# Patient Record
Sex: MALE | Race: WHITE | NOT HISPANIC OR LATINO | Employment: OTHER | URBAN - METROPOLITAN AREA
[De-identification: names, ages, dates, MRNs, and addresses within clinical notes are randomized per-mention and may not be internally consistent; named-entity substitution may affect disease eponyms.]

---

## 2017-01-04 ENCOUNTER — COMMUNICATION - HEALTHEAST (OUTPATIENT)
Dept: FAMILY MEDICINE | Facility: CLINIC | Age: 67
End: 2017-01-04

## 2017-01-04 ENCOUNTER — OFFICE VISIT - HEALTHEAST (OUTPATIENT)
Dept: FAMILY MEDICINE | Facility: CLINIC | Age: 67
End: 2017-01-04

## 2017-01-04 DIAGNOSIS — M25.511 SHOULDER PAIN, RIGHT: ICD-10-CM

## 2017-01-04 ASSESSMENT — MIFFLIN-ST. JEOR: SCORE: 1681.02

## 2017-01-12 ENCOUNTER — HOSPITAL ENCOUNTER (OUTPATIENT)
Dept: MRI IMAGING | Facility: CLINIC | Age: 67
Discharge: HOME | End: 2017-01-12
Attending: EMERGENCY MEDICINE

## 2017-01-12 DIAGNOSIS — M25.519 SHOULDER PAIN: ICD-10-CM

## 2017-01-12 DIAGNOSIS — M54.2 NECK PAIN: ICD-10-CM

## 2017-01-16 ENCOUNTER — HOSPITAL ENCOUNTER (OUTPATIENT)
Dept: PHYSICAL MEDICINE AND REHAB | Facility: CLINIC | Age: 67
Discharge: HOME | End: 2017-01-16
Attending: PHYSICAL MEDICINE & REHABILITATION

## 2017-01-16 DIAGNOSIS — M48.02 NEURAL FORAMINAL STENOSIS OF CERVICAL SPINE: ICD-10-CM

## 2017-01-16 DIAGNOSIS — M79.18 MYOFASCIAL MUSCLE PAIN: ICD-10-CM

## 2017-01-16 DIAGNOSIS — M54.2 CERVICALGIA: ICD-10-CM

## 2017-01-16 DIAGNOSIS — M54.12 CERVICAL RADICULAR PAIN: ICD-10-CM

## 2017-01-16 ASSESSMENT — MIFFLIN-ST. JEOR: SCORE: 1649.27

## 2017-01-17 ENCOUNTER — OFFICE VISIT - HEALTHEAST (OUTPATIENT)
Dept: PHYSICAL THERAPY | Facility: CLINIC | Age: 67
End: 2017-01-17

## 2017-01-17 DIAGNOSIS — M54.6 THORACIC SPINE PAIN: ICD-10-CM

## 2017-01-17 DIAGNOSIS — M79.18 MYOFASCIAL PAIN: ICD-10-CM

## 2017-01-17 DIAGNOSIS — M54.12 CERVICAL RADICULITIS: ICD-10-CM

## 2017-01-20 ENCOUNTER — OFFICE VISIT - HEALTHEAST (OUTPATIENT)
Dept: PHYSICAL THERAPY | Facility: CLINIC | Age: 67
End: 2017-01-20

## 2017-01-20 ENCOUNTER — HOSPITAL ENCOUNTER (OUTPATIENT)
Dept: PHYSICAL MEDICINE AND REHAB | Facility: CLINIC | Age: 67
Discharge: HOME | End: 2017-01-20
Attending: PHYSICAL MEDICINE & REHABILITATION

## 2017-01-20 DIAGNOSIS — M54.12 CERVICAL RADICULAR PAIN: ICD-10-CM

## 2017-01-20 DIAGNOSIS — M54.2 CERVICALGIA: ICD-10-CM

## 2017-01-20 DIAGNOSIS — M54.12 CERVICAL RADICULITIS: ICD-10-CM

## 2017-01-20 DIAGNOSIS — M48.02 NEURAL FORAMINAL STENOSIS OF CERVICAL SPINE: ICD-10-CM

## 2017-01-20 DIAGNOSIS — M54.6 THORACIC SPINE PAIN: ICD-10-CM

## 2017-01-20 DIAGNOSIS — M79.18 MYOFASCIAL PAIN: ICD-10-CM

## 2017-01-24 ENCOUNTER — OFFICE VISIT - HEALTHEAST (OUTPATIENT)
Dept: PHYSICAL THERAPY | Facility: CLINIC | Age: 67
End: 2017-01-24

## 2017-01-24 DIAGNOSIS — M54.6 THORACIC SPINE PAIN: ICD-10-CM

## 2017-01-24 DIAGNOSIS — M79.18 MYOFASCIAL PAIN: ICD-10-CM

## 2017-01-24 DIAGNOSIS — M54.12 CERVICAL RADICULITIS: ICD-10-CM

## 2017-01-27 ENCOUNTER — COMMUNICATION - HEALTHEAST (OUTPATIENT)
Dept: FAMILY MEDICINE | Facility: CLINIC | Age: 67
End: 2017-01-27

## 2017-01-27 DIAGNOSIS — R05.9 COUGH: ICD-10-CM

## 2017-01-30 ENCOUNTER — COMMUNICATION - HEALTHEAST (OUTPATIENT)
Dept: FAMILY MEDICINE | Facility: CLINIC | Age: 67
End: 2017-01-30

## 2017-04-05 ENCOUNTER — COMMUNICATION - HEALTHEAST (OUTPATIENT)
Dept: FAMILY MEDICINE | Facility: CLINIC | Age: 67
End: 2017-04-05

## 2017-04-05 DIAGNOSIS — E78.5 HYPERLIPIDEMIA: ICD-10-CM

## 2017-04-10 ENCOUNTER — COMMUNICATION - HEALTHEAST (OUTPATIENT)
Dept: FAMILY MEDICINE | Facility: CLINIC | Age: 67
End: 2017-04-10

## 2017-04-12 ENCOUNTER — AMBULATORY - HEALTHEAST (OUTPATIENT)
Dept: LAB | Facility: CLINIC | Age: 67
End: 2017-04-12

## 2017-04-12 DIAGNOSIS — E78.5 HYPERLIPIDEMIA, UNSPECIFIED HYPERLIPIDEMIA TYPE: ICD-10-CM

## 2017-04-12 LAB
CHOLEST SERPL-MCNC: 210 MG/DL
FASTING STATUS PATIENT QL REPORTED: YES
HDLC SERPL-MCNC: 34 MG/DL
LDLC SERPL CALC-MCNC: 105 MG/DL
TRIGL SERPL-MCNC: 353 MG/DL

## 2017-04-17 ENCOUNTER — COMMUNICATION - HEALTHEAST (OUTPATIENT)
Dept: PHYSICAL MEDICINE AND REHAB | Facility: CLINIC | Age: 67
End: 2017-04-17

## 2017-04-17 DIAGNOSIS — M54.12 CERVICAL RADICULAR PAIN: ICD-10-CM

## 2017-04-17 DIAGNOSIS — M54.2 CERVICALGIA: ICD-10-CM

## 2017-04-20 ENCOUNTER — COMMUNICATION - HEALTHEAST (OUTPATIENT)
Dept: PHYSICAL MEDICINE AND REHAB | Facility: CLINIC | Age: 67
End: 2017-04-20

## 2017-04-28 ENCOUNTER — HOSPITAL ENCOUNTER (OUTPATIENT)
Dept: PHYSICAL MEDICINE AND REHAB | Facility: CLINIC | Age: 67
Discharge: HOME | End: 2017-04-28
Attending: PHYSICAL MEDICINE & REHABILITATION

## 2017-04-28 DIAGNOSIS — M79.18 MYOFASCIAL MUSCLE PAIN: ICD-10-CM

## 2017-04-28 DIAGNOSIS — M48.02 NEURAL FORAMINAL STENOSIS OF CERVICAL SPINE: ICD-10-CM

## 2017-04-28 DIAGNOSIS — M54.12 CERVICAL RADICULAR PAIN: ICD-10-CM

## 2017-04-28 DIAGNOSIS — M54.2 CERVICALGIA: ICD-10-CM

## 2017-07-19 ENCOUNTER — COMMUNICATION - HEALTHEAST (OUTPATIENT)
Dept: FAMILY MEDICINE | Facility: CLINIC | Age: 67
End: 2017-07-19

## 2017-07-19 DIAGNOSIS — E78.5 HYPERLIPIDEMIA: ICD-10-CM

## 2017-07-20 ENCOUNTER — COMMUNICATION - HEALTHEAST (OUTPATIENT)
Dept: FAMILY MEDICINE | Facility: CLINIC | Age: 67
End: 2017-07-20

## 2017-07-20 DIAGNOSIS — E78.5 HYPERLIPIDEMIA: ICD-10-CM

## 2017-08-15 ENCOUNTER — COMMUNICATION - HEALTHEAST (OUTPATIENT)
Dept: PHYSICAL MEDICINE AND REHAB | Facility: CLINIC | Age: 67
End: 2017-08-15

## 2017-08-15 DIAGNOSIS — M54.2 CERVICALGIA: ICD-10-CM

## 2017-08-15 DIAGNOSIS — M54.12 CERVICAL RADICULAR PAIN: ICD-10-CM

## 2017-09-10 ENCOUNTER — COMMUNICATION - HEALTHEAST (OUTPATIENT)
Dept: PHYSICAL MEDICINE AND REHAB | Facility: CLINIC | Age: 67
End: 2017-09-10

## 2017-09-10 DIAGNOSIS — M54.2 CERVICALGIA: ICD-10-CM

## 2017-09-10 DIAGNOSIS — M54.12 CERVICAL RADICULAR PAIN: ICD-10-CM

## 2017-09-25 ENCOUNTER — AMBULATORY - HEALTHEAST (OUTPATIENT)
Dept: NURSING | Facility: CLINIC | Age: 67
End: 2017-09-25

## 2017-09-25 DIAGNOSIS — Z00.00 HEALTH CARE MAINTENANCE: ICD-10-CM

## 2017-10-28 ENCOUNTER — COMMUNICATION - HEALTHEAST (OUTPATIENT)
Dept: FAMILY MEDICINE | Facility: CLINIC | Age: 67
End: 2017-10-28

## 2017-10-28 DIAGNOSIS — E78.5 HYPERLIPIDEMIA: ICD-10-CM

## 2017-10-31 ENCOUNTER — RECORDS - HEALTHEAST (OUTPATIENT)
Dept: ADMINISTRATIVE | Facility: OTHER | Age: 67
End: 2017-10-31

## 2018-01-13 ENCOUNTER — COMMUNICATION - HEALTHEAST (OUTPATIENT)
Dept: FAMILY MEDICINE | Facility: CLINIC | Age: 68
End: 2018-01-13

## 2018-01-13 ENCOUNTER — COMMUNICATION - HEALTHEAST (OUTPATIENT)
Dept: PHYSICAL MEDICINE AND REHAB | Facility: CLINIC | Age: 68
End: 2018-01-13

## 2018-01-13 DIAGNOSIS — M54.12 CERVICAL RADICULAR PAIN: ICD-10-CM

## 2018-01-13 DIAGNOSIS — E78.5 HYPERLIPIDEMIA: ICD-10-CM

## 2018-01-13 DIAGNOSIS — M54.2 CERVICALGIA: ICD-10-CM

## 2018-01-29 ENCOUNTER — COMMUNICATION - HEALTHEAST (OUTPATIENT)
Dept: PHYSICAL MEDICINE AND REHAB | Facility: CLINIC | Age: 68
End: 2018-01-29

## 2018-02-05 ENCOUNTER — COMMUNICATION - HEALTHEAST (OUTPATIENT)
Dept: PHYSICAL MEDICINE AND REHAB | Facility: CLINIC | Age: 68
End: 2018-02-05

## 2018-04-11 ENCOUNTER — COMMUNICATION - HEALTHEAST (OUTPATIENT)
Dept: FAMILY MEDICINE | Facility: CLINIC | Age: 68
End: 2018-04-11

## 2018-04-11 DIAGNOSIS — E78.5 HYPERLIPIDEMIA: ICD-10-CM

## 2018-04-18 ENCOUNTER — HOSPITAL ENCOUNTER (OUTPATIENT)
Dept: PHYSICAL MEDICINE AND REHAB | Facility: CLINIC | Age: 68
Discharge: HOME | End: 2018-04-18
Attending: PHYSICAL MEDICINE & REHABILITATION

## 2018-04-18 ENCOUNTER — HOSPITAL ENCOUNTER (OUTPATIENT)
Dept: RADIOLOGY | Facility: HOSPITAL | Age: 68
Discharge: HOME | End: 2018-04-18
Attending: PHYSICAL MEDICINE & REHABILITATION

## 2018-04-18 DIAGNOSIS — M48.02 NEURAL FORAMINAL STENOSIS OF CERVICAL SPINE: ICD-10-CM

## 2018-04-18 DIAGNOSIS — R20.2 HAND PARESTHESIA: ICD-10-CM

## 2018-04-18 DIAGNOSIS — M54.12 CERVICAL RADICULAR PAIN: ICD-10-CM

## 2018-04-18 DIAGNOSIS — M79.18 MYOFASCIAL MUSCLE PAIN: ICD-10-CM

## 2018-04-18 DIAGNOSIS — M54.2 CERVICALGIA: ICD-10-CM

## 2018-04-18 ASSESSMENT — MIFFLIN-ST. JEOR
SCORE: 1671.95
SCORE: 1671.95

## 2018-04-20 ENCOUNTER — COMMUNICATION - HEALTHEAST (OUTPATIENT)
Dept: PHYSICAL MEDICINE AND REHAB | Facility: CLINIC | Age: 68
End: 2018-04-20

## 2018-04-20 DIAGNOSIS — M54.16 LUMBAR RADICULAR PAIN: ICD-10-CM

## 2018-04-24 ENCOUNTER — RECORDS - HEALTHEAST (OUTPATIENT)
Dept: ADMINISTRATIVE | Facility: OTHER | Age: 68
End: 2018-04-24

## 2018-04-26 ENCOUNTER — RECORDS - HEALTHEAST (OUTPATIENT)
Dept: RADIOLOGY | Facility: CLINIC | Age: 68
End: 2018-04-26

## 2018-04-26 ENCOUNTER — OFFICE VISIT - HEALTHEAST (OUTPATIENT)
Dept: NEUROSURGERY | Facility: CLINIC | Age: 68
End: 2018-04-26

## 2018-04-26 DIAGNOSIS — T81.40XS POSTOPERATIVE INFECTION, SEQUELA: ICD-10-CM

## 2018-04-26 DIAGNOSIS — G54.2 CERVICAL NERVE ROOT COMPRESSION: ICD-10-CM

## 2018-04-26 ASSESSMENT — MIFFLIN-ST. JEOR: SCORE: 1644.73

## 2018-04-27 ENCOUNTER — COMMUNICATION - HEALTHEAST (OUTPATIENT)
Dept: FAMILY MEDICINE | Facility: CLINIC | Age: 68
End: 2018-04-27

## 2018-04-30 ENCOUNTER — RECORDS - HEALTHEAST (OUTPATIENT)
Dept: ADMINISTRATIVE | Facility: OTHER | Age: 68
End: 2018-04-30

## 2018-04-30 ENCOUNTER — COMMUNICATION - HEALTHEAST (OUTPATIENT)
Dept: NEUROSURGERY | Facility: CLINIC | Age: 68
End: 2018-04-30

## 2018-05-02 ENCOUNTER — OFFICE VISIT - HEALTHEAST (OUTPATIENT)
Dept: FAMILY MEDICINE | Facility: CLINIC | Age: 68
End: 2018-05-02

## 2018-05-02 ENCOUNTER — AMBULATORY - HEALTHEAST (OUTPATIENT)
Dept: FAMILY MEDICINE | Facility: CLINIC | Age: 68
End: 2018-05-02

## 2018-05-02 DIAGNOSIS — M54.12 CERVICAL RADICULAR PAIN: ICD-10-CM

## 2018-05-02 DIAGNOSIS — Z01.818 PREOP EXAMINATION: ICD-10-CM

## 2018-05-02 LAB
BASOPHILS # BLD AUTO: 0 THOU/UL (ref 0–0.2)
BASOPHILS NFR BLD AUTO: 1 % (ref 0–2)
EOSINOPHIL # BLD AUTO: 0.2 THOU/UL (ref 0–0.4)
EOSINOPHIL NFR BLD AUTO: 3 % (ref 0–6)
ERYTHROCYTE [DISTWIDTH] IN BLOOD BY AUTOMATED COUNT: 12.4 % (ref 11–14.5)
HCT VFR BLD AUTO: 49.5 % (ref 40–54)
HGB BLD-MCNC: 17.1 G/DL (ref 14–18)
INR PPP: 1.06 (ref 0.9–1.1)
LYMPHOCYTES # BLD AUTO: 1.8 THOU/UL (ref 0.8–4.4)
LYMPHOCYTES NFR BLD AUTO: 23 % (ref 20–40)
MCH RBC QN AUTO: 32.1 PG (ref 27–34)
MCHC RBC AUTO-ENTMCNC: 34.4 G/DL (ref 32–36)
MCV RBC AUTO: 93 FL (ref 80–100)
MONOCYTES # BLD AUTO: 0.7 THOU/UL (ref 0–0.9)
MONOCYTES NFR BLD AUTO: 8 % (ref 2–10)
NEUTROPHILS # BLD AUTO: 5.1 THOU/UL (ref 2–7.7)
NEUTROPHILS NFR BLD AUTO: 65 % (ref 50–70)
PLATELET # BLD AUTO: 148 THOU/UL (ref 140–440)
PMV BLD AUTO: 8.2 FL (ref 7–10)
POTASSIUM BLD-SCNC: 4 MMOL/L (ref 3.5–5)
RBC # BLD AUTO: 5.31 MILL/UL (ref 4.4–6.2)
WBC: 7.8 THOU/UL (ref 4–11)

## 2018-05-02 ASSESSMENT — MIFFLIN-ST. JEOR: SCORE: 1688.4

## 2018-05-03 ENCOUNTER — COMMUNICATION - HEALTHEAST (OUTPATIENT)
Dept: FAMILY MEDICINE | Facility: CLINIC | Age: 68
End: 2018-05-03

## 2018-05-03 ENCOUNTER — RECORDS - HEALTHEAST (OUTPATIENT)
Dept: ADMINISTRATIVE | Facility: OTHER | Age: 68
End: 2018-05-03

## 2018-05-03 LAB
ATRIAL RATE - MUSE: 80 BPM
DIASTOLIC BLOOD PRESSURE - MUSE: NORMAL MMHG
INTERPRETATION ECG - MUSE: NORMAL
P AXIS - MUSE: 38 DEGREES
PR INTERVAL - MUSE: 192 MS
QRS DURATION - MUSE: 80 MS
QT - MUSE: 366 MS
QTC - MUSE: 422 MS
R AXIS - MUSE: 4 DEGREES
SYSTOLIC BLOOD PRESSURE - MUSE: NORMAL MMHG
T AXIS - MUSE: 9 DEGREES
VENTRICULAR RATE- MUSE: 80 BPM

## 2018-05-07 ENCOUNTER — OFFICE VISIT - HEALTHEAST (OUTPATIENT)
Dept: FAMILY MEDICINE | Facility: CLINIC | Age: 68
End: 2018-05-07

## 2018-05-07 DIAGNOSIS — J40 BRONCHITIS: ICD-10-CM

## 2018-05-07 DIAGNOSIS — R06.2 WHEEZING: ICD-10-CM

## 2018-05-07 ASSESSMENT — MIFFLIN-ST. JEOR: SCORE: 1665.26

## 2018-05-11 ENCOUNTER — OFFICE VISIT - HEALTHEAST (OUTPATIENT)
Dept: FAMILY MEDICINE | Facility: CLINIC | Age: 68
End: 2018-05-11

## 2018-05-11 DIAGNOSIS — J40 BRONCHITIS: ICD-10-CM

## 2018-05-11 DIAGNOSIS — Z00.00 HEALTH CARE MAINTENANCE: ICD-10-CM

## 2018-05-11 ASSESSMENT — MIFFLIN-ST. JEOR: SCORE: 1667.98

## 2018-05-18 ENCOUNTER — COMMUNICATION - HEALTHEAST (OUTPATIENT)
Dept: NEUROSURGERY | Facility: CLINIC | Age: 68
End: 2018-05-18

## 2018-05-18 ASSESSMENT — MIFFLIN-ST. JEOR: SCORE: 1633.96

## 2018-05-21 ENCOUNTER — RECORDS - HEALTHEAST (OUTPATIENT)
Dept: ADMINISTRATIVE | Facility: OTHER | Age: 68
End: 2018-05-21

## 2018-05-21 ENCOUNTER — ANESTHESIA - HEALTHEAST (OUTPATIENT)
Dept: SURGERY | Facility: HOSPITAL | Age: 68
End: 2018-05-21

## 2018-05-22 ENCOUNTER — SURGERY - HEALTHEAST (OUTPATIENT)
Dept: SURGERY | Facility: HOSPITAL | Age: 68
End: 2018-05-22

## 2018-05-23 ENCOUNTER — COMMUNICATION - HEALTHEAST (OUTPATIENT)
Dept: NEUROSURGERY | Facility: CLINIC | Age: 68
End: 2018-05-23

## 2018-05-31 ENCOUNTER — RECORDS - HEALTHEAST (OUTPATIENT)
Dept: ADMINISTRATIVE | Facility: OTHER | Age: 68
End: 2018-05-31

## 2018-06-06 ENCOUNTER — AMBULATORY - HEALTHEAST (OUTPATIENT)
Dept: NEUROSURGERY | Facility: CLINIC | Age: 68
End: 2018-06-06

## 2018-06-06 DIAGNOSIS — Z48.02 REMOVAL OF STAPLES: ICD-10-CM

## 2018-06-08 ENCOUNTER — COMMUNICATION - HEALTHEAST (OUTPATIENT)
Dept: NEUROSURGERY | Facility: CLINIC | Age: 68
End: 2018-06-08

## 2018-06-20 ENCOUNTER — OFFICE VISIT - HEALTHEAST (OUTPATIENT)
Dept: NEUROSURGERY | Facility: CLINIC | Age: 68
End: 2018-06-20

## 2018-06-20 DIAGNOSIS — G54.2 CERVICAL NERVE ROOT COMPRESSION: ICD-10-CM

## 2018-06-20 ASSESSMENT — MIFFLIN-ST. JEOR: SCORE: 1656.64

## 2018-07-03 ENCOUNTER — COMMUNICATION - HEALTHEAST (OUTPATIENT)
Dept: PHYSICAL THERAPY | Facility: REHABILITATION | Age: 68
End: 2018-07-03

## 2018-07-03 ENCOUNTER — COMMUNICATION - HEALTHEAST (OUTPATIENT)
Dept: NEUROSURGERY | Facility: CLINIC | Age: 68
End: 2018-07-03

## 2018-07-03 ENCOUNTER — OFFICE VISIT - HEALTHEAST (OUTPATIENT)
Dept: PHYSICAL THERAPY | Facility: REHABILITATION | Age: 68
End: 2018-07-03

## 2018-07-03 DIAGNOSIS — M54.12 CERVICAL RADICULITIS: ICD-10-CM

## 2018-07-03 DIAGNOSIS — M79.18 MYOFASCIAL PAIN: ICD-10-CM

## 2018-07-03 DIAGNOSIS — M62.81 GENERALIZED MUSCLE WEAKNESS: ICD-10-CM

## 2018-07-03 DIAGNOSIS — G54.2 CERVICAL NERVE ROOT COMPRESSION: ICD-10-CM

## 2018-07-16 ENCOUNTER — COMMUNICATION - HEALTHEAST (OUTPATIENT)
Dept: FAMILY MEDICINE | Facility: CLINIC | Age: 68
End: 2018-07-16

## 2018-07-16 DIAGNOSIS — E78.5 HYPERLIPIDEMIA: ICD-10-CM

## 2018-07-17 ENCOUNTER — OFFICE VISIT - HEALTHEAST (OUTPATIENT)
Dept: PHYSICAL THERAPY | Facility: REHABILITATION | Age: 68
End: 2018-07-17

## 2018-07-17 DIAGNOSIS — G54.2 CERVICAL NERVE ROOT COMPRESSION: ICD-10-CM

## 2018-07-17 DIAGNOSIS — M54.6 THORACIC SPINE PAIN: ICD-10-CM

## 2018-07-17 DIAGNOSIS — M79.18 MYOFASCIAL PAIN: ICD-10-CM

## 2018-07-17 DIAGNOSIS — M54.12 CERVICAL RADICULITIS: ICD-10-CM

## 2018-07-17 DIAGNOSIS — M62.81 GENERALIZED MUSCLE WEAKNESS: ICD-10-CM

## 2018-07-18 ENCOUNTER — AMBULATORY - HEALTHEAST (OUTPATIENT)
Dept: NEUROSURGERY | Facility: CLINIC | Age: 68
End: 2018-07-18

## 2018-07-18 DIAGNOSIS — G54.2 CERVICAL NERVE ROOT COMPRESSION: ICD-10-CM

## 2018-07-24 ENCOUNTER — OFFICE VISIT - HEALTHEAST (OUTPATIENT)
Dept: PHYSICAL THERAPY | Facility: REHABILITATION | Age: 68
End: 2018-07-24

## 2018-07-24 DIAGNOSIS — M79.18 MYOFASCIAL PAIN: ICD-10-CM

## 2018-07-24 DIAGNOSIS — G54.2 CERVICAL NERVE ROOT COMPRESSION: ICD-10-CM

## 2018-07-24 DIAGNOSIS — M54.6 THORACIC SPINE PAIN: ICD-10-CM

## 2018-07-24 DIAGNOSIS — M54.12 CERVICAL RADICULITIS: ICD-10-CM

## 2018-07-24 DIAGNOSIS — M62.81 GENERALIZED MUSCLE WEAKNESS: ICD-10-CM

## 2018-07-31 ENCOUNTER — OFFICE VISIT - HEALTHEAST (OUTPATIENT)
Dept: PHYSICAL THERAPY | Facility: REHABILITATION | Age: 68
End: 2018-07-31

## 2018-07-31 DIAGNOSIS — G54.2 CERVICAL NERVE ROOT COMPRESSION: ICD-10-CM

## 2018-07-31 DIAGNOSIS — M79.18 MYOFASCIAL PAIN: ICD-10-CM

## 2018-07-31 DIAGNOSIS — M62.81 GENERALIZED MUSCLE WEAKNESS: ICD-10-CM

## 2018-07-31 DIAGNOSIS — M54.6 THORACIC SPINE PAIN: ICD-10-CM

## 2018-07-31 DIAGNOSIS — M54.12 CERVICAL RADICULITIS: ICD-10-CM

## 2018-08-06 ENCOUNTER — RECORDS - HEALTHEAST (OUTPATIENT)
Dept: GENERAL RADIOLOGY | Facility: CLINIC | Age: 68
End: 2018-08-06

## 2018-08-06 ENCOUNTER — OFFICE VISIT - HEALTHEAST (OUTPATIENT)
Dept: FAMILY MEDICINE | Facility: CLINIC | Age: 68
End: 2018-08-06

## 2018-08-06 DIAGNOSIS — M25.512 SHOULDER PAIN, LEFT: ICD-10-CM

## 2018-08-06 DIAGNOSIS — S40.029A: ICD-10-CM

## 2018-08-06 DIAGNOSIS — M25.512 PAIN IN LEFT SHOULDER: ICD-10-CM

## 2018-08-06 DIAGNOSIS — S40.019A: ICD-10-CM

## 2018-08-21 ENCOUNTER — OFFICE VISIT - HEALTHEAST (OUTPATIENT)
Dept: PHYSICAL THERAPY | Facility: REHABILITATION | Age: 68
End: 2018-08-21

## 2018-08-21 DIAGNOSIS — M62.81 GENERALIZED MUSCLE WEAKNESS: ICD-10-CM

## 2018-08-21 DIAGNOSIS — M54.6 THORACIC SPINE PAIN: ICD-10-CM

## 2018-08-21 DIAGNOSIS — M54.12 CERVICAL RADICULITIS: ICD-10-CM

## 2018-08-21 DIAGNOSIS — G54.2 CERVICAL NERVE ROOT COMPRESSION: ICD-10-CM

## 2018-08-21 DIAGNOSIS — M79.18 MYOFASCIAL PAIN: ICD-10-CM

## 2018-08-28 ENCOUNTER — OFFICE VISIT - HEALTHEAST (OUTPATIENT)
Dept: PHYSICAL THERAPY | Facility: REHABILITATION | Age: 68
End: 2018-08-28

## 2018-08-28 DIAGNOSIS — M79.18 MYOFASCIAL PAIN: ICD-10-CM

## 2018-08-28 DIAGNOSIS — G54.2 CERVICAL NERVE ROOT COMPRESSION: ICD-10-CM

## 2018-08-28 DIAGNOSIS — M62.81 GENERALIZED MUSCLE WEAKNESS: ICD-10-CM

## 2018-08-28 DIAGNOSIS — M54.12 CERVICAL RADICULITIS: ICD-10-CM

## 2018-08-28 DIAGNOSIS — M54.6 THORACIC SPINE PAIN: ICD-10-CM

## 2018-09-04 ENCOUNTER — OFFICE VISIT - HEALTHEAST (OUTPATIENT)
Dept: PHYSICAL THERAPY | Facility: REHABILITATION | Age: 68
End: 2018-09-04

## 2018-09-04 DIAGNOSIS — M79.18 MYOFASCIAL PAIN: ICD-10-CM

## 2018-09-04 DIAGNOSIS — M54.6 THORACIC SPINE PAIN: ICD-10-CM

## 2018-09-04 DIAGNOSIS — M54.12 CERVICAL RADICULITIS: ICD-10-CM

## 2018-09-04 DIAGNOSIS — G54.2 CERVICAL NERVE ROOT COMPRESSION: ICD-10-CM

## 2018-09-04 DIAGNOSIS — M62.81 GENERALIZED MUSCLE WEAKNESS: ICD-10-CM

## 2018-09-11 ENCOUNTER — OFFICE VISIT - HEALTHEAST (OUTPATIENT)
Dept: PHYSICAL THERAPY | Facility: REHABILITATION | Age: 68
End: 2018-09-11

## 2018-09-11 DIAGNOSIS — M79.18 MYOFASCIAL PAIN: ICD-10-CM

## 2018-09-11 DIAGNOSIS — G54.2 CERVICAL NERVE ROOT COMPRESSION: ICD-10-CM

## 2018-09-11 DIAGNOSIS — M62.81 GENERALIZED MUSCLE WEAKNESS: ICD-10-CM

## 2018-09-11 DIAGNOSIS — M54.12 CERVICAL RADICULITIS: ICD-10-CM

## 2018-09-11 DIAGNOSIS — M54.6 THORACIC SPINE PAIN: ICD-10-CM

## 2018-09-18 ENCOUNTER — OFFICE VISIT - HEALTHEAST (OUTPATIENT)
Dept: PHYSICAL THERAPY | Facility: REHABILITATION | Age: 68
End: 2018-09-18

## 2018-09-18 DIAGNOSIS — M62.81 GENERALIZED MUSCLE WEAKNESS: ICD-10-CM

## 2018-09-18 DIAGNOSIS — M54.6 THORACIC SPINE PAIN: ICD-10-CM

## 2018-09-18 DIAGNOSIS — M79.18 MYOFASCIAL PAIN: ICD-10-CM

## 2018-09-18 DIAGNOSIS — G54.2 CERVICAL NERVE ROOT COMPRESSION: ICD-10-CM

## 2018-09-18 DIAGNOSIS — M54.12 CERVICAL RADICULITIS: ICD-10-CM

## 2018-09-25 ENCOUNTER — OFFICE VISIT - HEALTHEAST (OUTPATIENT)
Dept: PHYSICAL THERAPY | Facility: REHABILITATION | Age: 68
End: 2018-09-25

## 2018-09-25 DIAGNOSIS — M62.81 GENERALIZED MUSCLE WEAKNESS: ICD-10-CM

## 2018-09-25 DIAGNOSIS — M79.18 MYOFASCIAL PAIN: ICD-10-CM

## 2018-09-25 DIAGNOSIS — G54.2 CERVICAL NERVE ROOT COMPRESSION: ICD-10-CM

## 2018-09-25 DIAGNOSIS — M54.6 THORACIC SPINE PAIN: ICD-10-CM

## 2018-09-25 DIAGNOSIS — M54.12 CERVICAL RADICULITIS: ICD-10-CM

## 2018-10-14 ENCOUNTER — COMMUNICATION - HEALTHEAST (OUTPATIENT)
Dept: FAMILY MEDICINE | Facility: CLINIC | Age: 68
End: 2018-10-14

## 2018-10-14 DIAGNOSIS — E78.5 HYPERLIPIDEMIA: ICD-10-CM

## 2018-10-16 ENCOUNTER — OFFICE VISIT - HEALTHEAST (OUTPATIENT)
Dept: PHYSICAL THERAPY | Facility: REHABILITATION | Age: 68
End: 2018-10-16

## 2018-10-16 DIAGNOSIS — M54.6 THORACIC SPINE PAIN: ICD-10-CM

## 2018-10-16 DIAGNOSIS — M79.18 MYOFASCIAL PAIN: ICD-10-CM

## 2018-10-16 DIAGNOSIS — M62.81 GENERALIZED MUSCLE WEAKNESS: ICD-10-CM

## 2018-10-16 DIAGNOSIS — G54.2 CERVICAL NERVE ROOT COMPRESSION: ICD-10-CM

## 2018-10-16 DIAGNOSIS — M54.12 CERVICAL RADICULITIS: ICD-10-CM

## 2018-10-22 ENCOUNTER — HOSPITAL ENCOUNTER (OUTPATIENT)
Dept: PHYSICAL MEDICINE AND REHAB | Facility: CLINIC | Age: 68
Discharge: HOME | End: 2018-10-22
Attending: PHYSICAL MEDICINE & REHABILITATION

## 2018-10-22 DIAGNOSIS — M79.18 MYOFASCIAL MUSCLE PAIN: ICD-10-CM

## 2018-10-22 DIAGNOSIS — M54.12 CERVICAL RADICULAR PAIN: ICD-10-CM

## 2018-10-22 DIAGNOSIS — M99.81 OTHER BIOMECHANICAL LESIONS OF CERVICAL REGION: ICD-10-CM

## 2018-10-22 DIAGNOSIS — M48.02 NEURAL FORAMINAL STENOSIS OF CERVICAL SPINE: ICD-10-CM

## 2018-10-22 RX ORDER — IBUPROFEN 200 MG
600 TABLET ORAL 3 TIMES DAILY
Status: SHIPPED | COMMUNITY
Start: 2018-10-22 | End: 2022-12-09

## 2018-10-22 ASSESSMENT — MIFFLIN-ST. JEOR: SCORE: 1679.32

## 2018-11-01 ENCOUNTER — COMMUNICATION - HEALTHEAST (OUTPATIENT)
Dept: PHYSICAL MEDICINE AND REHAB | Facility: CLINIC | Age: 68
End: 2018-11-01

## 2018-11-01 ENCOUNTER — AMBULATORY - HEALTHEAST (OUTPATIENT)
Dept: PHYSICAL MEDICINE AND REHAB | Facility: CLINIC | Age: 68
End: 2018-11-01

## 2018-11-01 DIAGNOSIS — M54.12 CERVICAL RADICULAR PAIN: ICD-10-CM

## 2018-11-06 ENCOUNTER — HOSPITAL ENCOUNTER (OUTPATIENT)
Dept: PHYSICAL MEDICINE AND REHAB | Facility: CLINIC | Age: 68
Discharge: HOME | End: 2018-11-06
Attending: PHYSICAL MEDICINE & REHABILITATION

## 2018-11-06 DIAGNOSIS — M54.12 CERVICAL RADICULAR PAIN: ICD-10-CM

## 2018-11-21 ENCOUNTER — AMBULATORY - HEALTHEAST (OUTPATIENT)
Dept: NURSING | Facility: CLINIC | Age: 68
End: 2018-11-21

## 2018-11-21 DIAGNOSIS — Z23 FLU VACCINE NEED: ICD-10-CM

## 2018-11-28 ENCOUNTER — HOSPITAL ENCOUNTER (OUTPATIENT)
Dept: PHYSICAL MEDICINE AND REHAB | Facility: CLINIC | Age: 68
Discharge: HOME | End: 2018-11-28
Attending: PHYSICAL MEDICINE & REHABILITATION

## 2018-11-28 DIAGNOSIS — M79.18 MYOFASCIAL MUSCLE PAIN: ICD-10-CM

## 2018-11-28 DIAGNOSIS — M48.02 NEURAL FORAMINAL STENOSIS OF CERVICAL SPINE: ICD-10-CM

## 2018-11-28 DIAGNOSIS — M54.12 CERVICAL RADICULAR PAIN: ICD-10-CM

## 2018-11-28 ASSESSMENT — MIFFLIN-ST. JEOR: SCORE: 1679.32

## 2018-12-19 ENCOUNTER — HOSPITAL ENCOUNTER (OUTPATIENT)
Dept: PHYSICAL MEDICINE AND REHAB | Facility: CLINIC | Age: 68
Discharge: HOME | End: 2018-12-19
Attending: PHYSICAL MEDICINE & REHABILITATION

## 2018-12-19 DIAGNOSIS — M48.02 NEURAL FORAMINAL STENOSIS OF CERVICAL SPINE: ICD-10-CM

## 2018-12-19 DIAGNOSIS — R25.2 HAND CRAMP: ICD-10-CM

## 2018-12-19 DIAGNOSIS — M54.12 CERVICAL RADICULAR PAIN: ICD-10-CM

## 2018-12-19 ASSESSMENT — MIFFLIN-ST. JEOR: SCORE: 1670.25

## 2019-04-02 ENCOUNTER — COMMUNICATION - HEALTHEAST (OUTPATIENT)
Dept: FAMILY MEDICINE | Facility: CLINIC | Age: 69
End: 2019-04-02

## 2019-04-04 ENCOUNTER — OFFICE VISIT - HEALTHEAST (OUTPATIENT)
Dept: FAMILY MEDICINE | Facility: CLINIC | Age: 69
End: 2019-04-04

## 2019-04-04 DIAGNOSIS — J98.01 BRONCHOSPASM: ICD-10-CM

## 2019-04-04 DIAGNOSIS — J40 BRONCHITIS: ICD-10-CM

## 2019-07-10 ENCOUNTER — COMMUNICATION - HEALTHEAST (OUTPATIENT)
Dept: FAMILY MEDICINE | Facility: CLINIC | Age: 69
End: 2019-07-10

## 2019-07-10 DIAGNOSIS — E78.5 HYPERLIPIDEMIA: ICD-10-CM

## 2019-07-16 ENCOUNTER — OFFICE VISIT - HEALTHEAST (OUTPATIENT)
Dept: FAMILY MEDICINE | Facility: CLINIC | Age: 69
End: 2019-07-16

## 2019-07-16 DIAGNOSIS — J40 BRONCHITIS: ICD-10-CM

## 2019-07-30 ENCOUNTER — OFFICE VISIT - HEALTHEAST (OUTPATIENT)
Dept: FAMILY MEDICINE | Facility: CLINIC | Age: 69
End: 2019-07-30

## 2019-07-30 ENCOUNTER — AMBULATORY - HEALTHEAST (OUTPATIENT)
Dept: FAMILY MEDICINE | Facility: CLINIC | Age: 69
End: 2019-07-30

## 2019-07-30 DIAGNOSIS — R05.9 COUGH: ICD-10-CM

## 2019-07-30 DIAGNOSIS — J40 BRONCHITIS: ICD-10-CM

## 2019-09-29 ENCOUNTER — RECORDS - HEALTHEAST (OUTPATIENT)
Dept: ADMINISTRATIVE | Facility: OTHER | Age: 69
End: 2019-09-29

## 2019-10-07 ENCOUNTER — COMMUNICATION - HEALTHEAST (OUTPATIENT)
Dept: SCHEDULING | Facility: CLINIC | Age: 69
End: 2019-10-07

## 2019-10-09 ENCOUNTER — OFFICE VISIT - HEALTHEAST (OUTPATIENT)
Dept: FAMILY MEDICINE | Facility: CLINIC | Age: 69
End: 2019-10-09

## 2019-10-09 DIAGNOSIS — Z23 ENCOUNTER FOR IMMUNIZATION: ICD-10-CM

## 2019-10-09 DIAGNOSIS — H81.13 BENIGN PAROXYSMAL POSITIONAL VERTIGO DUE TO BILATERAL VESTIBULAR DISORDER: ICD-10-CM

## 2019-10-15 ENCOUNTER — RECORDS - HEALTHEAST (OUTPATIENT)
Dept: ADMINISTRATIVE | Facility: OTHER | Age: 69
End: 2019-10-15

## 2019-12-17 ENCOUNTER — COMMUNICATION - HEALTHEAST (OUTPATIENT)
Dept: PHYSICAL MEDICINE AND REHAB | Facility: CLINIC | Age: 69
End: 2019-12-17

## 2019-12-17 DIAGNOSIS — M48.02 NEURAL FORAMINAL STENOSIS OF CERVICAL SPINE: ICD-10-CM

## 2019-12-17 DIAGNOSIS — M54.12 CERVICAL RADICULAR PAIN: ICD-10-CM

## 2020-03-29 ENCOUNTER — COMMUNICATION - HEALTHEAST (OUTPATIENT)
Dept: FAMILY MEDICINE | Facility: CLINIC | Age: 70
End: 2020-03-29

## 2020-03-29 ENCOUNTER — COMMUNICATION - HEALTHEAST (OUTPATIENT)
Dept: PHYSICAL MEDICINE AND REHAB | Facility: CLINIC | Age: 70
End: 2020-03-29

## 2020-03-29 DIAGNOSIS — M48.02 NEURAL FORAMINAL STENOSIS OF CERVICAL SPINE: ICD-10-CM

## 2020-03-29 DIAGNOSIS — M54.12 CERVICAL RADICULAR PAIN: ICD-10-CM

## 2020-03-29 DIAGNOSIS — E78.5 HYPERLIPIDEMIA: ICD-10-CM

## 2020-04-06 ENCOUNTER — COMMUNICATION - HEALTHEAST (OUTPATIENT)
Dept: PHYSICAL MEDICINE AND REHAB | Facility: CLINIC | Age: 70
End: 2020-04-06

## 2020-04-06 DIAGNOSIS — M54.12 CERVICAL RADICULAR PAIN: ICD-10-CM

## 2020-04-06 DIAGNOSIS — M48.02 NEURAL FORAMINAL STENOSIS OF CERVICAL SPINE: ICD-10-CM

## 2020-07-04 ENCOUNTER — RECORDS - HEALTHEAST (OUTPATIENT)
Dept: ADMINISTRATIVE | Facility: OTHER | Age: 70
End: 2020-07-04

## 2020-07-13 ENCOUNTER — COMMUNICATION - HEALTHEAST (OUTPATIENT)
Dept: FAMILY MEDICINE | Facility: CLINIC | Age: 70
End: 2020-07-13

## 2020-07-20 ENCOUNTER — COMMUNICATION - HEALTHEAST (OUTPATIENT)
Dept: FAMILY MEDICINE | Facility: CLINIC | Age: 70
End: 2020-07-20

## 2020-07-20 ENCOUNTER — OFFICE VISIT - HEALTHEAST (OUTPATIENT)
Dept: FAMILY MEDICINE | Facility: CLINIC | Age: 70
End: 2020-07-20

## 2020-07-20 DIAGNOSIS — R42 DIZZINESS: ICD-10-CM

## 2020-07-20 DIAGNOSIS — H93.12 TINNITUS, LEFT: ICD-10-CM

## 2020-08-01 ENCOUNTER — RECORDS - HEALTHEAST (OUTPATIENT)
Dept: ADMINISTRATIVE | Facility: OTHER | Age: 70
End: 2020-08-01

## 2020-08-04 ENCOUNTER — AMBULATORY - HEALTHEAST (OUTPATIENT)
Dept: ADMINISTRATIVE | Facility: REHABILITATION | Age: 70
End: 2020-08-04

## 2020-08-04 DIAGNOSIS — H90.3 SENSORINEURAL HEARING LOSS, BILATERAL: ICD-10-CM

## 2020-08-04 DIAGNOSIS — R42 DIZZINESS AND GIDDINESS: ICD-10-CM

## 2020-08-04 DIAGNOSIS — H93.12 TINNITUS, LEFT EAR: ICD-10-CM

## 2020-08-11 ENCOUNTER — OFFICE VISIT - HEALTHEAST (OUTPATIENT)
Dept: OCCUPATIONAL THERAPY | Facility: REHABILITATION | Age: 70
End: 2020-08-11

## 2020-08-11 DIAGNOSIS — R26.81 UNSTEADINESS ON FEET: ICD-10-CM

## 2020-08-11 DIAGNOSIS — R42 DIZZINESS: ICD-10-CM

## 2020-08-11 DIAGNOSIS — Z78.9 DECREASED ACTIVITIES OF DAILY LIVING (ADL): ICD-10-CM

## 2020-08-27 ENCOUNTER — OFFICE VISIT - HEALTHEAST (OUTPATIENT)
Dept: OCCUPATIONAL THERAPY | Facility: REHABILITATION | Age: 70
End: 2020-08-27

## 2020-08-27 DIAGNOSIS — R26.81 UNSTEADINESS ON FEET: ICD-10-CM

## 2020-08-27 DIAGNOSIS — R42 DIZZINESS: ICD-10-CM

## 2020-08-27 DIAGNOSIS — Z78.9 DECREASED ACTIVITIES OF DAILY LIVING (ADL): ICD-10-CM

## 2020-09-01 ENCOUNTER — OFFICE VISIT - HEALTHEAST (OUTPATIENT)
Dept: OCCUPATIONAL THERAPY | Facility: REHABILITATION | Age: 70
End: 2020-09-01

## 2020-09-01 DIAGNOSIS — R26.81 UNSTEADINESS ON FEET: ICD-10-CM

## 2020-09-01 DIAGNOSIS — Z78.9 DECREASED ACTIVITIES OF DAILY LIVING (ADL): ICD-10-CM

## 2020-09-01 DIAGNOSIS — R42 DIZZINESS: ICD-10-CM

## 2020-09-23 ENCOUNTER — OFFICE VISIT - HEALTHEAST (OUTPATIENT)
Dept: FAMILY MEDICINE | Facility: CLINIC | Age: 70
End: 2020-09-23

## 2020-09-23 DIAGNOSIS — M19.90 INFLAMMATORY ARTHRITIS: ICD-10-CM

## 2020-09-23 DIAGNOSIS — Z23 NEED FOR IMMUNIZATION AGAINST INFLUENZA: ICD-10-CM

## 2020-09-23 ASSESSMENT — MIFFLIN-ST. JEOR: SCORE: 1675.61

## 2020-10-01 ENCOUNTER — OFFICE VISIT - HEALTHEAST (OUTPATIENT)
Dept: OCCUPATIONAL THERAPY | Facility: REHABILITATION | Age: 70
End: 2020-10-01

## 2020-10-01 DIAGNOSIS — R26.81 UNSTEADINESS ON FEET: ICD-10-CM

## 2020-10-01 DIAGNOSIS — Z78.9 DECREASED ACTIVITIES OF DAILY LIVING (ADL): ICD-10-CM

## 2020-10-01 DIAGNOSIS — R42 DIZZINESS: ICD-10-CM

## 2020-10-26 ENCOUNTER — COMMUNICATION - HEALTHEAST (OUTPATIENT)
Dept: FAMILY MEDICINE | Facility: CLINIC | Age: 70
End: 2020-10-26

## 2020-10-26 DIAGNOSIS — M48.02 NEURAL FORAMINAL STENOSIS OF CERVICAL SPINE: ICD-10-CM

## 2020-10-26 DIAGNOSIS — M54.12 CERVICAL RADICULAR PAIN: ICD-10-CM

## 2020-10-26 RX ORDER — GABAPENTIN 300 MG/1
300 CAPSULE ORAL DAILY
Qty: 90 CAPSULE | Refills: 3 | Status: SHIPPED | OUTPATIENT
Start: 2020-10-26 | End: 2021-08-24 | Stop reason: DRUGHIGH

## 2020-11-12 ENCOUNTER — OFFICE VISIT - HEALTHEAST (OUTPATIENT)
Dept: FAMILY MEDICINE | Facility: CLINIC | Age: 70
End: 2020-11-12

## 2020-11-12 DIAGNOSIS — J40 BRONCHITIS: ICD-10-CM

## 2020-11-12 DIAGNOSIS — Z20.822 ENCOUNTER FOR LABORATORY TESTING FOR COVID-19 VIRUS: ICD-10-CM

## 2020-11-12 RX ORDER — ALBUTEROL SULFATE 90 UG/1
2 AEROSOL, METERED RESPIRATORY (INHALATION) EVERY 6 HOURS PRN
Qty: 1 EACH | Refills: 0 | Status: SHIPPED | OUTPATIENT
Start: 2020-11-12 | End: 2021-08-18

## 2020-11-14 ENCOUNTER — AMBULATORY - HEALTHEAST (OUTPATIENT)
Dept: FAMILY MEDICINE | Facility: CLINIC | Age: 70
End: 2020-11-14

## 2020-11-14 DIAGNOSIS — J40 BRONCHITIS: ICD-10-CM

## 2020-11-16 ENCOUNTER — COMMUNICATION - HEALTHEAST (OUTPATIENT)
Dept: SCHEDULING | Facility: CLINIC | Age: 70
End: 2020-11-16

## 2020-12-25 ENCOUNTER — COMMUNICATION - HEALTHEAST (OUTPATIENT)
Dept: FAMILY MEDICINE | Facility: CLINIC | Age: 70
End: 2020-12-25

## 2020-12-25 DIAGNOSIS — E78.5 HYPERLIPIDEMIA: ICD-10-CM

## 2020-12-28 RX ORDER — PRAVASTATIN SODIUM 10 MG
TABLET ORAL
Qty: 45 TABLET | Refills: 2 | Status: SHIPPED | OUTPATIENT
Start: 2020-12-28 | End: 2021-09-27 | Stop reason: SINTOL

## 2021-05-25 ENCOUNTER — RECORDS - HEALTHEAST (OUTPATIENT)
Dept: ADMINISTRATIVE | Facility: CLINIC | Age: 71
End: 2021-05-25

## 2021-05-27 ENCOUNTER — RECORDS - HEALTHEAST (OUTPATIENT)
Dept: ADMINISTRATIVE | Facility: CLINIC | Age: 71
End: 2021-05-27

## 2021-05-27 NOTE — TELEPHONE ENCOUNTER
Patient Returning Call  Reason for call:  Wife called in to check on the message to see if she can get her  in.  Information relayed to patient:  Told patient we can schedule them on 04/04/19 at 10 am.  Patient has additional questions:  No  If YES, what are your questions/concerns:  n/a  Okay to leave a detailed message?: No call back needed

## 2021-05-27 NOTE — TELEPHONE ENCOUNTER
New Appointment Needed  What is the reason for the visit:    Same Date/Next Day Appt Request  What is the reason for your visit?: Cough/sick for awhile.     Provider Preference: PCP only  How soon do you need to be seen?: Patient is on the road back to [place] and is requesting to be seen 4/4  Waitlist offered?: No  Okay to leave a detailed message:  Yes

## 2021-05-27 NOTE — PROGRESS NOTES
Assessment:     1. Bronchitis  predniSONE (DELTASONE) 10 mg tablet    benzonatate (TESSALON PERLES) 100 MG capsule   2. Bronchospasm  predniSONE (DELTASONE) 10 mg tablet    benzonatate (TESSALON PERLES) 100 MG capsule       Plan:     1. Bronchitis  Patient is not ill afebrile wheezing with expiratory wheezes patient will discontinue his Indian Symbicort take the following tablets and hydrate himself  - predniSONE (DELTASONE) 10 mg tablet; Take 10 mg by mouth daily 4 tab daily x 2 days; 3 tab daily x 2 days; 2 tab daily x 2 days;one tab daily x 2 days.  Dispense: 20 tablet; Refill: 0  - benzonatate (TESSALON PERLES) 100 MG capsule; Take 1 capsule (100 mg total) by mouth every 6 (six) hours as needed for cough.  Dispense: 30 capsule; Refill: 0    2. Bronchospasm  Treatment as below  - predniSONE (DELTASONE) 10 mg tablet; Take 10 mg by mouth daily 4 tab daily x 2 days; 3 tab daily x 2 days; 2 tab daily x 2 days;one tab daily x 2 days.  Dispense: 20 tablet; Refill: 0  - benzonatate (TESSALON PERLES) 100 MG capsule; Take 1 capsule (100 mg total) by mouth every 6 (six) hours as needed for cough.  Dispense: 30 capsule; Refill: 0      Subjective:   Patient had a 5-day history of wheezing has had an upper respiratory infection previous to this time.  Has had some laryngitis he obtained some Mexican Symbicort and began taking it.  It is a turbo inhaler.  Patient is not acutely ill.  Exam confirms expiratory wheezing with bronchospasm and a cough.  Medical decision making is to hydrate patient institute therapy with prednisone and give him some antitussive relief with Tessalon Perles.  If he develops a fever or fails to respond and this does not break the cough we cycle he will let us know.  All medical questions asked and answered I personally reviewed family surgeries and past medical history..20 minute visit system review otherwise unremarkable    Review of Systems: A complete 14 point review of systems was obtained and is  negative or as stated in the history of present illness.    Past Medical History:   Diagnosis Date     Arthritis      Cancer (H)     melanoma excised from right cheek     Cervical nerve root compression      Diverticulitis      GERD (gastroesophageal reflux disease)      Hyperlipidemia      Family History   Problem Relation Age of Onset     Dementia Father      Colon cancer Sister      Anesthesia problems Neg Hx      Past Surgical History:   Procedure Laterality Date     APPENDECTOMY       BACK SURGERY       CERVICAL LAMINECTOMY N/A 2018    Procedure: RIGHT CERVICAL 6-7, CERVICAL 7- THORACIC 1, THORACIC 1-2 HEMILAMINOTOMY MEDIAL FACETECTOMY ANF FORAMINOTOMY;  Surgeon: Brittany Victor MD;  Location: [facility] Main OR;  Service:      CERVICAL SPINE SURGERY       COLON SURGERY      sigmoid resection for diverticulitis     fistula reemoval intestines       FRACTURE SURGERY       JOINT REPLACEMENT Right     right TKA     LUMBAR SPINE SURGERY      X2     PICC  2015          CT REMOVAL PREPATELLA BURSA Left     Description: Excision Of Prepatellar Bursa;  Recorded: 2014;     rectal fistula repair      multiple     RECTAL PROLAPSE REPAIR N/A 2015    Procedure: SIGMOID RESECTION;  Surgeon: Juan Ramon Carrion MD;  Location: [facility] OR;  Service:      Social History     Tobacco Use     Smoking status: Former Smoker     Last attempt to quit: 1994     Years since quittin.2     Smokeless tobacco: Never Used   Substance Use Topics     Alcohol use: Yes     Comment: occasionally      Drug use: No         Objective:   /74   Pulse 84   Temp 98.3  F (36.8  C) (Oral)   Wt 199 lb 11.2 oz (90.6 kg)   BMI 27.85 kg/m      General Appearance:  Alert, cooperative, no distress  Head:  Normocephalic, no obvious abnormality  Ears: TM anatomy normal  Eyes:  PERRL, EOM's intact, conjunctiva and corneas clear  Nose:  Nares symmetrical, septum midline, mucosa pink, no sinus tenderness  Throat:   Lips, tongue, and mucosa are moist, pink, and intact  Neck:  Supple, symmetrical, trachea midline, no adenopathy; thyroid: no enlargement, symmetric,no tenderness/mass/nodules; no carotid bruit, no JVD  Back:  Symmetrical, no curvature, ROM normal, no CVA tenderness  Chest/Breast:  No mass or tenderness  Lungs: Chest has bilateral expiratory wheezes throughout no rales are heard  Heart:  Normal PMI, regular rate & rhythm, S1 and S2 normal, no murmurs, rubs, or gallops  Abdomen:  Soft, non-tender, bowel sounds active all four quadrants, no mass, or organomegaly  Musculoskeletal:  Tone and strength strong and symmetrical, all extremities  Lymphatic:  No adenopathy  Skin/Hair/Nails:  Skin warm, dry, and intact, no rashes  Neurologic:  Alert and oriented x3, no cranial nerve deficits, normal strength and tone, gait steady  Extremities:  No edema.  Edwin's sign negative.    Genitourinary: deferred  Pulses:  Equal bilaterally     I have had an Advance Directives discussion with the patient.      This note has been dictated using voice recognition software. Any grammatical or context distortions are unintentional and inherent to the the software.

## 2021-05-28 ASSESSMENT — ASTHMA QUESTIONNAIRES: ACT_TOTALSCORE: 25

## 2021-05-30 VITALS — HEIGHT: 70 IN | WEIGHT: 203 LBS | BODY MASS INDEX: 29.06 KG/M2

## 2021-05-30 VITALS — WEIGHT: 196 LBS | BODY MASS INDEX: 27.92 KG/M2

## 2021-05-30 VITALS — WEIGHT: 196 LBS | HEIGHT: 70 IN | BODY MASS INDEX: 28.06 KG/M2

## 2021-05-30 NOTE — PROGRESS NOTES
Assessment:     1. Bronchitis  azithromycin (ZITHROMAX) 250 MG tablet    methylPREDNISolone (MEDROL, BARRINGTON,) 4 mg tablet    benzonatate (TESSALON PERLES) 100 MG capsule       Plan:     1. Bronchitis  Patient always seems to get a severe bronchitis in the early spring and in the late summer and this is no exception.  Based on his clinical exam and his past medical history he usually responds to the following regime therefore the following 3 medications will be prescribed; he is to hydrate himself if he develops febrile illness or fails to respond he will return  - azithromycin (ZITHROMAX) 250 MG tablet; Take 2 tablets on day 1, then 1 tablet for 4 days.  Dispense: 6 tablet; Refill: 0  - methylPREDNISolone (MEDROL, BARRINGTON,) 4 mg tablet; follow package directions  Dispense: 21 tablet; Refill: 0  - benzonatate (TESSALON PERLES) 100 MG capsule; Take 1 capsule (100 mg total) by mouth every 6 (six) hours as needed for cough.  Dispense: 30 capsule; Refill: 0      Subjective:   Zeeshan comes in with a 4-day history and a prodrome of a slight sore throat or chest heaviness cough and wheezing.  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  He does have slight rhinorrhea he has no anterior chest pain no coronary history.  This is not atypical chest discomfort this is rather classical for him as he gets infections in his chest usually twice a year beginning of spring and late summer.  Because unknown could be allergic.  Patient méndez in [place].  Generally during excessive heat times and wind and rain he develops a syndrome.  Based on findings and clinical history I have elected to treat him with the above 3 medications.  He will call me if he feels to respond.  All medical questions asked and answered.  We will see him for exam prior to him leaving for [place] in [place] time    Review of Systems: A complete 14 point review of systems was obtained and is  negative or as stated in the history of present illness.    Past Medical History:   Diagnosis Date     Arthritis      Cancer (H)     melanoma excised from right cheek     Cervical nerve root compression      Diverticulitis      GERD (gastroesophageal reflux disease)      Hyperlipidemia      Family History   Problem Relation Age of Onset     Dementia Father      Colon cancer Sister      Anesthesia problems Neg Hx      Past Surgical History:   Procedure Laterality Date     APPENDECTOMY       BACK SURGERY       CERVICAL LAMINECTOMY N/A 2018    Procedure: RIGHT CERVICAL 6-7, CERVICAL 7- THORACIC 1, THORACIC 1-2 HEMILAMINOTOMY MEDIAL FACETECTOMY ANF FORAMINOTOMY;  Surgeon: Brittany Victor MD;  Location: [facility] Main OR;  Service:      CERVICAL SPINE SURGERY       COLON SURGERY      sigmoid resection for diverticulitis     fistula reemoval intestines       FRACTURE SURGERY       JOINT REPLACEMENT Right     right TKA     LUMBAR SPINE SURGERY      X2     PICC  2015          SC REMOVAL PREPATELLA BURSA Left     Description: Excision Of Prepatellar Bursa;  Recorded: 2014;     rectal fistula repair      multiple     RECTAL PROLAPSE REPAIR N/A 2015    Procedure: SIGMOID RESECTION;  Surgeon: Juan Ramon Carrion MD;  Location: [facility] OR;  Service:      Social History     Tobacco Use     Smoking status: Former Smoker     Last attempt to quit: 1994     Years since quittin.5     Smokeless tobacco: Never Used   Substance Use Topics     Alcohol use: Yes     Comment: occasionally      Drug use: No         Objective:   /68 (Patient Site: Left Arm, Patient Position: Sitting, Cuff Size: Adult Large)   Pulse 97   Wt 200 lb 11.2 oz (91 kg)   SpO2 98%   BMI 27.99 kg/m      General Appearance:  Normal  Head:  Normal  Ears: Normal  Eyes:  Normal  Nose:  Normal  Throat:  Normal  Neck:  Normal  Back:  Normal  Chest/Breast:Normal  Lungs: Rhonchi left lower lobe; expiratory wheeze both lower  lobes rhonchi clear posttussive really patient is afebrile  Heart:  Normal  Abdomen:  Normal  Musculoskeletal:  Normal  Lymphatic:  Normal  Skin/Hair/Nails:  Normal  Neurologic:  Normal  Extremities:  Normal  Genitourinary: Normal  Pulses:  Normal           This note has been dictated using voice recognition software. Any grammatical or context distortions are unintentional and inherent to the the software.

## 2021-05-30 NOTE — PROGRESS NOTES
Patient ID: Nando Carreno is a 69 y.o. male.  /66   Pulse 99   Temp 98  F (36.7  C)   Wt 197 lb 14.4 oz (89.8 kg)   SpO2 91%   BMI 27.60 kg/m      Assessment/Plan:                Diagnoses and all orders for this visit:    Bronchitis  -     predniSONE (DELTASONE) 10 mg tablet; 2 tablet daily for 5 days, then 1 tablet daily for 5 days.  Dispense: 15 tablet; Refill: 0  -     doxycycline (MONODOX) 100 MG capsule; Take 1 capsule (100 mg total) by mouth 2 (two) times a day for 10 days.  Dispense: 20 capsule; Refill: 0  -     albuterol (PROAIR HFA;PROVENTIL HFA;VENTOLIN HFA) 90 mcg/actuation inhaler; Inhale 2 puffs every 6 (six) hours as needed for wheezing.  Dispense: 1 each; Refill: 0    Cough  -     XR Chest 2 Views      DISCUSSION  We will treat with prednisone doxycycline and albuterol.  If symptoms do not improve will need further follow-up.  Chest x-ray reviewed I do not see a distinct infiltrate will send to radiology for review as well.  Subjective:     HPI    Nando Carreno is a 69 y.o. male he is here today to discuss recurrence of respiratory infection symptoms including cough productive of mucus, significant nasal congestion.  He was seen July 16, 2019 and prescribed medication including steroid and antibiotic and cough suppressant.  Patient reports that symptoms improved for a few days but have since worsened back to their baseline.  He reports some new discomfort in the throat deep inside but otherwise very similar.  Denies significant shortness of breath no fevers or chills.  He reports a history of recurrent similar symptoms that typically occur in the spring or fall of the year.  He is typically been treated in the same way.  He does report in the past he had been prescribed a Symbicort inhaler which seemed to in his opinion worked to alleviate symptoms more quickly.  He does not have any diagnosis of asthma.  He does not report distinct wheezing symptoms.  He reports that he is  completely normal in between these bouts which are usually treated fairly quickly.  This is somewhat of an unusual timeframe for him to have such symptoms.  He does not have any known wheeze.    Review of Systems  Complete review of systems is obtained.  Other than the specific considerations noted above complete review of systems is negative.        Objective:   Medications:  Current Outpatient Medications   Medication Sig     acetaminophen (TYLENOL EXTRA STRENGTH) 500 MG tablet Take 1 tablet (500 mg total) by mouth every 6 (six) hours as needed for pain.     amoxicillin (AMOXIL) 500 MG capsule Take 2,000 mg by mouth as needed (dental exams).     b complex vitamins tablet Take 1 tablet by mouth daily.     diclofenac sodium (VOLTAREN) 1 % Gel Apply four times daily     gabapentin (NEURONTIN) 100 MG capsule Take 100 mg by mouth 2 (two) times a day TAKE ONE CAPSULE AT BED TIME. .     glucosamine-chondroitin 500-400 mg cap Take 2 capsules by mouth daily.      ibuprofen (ADVIL,MOTRIN) 200 MG tablet Take 600 mg by mouth 3 (three) times a day.     omeprazole (PRILOSEC) 20 MG capsule Take 20 mg by mouth daily as needed.      pravastatin (PRAVACHOL) 10 MG tablet TAKE 1 TABLET BY MOUTH EVERY OTHER DAY AT BEDTIME     albuterol (PROAIR HFA;PROVENTIL HFA;VENTOLIN HFA) 90 mcg/actuation inhaler Inhale 2 puffs every 6 (six) hours as needed for wheezing.     doxycycline (MONODOX) 100 MG capsule Take 1 capsule (100 mg total) by mouth 2 (two) times a day for 10 days.     predniSONE (DELTASONE) 10 mg tablet 2 tablet daily for 5 days, then 1 tablet daily for 5 days.     Allergies:  Allergies   Allergen Reactions     Statins-Hmg-Coa Reductase Inhibitors Myalgia     Niacin Rash     Rash all over with high dose of niacin       Tobacco:   reports that he quit smoking about 25 years ago. He has never used smokeless tobacco.     Physical Exam      /66   Pulse 99   Temp 98  F (36.7  C)   Wt 197 lb 14.4 oz (89.8 kg)   SpO2 91%   BMI  27.60 kg/m          General Appearance:    Alert, cooperative, no distress   Eyes:   No scleral icterus or conjunctival irritation       Ears:    Normal TM's and external ear canals, both ears   Throat:   Lips, mucosa, and tongue normal; teeth and gums normal   Neck:   Supple, symmetrical, trachea midline, no adenopathy;        thyroid:  No enlargement/tenderness/nodules   Lungs:     Clear to auscultation bilaterally, respirations unlabored, no wheezes or crackles   Heart:    Regular rate and rhythm,  No murmur   Extremities:  No edema, no joint swelling or redness, no evidence of any injuries   Skin:  No concerning skin findings, no suspicious moles, no rashes   Neurologic:  On gross examination there is no motor or sensory deficit.  Patient walks with a normal gait

## 2021-05-30 NOTE — TELEPHONE ENCOUNTER
Refill Approved    Rx renewed per Medication Renewal Policy. Medication was last renewed on 10/15/18.    Mae Mcgrath, [facility] Triage/Med Refill 7/10/2019     Requested Prescriptions   Pending Prescriptions Disp Refills     pravastatin (PRAVACHOL) 10 MG tablet [Pharmacy Med Name: PRAVASTATIN 10MG TABLETS] 45 tablet 0     Sig: TAKE 1 TABLET BY MOUTH EVERY OTHER DAY AT BEDTIME       Statins Refill Protocol (Hmg CoA Reductase Inhibitors) Passed - 7/10/2019  1:32 PM        Passed - PCP or prescribing provider visit in past 12 months      Last office visit with prescriber/PCP: 4/4/2019 Misael Costa MD OR same dept: 4/4/2019 Misael Costa MD OR same specialty: 4/4/2019 Misael Costa MD  Last physical: 5/11/2018 Last MTM visit: Visit date not found   Next visit within 3 mo: Visit date not found  Next physical within 3 mo: Visit date not found  Prescriber OR PCP: Misael Costa MD  Last diagnosis associated with med order: 1. Hyperlipidemia  - pravastatin (PRAVACHOL) 10 MG tablet [Pharmacy Med Name: PRAVASTATIN 10MG TABLETS]; TAKE 1 TABLET BY MOUTH EVERY OTHER DAY AT BEDTIME  Dispense: 45 tablet; Refill: 0    If protocol passes may refill for 12 months if within 3 months of last provider visit (or a total of 15 months).

## 2021-06-01 VITALS — BODY MASS INDEX: 27.56 KG/M2 | HEIGHT: 71 IN | WEIGHT: 196.9 LBS

## 2021-06-01 VITALS — HEIGHT: 71 IN | BODY MASS INDEX: 28.28 KG/M2 | WEIGHT: 202 LBS

## 2021-06-01 VITALS — BODY MASS INDEX: 27.3 KG/M2 | WEIGHT: 195 LBS | HEIGHT: 71 IN

## 2021-06-01 VITALS
HEIGHT: 70 IN | BODY MASS INDEX: 28.77 KG/M2 | WEIGHT: 201 LBS | WEIGHT: 201 LBS | HEIGHT: 70 IN | BODY MASS INDEX: 28.77 KG/M2

## 2021-06-01 VITALS — WEIGHT: 198.4 LBS | BODY MASS INDEX: 27.67 KG/M2

## 2021-06-01 VITALS — HEIGHT: 71 IN | WEIGHT: 197.5 LBS | BODY MASS INDEX: 27.65 KG/M2

## 2021-06-01 VITALS — HEIGHT: 70 IN | BODY MASS INDEX: 27.92 KG/M2 | WEIGHT: 195 LBS

## 2021-06-01 VITALS — WEIGHT: 190 LBS | BODY MASS INDEX: 26.6 KG/M2 | HEIGHT: 71 IN

## 2021-06-02 VITALS — WEIGHT: 198 LBS | BODY MASS INDEX: 27.72 KG/M2 | HEIGHT: 71 IN

## 2021-06-02 VITALS — BODY MASS INDEX: 28 KG/M2 | WEIGHT: 200 LBS | HEIGHT: 71 IN

## 2021-06-02 VITALS — BODY MASS INDEX: 27.85 KG/M2 | WEIGHT: 199.7 LBS

## 2021-06-02 VITALS — WEIGHT: 200 LBS | HEIGHT: 71 IN | BODY MASS INDEX: 28 KG/M2
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
## 2021-06-02 NOTE — TELEPHONE ENCOUNTER
The dizziness started 10 days ago.  Weak up on standing.  The Pt feels the room is spinning.  Pt able to walk.  Standing and changing position make it worse.  Pt has dizziness before.  Pt checked his BP last week and it was okay.  No fever, no chest pain, no vomit, no diarrhea.  The disposition is to be seen with in 3 days.  Care advice given per protocol.  Patient wife agrees with care advice given.   Appointment is made for the Pt to seen in 2 days.  Agreed to call back if he has additional symptoms or questions.      Jeromy Frye RN, [facility] Triage/Med Refill 10/7/2019 4:37 PM      Reason for Disposition    [1] MILD dizziness (e.g., walking normally) AND [2] has NOT been evaluated by physician for this  (Exception: dizziness caused by heat exposure, sudden standing, or poor fluid intake)    Protocols used: DIZZINESS - [id number]-HK

## 2021-06-03 VITALS — WEIGHT: 200.7 LBS | BODY MASS INDEX: 27.99 KG/M2

## 2021-06-03 VITALS
WEIGHT: 198.5 LBS | BODY MASS INDEX: 27.69 KG/M2 | DIASTOLIC BLOOD PRESSURE: 80 MMHG | SYSTOLIC BLOOD PRESSURE: 112 MMHG | HEART RATE: 80 BPM

## 2021-06-03 VITALS — BODY MASS INDEX: 27.6 KG/M2 | WEIGHT: 197.9 LBS

## 2021-06-04 VITALS
BODY MASS INDEX: 27.48 KG/M2 | SYSTOLIC BLOOD PRESSURE: 122 MMHG | HEART RATE: 63 BPM | WEIGHT: 197 LBS | OXYGEN SATURATION: 87 % | DIASTOLIC BLOOD PRESSURE: 72 MMHG

## 2021-06-05 ENCOUNTER — RECORDS - HEALTHEAST (OUTPATIENT)
Dept: SCHEDULING | Facility: CLINIC | Age: 71
End: 2021-06-05

## 2021-06-05 VITALS
BODY MASS INDEX: 28.03 KG/M2 | SYSTOLIC BLOOD PRESSURE: 132 MMHG | DIASTOLIC BLOOD PRESSURE: 76 MMHG | WEIGHT: 200.2 LBS | OXYGEN SATURATION: 91 % | HEIGHT: 71 IN | HEART RATE: 89 BPM

## 2021-06-05 VITALS — DIASTOLIC BLOOD PRESSURE: 72 MMHG | BODY MASS INDEX: 28.14 KG/M2 | SYSTOLIC BLOOD PRESSURE: 124 MMHG | WEIGHT: 200.1 LBS

## 2021-06-05 DIAGNOSIS — M25.559 PAIN IN JOINT, PELVIC REGION AND THIGH: ICD-10-CM

## 2021-06-07 NOTE — TELEPHONE ENCOUNTER
Phone call to patient to inform that prescription was authorized and has been sent to their pharmacy. Stated understanding and appreciation for call back.

## 2021-06-07 NOTE — TELEPHONE ENCOUNTER
"Patient's wife calling to request refill of the Gabapentin 100 mg capsules. \"He takes 1 twice a day and he has 4 left. I don't want him to run out and I don't want him to have to come in right now.\" Chart reviewed. Patient last seen in clinic per [facility] 12/2018. Prescription prepped for provider review. Pharmacy verified.     Patient is willing to be seen in clinic after COVID concerns.  "
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
## 2021-06-08 NOTE — PROGRESS NOTES
Assessment:     1. Shoulder pain, right         Plan:     1. Shoulder pain, right  Patient will be placed on muscle relaxer and anti-inflammatory combination.  He will discontinue the Naprosyn      Subjective:   Duane is noticed a flare of his right shoulder discomfort in the lower scapular region.  He got some relief from Naprosyn, but now it's coming and going.  He is about to leave for [place] and [place].  He has no radiation of the pain into the ribs are anteriorly or down the arm or up into the neck.  Exam confirms that this isn't a flare of his fibrositis more likely a muscle spasm.  We will treat him with a combination medication.  He denies any shortness of breath, dyspnea, chest pain, angina, or abdominal pain.  He is having a root canal this afternoon and has been cleared for that procedure.  All questions that were asked were answered I personally reviewed, family, social history, surgeries, allergies, problem list, he had a few questions about his cholesterol medicine which he is continuing to take under the direction of medical therapy management.  I will see him for follow-up when he returns in the spring    Review of Systems: A complete 14 point review of systems was obtained and is negative or as stated in the history of present illness.    Past Medical History   Diagnosis Date     Arthritis      Diverticulitis      GERD (gastroesophageal reflux disease)      Family History   Problem Relation Age of Onset     Dementia Father      Colon cancer Sister      Anesthesia problems Neg Hx      Past Surgical History   Procedure Laterality Date     Pr removal prepatella bursa       Description: Excision Of Prepatellar Bursa;  Recorded: 05/12/2014;     Fracture surgery       Appendectomy       Joint replacement Right knee     Fistula reemoval intestines       Rectal prolapse repair N/A 8/6/2015     Procedure: SIGMOID RESECTION;  Surgeon: Juan Ramon Carrion MD;  Location: [facility];  Service:      [facility]   "8/13/2015           Social History   Substance Use Topics     Smoking status: Former Smoker     Quit date: 1/1/1994     Smokeless tobacco: Never Used      Comment: quit 2002     Alcohol use Yes      Comment: occasionally          Objective:     Visit Vitals     /80     Pulse 92     Ht 5' 10.25\" (1.784 m)     Wt 203 lb (92.1 kg)     SpO2 93%     BMI 28.92 kg/m2       General Appearance:  Alert, cooperative, no distress  Head:  Normocephalic, no obvious abnormality  Ears: TM anatomy normal  Eyes:  PERRL, EOM's intact, conjunctiva and corneas clear  Nose:  Nares symmetrical, septum midline, mucosa pink, no sinus tenderness  Throat:  Lips, tongue, and mucosa are moist, pink, and intact  Neck:  Supple, symmetrical, trachea midline, no adenopathy; thyroid: no enlargement, symmetric,no tenderness/mass/nodules; no carotid bruit, no JVD  Back:  Symmetrical, no curvature, ROM normal, no CVA tenderness  Chest/Breast:  No mass or tenderness  Lungs:  Clear to auscultation bilaterally, respirations unlabored   Heart:  Normal PMI, regular rate & rhythm, S1 and S2 normal, no murmurs, rubs, or gallops  Abdomen:  Soft, non-tender, bowel sounds active all four quadrants, no mass, or organomegaly  Musculoskeletal:  Tone and strength strong and symmetrical, all extremities.  Patient has some spasm in the infrascapular area on the right side consistent with fibro situs/muscle spasm  Lymphatic:  No adenopathy  Skin/Hair/Nails:  Skin warm, dry, and intact, no rashes  Neurologic:  Alert and oriented x3, no cranial nerve deficits, normal strength and tone, gait steady  Extremities:  No edema.  Edwin's sign negative.    Genitourinary: deferred  Pulses:  Equal bilaterally     The following high BMI interventions were performed this visit: weight monitoring      This note has been dictated using voice recognition software. Any grammatical or context distortions are unintentional and inherent to the the software.   "

## 2021-06-08 NOTE — PROGRESS NOTES
"[facility] Daily Progress     Patient Name: Nando Carreno  Date: 2017  Visit #: 3  PTA visit #:     Referral Diagnosis: cervical pain  Referring provider: Luis A Teixeira,   Visit Diagnosis:     ICD-10-CM    1. Cervical radiculitis M54.12    2. Myofascial pain M79.1    3. Thoracic spine pain M54.6          Assessment:     Patient is benefitting from skilled physical therapy and is making steady progress toward functional goals.  Patient is appropriate to continue with skilled physical therapy intervention, as indicated by initial plan of care.  Good release of arterial fascial tension and moderate increase in ROM.     Plan / Patient Education:     Plan to DC next visit as he will in [place] for next 2 months.      Subjective:     Pain Ratin-3 mostly and 4-5 at the most.    He is feeling pretty good. He is starting to feel actually pretty normal. The pain is \"much better\". It isn't as intense as it was. He is still able to feel it there but the pain is more of a nagging than painful. It does flare it up when he shaves in the morning. Driving in the car doesn't flare it up anymore.      Objective:     Art fascial tension.   C-rot:  65/68  C flex/ext: 60/50 with no pain    Treatment Today   2017   TREATMENT MINUTES COMMENTS   Evaluation     Self-care/ Home management     Manual therapy 25 Fascial release using Strain-Counterstrain of R cervical spine/scap/sternal-LV   Neuromuscular Re-education     Therapeutic Activity     Therapeutic Exercises     Gait training     Modality__________________                Total 25    Blank areas are intentional and mean the treatment did not include these items.       Juan Ramon Marcelino  2017    "

## 2021-06-08 NOTE — PROGRESS NOTES
The patient presents for EMG of right upper extremity.  He has 3-4 weeks of right arm numbness and tingling to digits 4 and 5 with right parascapular pain and neck pain.  He is right-handed.  Multilevel degenerative changes on MRI with history of laminoplasty.  Gabapentin has been somewhat helpful along with the nabumetone.  Has had one visit of physical therapy    EMG/NCS  results: Please see scanned full report    Comment NCS: Normal study  1.  Normal nerve conduction studies right upper extremity    Comment EMG: Abnormal study  1.  Increased insertional activity right anconeus, FDI, APB.  2.  Large motor unit potentials in the right tricep    Interpretation: Abnormal study    1.  Electrodiagnostic findings are most consistent with a right C8 and/orT1 radiculopathy, active.    2.  There are some chronic motor unit potential changes in the right tricep.  Given this finding, I cannot exclude a concomitant right C7 radiculopathy, chronic and inactive.    3. There is no electrodiagnostic evidence of   focal neuropathy in the right upper extremity.  Brachial plexopathy/lower trunk involvement is less likely given normal nerve conduction studies.     Note: He is going to continue with physical therapy and medications.  He will contact us next week as a cervical epidural (C7-T1 interlaminar) would be a good option to help if his pain does not improve with medications and physical therapy.  He is traveling to [place] and a few weeks.    The testing was completed in its entirety by the physician.       It was our pleasure caring for your patient today, if there any questions or concerns please do not hesitate to contact us.

## 2021-06-08 NOTE — PROGRESS NOTES
[facility] Daily Progress     Patient Name: Nando Carreno  Date: 2017  Visit #: 2  PTA visit #:     Referral Diagnosis: cervical pain  Referring provider: Luis A Teixeira DO  Visit Diagnosis:     ICD-10-CM    1. Cervical radiculitis M54.12    2. Myofascial pain M79.1    3. Thoracic spine pain M54.6          Assessment:     Patient is benefitting from skilled physical therapy and is making steady progress toward functional goals.  Patient is appropriate to continue with skilled physical therapy intervention, as indicated by initial plan of care.  Good release of fascial tension with treatment. He is feeling better overall.     Plan / Patient Education:     Plan to con't with manual therapy to decrease fascial tension/tone to normalize ROM/decrease inflammation/decrease mm tone and improve proprioception.      Subjective:     Pain Ratin or less.   Things are better. It did hurt when he went home but it is less intense in the back now. There is something still there but not the sharp pain.   He did get an EMG.     Objective:     LV fascial tension.     Treatment Today   2017   TREATMENT MINUTES COMMENTS   Evaluation     Self-care/ Home management     Manual therapy 25 Fascial release using Strain-Counterstrain of R cervical spine/scap/sternal-LV   Neuromuscular Re-education     Therapeutic Activity     Therapeutic Exercises     Gait training     Modality__________________                Total 25    Blank areas are intentional and mean the treatment did not include these items.       Juan Ramon Marcelino  2017

## 2021-06-09 NOTE — PROGRESS NOTES
Hospital Follow-up Visit:    Assessment/Plan:     1. Dizziness  Patient's work-up at the hospital was reviewed; no formal cause of his recurrent dizziness and tinnitus has been established and patient would benefit from ear nose and throat evaluation with ECOG studies  - Ambulatory referral to ENT    2. 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 male who presents for a hospital discharge follow up.  Patient was hospitalized in the emergency room and evaluated for severe dizziness lightheadedness; underwent CT of sinuses MRI brain MRA also performed appropriate laboratory done.  Patient was discharged on Antivert and instructed to follow-up with primary care.  His symptoms remain intermittent.  He gets severe intermittent dizziness lightheadedness almost to the point of vertigo with documented nystagmus about 3-4 times a year which becomes disabling requiring him to go to the hospital and have examination and images to rule out deep-seated problems.  Patient has not had formal ear nose and throat evaluation.  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  Patient has had increasing tinnitus mainly in his left side.  Primary care recommends referral to ear nose and throat for ECOG studies and further evaluation.  Evaluation at tertiary Medical Center such as [facility] also may be indicated.  All medical questions asked were answered definitive answer underway with ear nose and throat evaluation been placed.30 minute       Hospital/Nursing Home/IP Rehab Facility: [facility]  Date of Admission: 7/4/20  Date of Discharge:7/5/20  Reason(s) for Admission:Dizziness            Do you have any problems taking  your medication regularly?  None       Have you had any changes in your medication since discharge? None       Have you had any difficulty following your discharge or treatment plan?  No    Summary of hospitalization:  Hospital discharge summary reviewed  Diagnostic Tests/Treatments reviewed.  Follow up needed: None  Other Healthcare Providers Involved in Patient's Care: Patient Care Team:  Misael Costa MD as PCP - Ingrid Anderson PharmD as Pharmacist (Pharmacist)  Misael Costa MD as Assigned PCP      Update since discharge: {no change   Information from family, SNF, care coordination: Patient    Post Discharge Medication Reconciliation: discharge medications reconciled, continue medications without change  Plan of care communicated with: patient    Objective:     Vitals:    07/20/20 0854   Weight: 197 lb (89.4 kg)         Physical Exam:  General Appearance:  Alert, cooperative, no distress  Head:  Normocephalic, no obvious abnormality  Ears: TM anatomy normal  Eyes:  PERRL, EOM's intact, conjunctiva and corneas clear  Nose:  Nares symmetrical, septum midline, mucosa pink, no sinus tenderness  Throat:  Lips, tongue, and mucosa are moist, pink, and intact  Neck:  Supple, symmetrical, trachea midline, no adenopathy; thyroid: no enlargement, symmetric,no tenderness/mass/nodules; no carotid bruit, no JVD  Back:  Symmetrical, no curvature, ROM normal, no CVA tenderness  Chest/Breast:  No mass or tenderness  Lungs:  Clear to auscultation bilaterally, respirations unlabored   Heart:  Normal PMI, regular rate & rhythm, S1 and S2 normal, no murmurs, rubs, or gallops  Abdomen:  Soft, non-tender, bowel sounds active all four quadrants, no mass, or organomegaly  Musculoskeletal:  Tone and strength strong and symmetrical, all extremities  Lymphatic:  No adenopathy  Skin/Hair/Nails:  Skin warm, dry, and intact, no rashes  Neurologic:  Alert and oriented x3, no cranial nerve deficits, normal strength and tone,  gait steady  Extremities:  No edema.  Edwin's sign negative.    Genitourinary: deferred  Pulses:  Equal bilaterally      Coding guidelines for this visit:  Type of Medical   Decision Making Face-to-Face Visit       within 7 Days of discharge Face-to-Face Visit        within 14 days of discharge   Moderate Complexity 40021 [id number]   High Complexity 15928 [id number]       Electronically signed by Misael Costa MD 07/20/20 8:55 AM

## 2021-06-09 NOTE — PROGRESS NOTES
[facility] Discharge Summary  Patient Name: Nando Carreno  Date: 3/22/2017  Referral Diagnosis: Cervical radiculitis   Referring provider: Luis A Teixeira DO  Visit Diagnosis:   1. Cervical radiculitis     2. Myofascial pain     3. Thoracic spine pain         Goals:  Pt. will demonstrate/verbalize independence in self-management of condition in : 12 weeks  Pt. will report decreased intensity, frequency of : Pain;in 6 weeks;Comment  Comment:: decrease pain to 0-4/10 with ADLs.   Pt. will have improved quality of sleep: in 6 weeks;Comment  Comment:: verbalize decreased pain to allow him to sleep through the night and fall asleep when waking early  Patient Turn Head: for driving;for watching traffic;for conversation;in 6 weeks;Comment  Comment: increase C-rot to >70 deg B  Patient will : for 10 minutes;with no pain;in 6 weeks;Comment  Comment: shave and sit  Patient will decrease : KRANTHI score;by _ points;for improved quality of function;for improved quality of life;in 12 weeks  by ___ points: 10    Patient was seen for 3 visits from 1/17/17 to 1/24/17 and cancelled his remaining 4 visits.  The patient attended therapy initially, but did not finish the therapy sessions prescribed.  Goals were not fully achieved. Explanation for goals not achieved: he was progressing nicely with good improvement in pain and ROM.    Therapy will be discontinued at this time.  The patient will need a new referral to resume.    Thank you for your referral.  Juan Ramon Marcelino  3/22/2017  8:37 AM

## 2021-06-10 NOTE — PROGRESS NOTES
Assessment/Plan:      Diagnoses and all orders for this visit:    Cervicalgia    Cervical radicular pain    Neural foraminal stenosis of cervical spine    Myofascial muscle pain      Assessment:    1.  Cervical spine pain with right parascapular pain and paresthesias in the right arm to digits 4 and 5.  Symptoms much improved.  He did have an EMG positive for C8-T1 radiculopathy on the right.  Doing well with gabapentin and has completed physical therapy.  2.  Myofascial pain.  3.  Significant cervical degenerative disc disease and facet arthropathy resulting in severe foraminal stenosis bilaterally at multiple levels.  Most significant on the right at C7-T1.      Discussion:    1.  Doing well at this point with gabapentin 100 mg 3 times daily and no side effects.  Continue with this medication.  2.  Continue with any home exercises from physical therapy.  3.  Follow-up with me as needed if symptoms worsen.      It was our pleasure caring for your patient today, if there any questions or concerns please do not hesitate to contact us.      Subjective:   Patient ID: Nando Carreno is a 66 y.o. male.    History of Present Illness: Patient presents for follow-up of cervical spine and right arm pain and paresthesias.  Last seen in January of this year.  After that did some physical therapy and was taking gabapentin.  Gabapentin 100 mg 3 times daily is quite significantly helpful.  No pain while taking the medication.  Did forget his midday dose today and is having some mild pain at the base of the cervical spine.  No radiation down the arm and no weakness.  Only took nabumetone on 1 or 2 occasions.    Imaging: I reviewed MRI of the cervical spine which shows multilevel degenerative disc disease and facet arthropathy.  Previous decompression.  At C7-T1 on the right there is severe foraminal stenosis.    Review of systems: No numbness, tingling or weakness.  No bowel or bladder incontinence.  No headaches, dizziness,  nausea, vomiting, blurred vision or balance deficits.    Past Medical History:   Diagnosis Date     Arthritis      Diverticulitis      GERD (gastroesophageal reflux disease)        The following portions of the patient's history were reviewed and updated as appropriate: allergies, current medications, past family history, past medical history, past social history, past surgical history and problem list.      Objective:   Physical Exam:    There were no vitals filed for this visit.    General: Alert and oriented with normal affect. Attention, knowledge, memory, and language are intact. No acute distress.   Eyes: Sclerae are clear.  Respirations: Unlabored.    Gait:  Nonantalgic    Sensation is intact to light touch throughout the upper  extremities.  Reflexes are 1+ and symmetric in the biceps triceps and brachioradialis with negative Hoffmans.      Manual muscle testing reveals:  Right /Left out of 5  5/5 shoulder abductors  5/5 elbow flexors  5/5 elbow extensors  5/5 wrist extensors  5/5 interosseus  5/5 finger flexors

## 2021-06-10 NOTE — PROGRESS NOTES
Rehabilitation Certification Request    August 11, 2020      Patient: Nando Carreno  MR Number: [id number]  YOB: 1950  Date of Visit: 8/11/2020      Dear Nelida Clayton PA-C:    Thank you for this referral.   We are seeing Nando Carreno in Occupational Therapy for dizziness.    Medicare and/or Medicaid requires physician review and approval of the treatment plan. Please review the plan of care and verify that you agree with the therapy plan of care by co-signing this note.      Plan of Care  Authorization / Certification Start Date: 08/11/20  Authorization / Certification End Date: 11/09/20  Authorization / Certification Number of Visits: 12  Communication with: Referral Source  Patient Related Instruction: Nature of Condition;Treatment plan and rationale;Basis of treatment  Times per Week: 1  Number of Weeks: 12  Number of Visits: 12  Neuromuscular Reeducation: vestibular  Functional Training (ADL's): compensatory training  Canolith Repostioning: .      Goals:  Patient will be able to walk: with head motion;without loss of balance;in 12 weeks  Patient will bend: to dress;without dizziness;without loss of balance;in 12 weeks  Patient will turn head: for conversation;without dizziness;in 12 weeks  Patient will look up / down: for drinking;without dizziness;in 12 weeks        If you have any questions or concerns, please don't hesitate to call.    Sincerely,      Tara Trevino, OT        Physician recommendation:                                ___ Follow therapist's recommendation                                                                                                    ___ Modify therapy                                                                                                      Physician Signature:_____________________                                                                                                                                         Date:___________________________    *Physician co-signature indicates they certify the need for these services furnished within this plan and while under their care.         Vestibular Initial Evaluation    Patient Name: Nando Carreno  Date of evaluation: 8/11/2020  Referral Diagnosis: dizziness  Referring provider: Nelida Clayton PA-C  Visit Diagnosis:     ICD-10-CM    1. Dizziness  R42    2. Unsteadiness on feet  R26.81    3. Decreased activities of daily living (ADL)  Z78.9        Assessment:      Symptoms are consistent with weakened vestibular function. Patient will resume his previous adaptation exercise and return for follow up once we receive authorization from his insurance.    Goals:  Patient will be able to walk: with head motion;without loss of balance;in 12 weeks  Patient will bend: to dress;without dizziness;without loss of balance;in 12 weeks  Patient will turn head: for conversation;without dizziness;in 12 weeks  Patient will look up / down: for drinking;without dizziness;in 12 weeks      Patient's expectations/goals are realistic.    Barriers to Learning or Achieving Goals:  No Barriers.       Plan / Patient Instructions:        Plan of Care:   Authorization / Certification Start Date: 08/11/20  Authorization / Certification End Date: 11/09/20  Authorization / Certification Number of Visits: 12  Communication with: Referral Source  Patient Related Instruction: Nature of Condition;Treatment plan and rationale;Basis of treatment  Times per Week: 1  Number of Weeks: 12  Number of Visits: 12  Neuromuscular Reeducation: vestibular  Functional Training (ADL's): compensatory training  Canolith Repostioning: .         Subjective:         History of Present Illness:    Nando is a 70 y.o. male who presents to therapy today with complaints of vertigo and unsteadiness. Patient had sudden onset of symptoms July 4, 2020. Symptoms are getting better. He denies history of similar symptoms. Patient denies  ear pain. Patient has long standing left hearing loss. Functional limitations are described as occurring with balance, bending, bed mobility, head turns, looking up or down, stepping over curbs.    Pain Ratin         Objective:      Note: Items left blank indicates the item was not performed or not indicated at the time of the evaluation.    Patient Outcome Measures:   Dizziness Handicap Inventory  Score in %: 44       Vestibular Disorder Examination  1. Dizziness     2. Unsteadiness on feet     3. Decreased activities of daily living (ADL)         Precautions/Restrictions: None    Posture Observation: General standing posture is normal.    ROM:  Not Tested    Strength: Not Tested    Sensation: patient reports normal sensation    Functional Mobility: good      Modified CTSIB: Per ENT patient has vestibular pattern on balance tests  Normal Surface Eyes Open-  Normal Surface Eyes Closed-  Perturbed Surface Eyes Open-  Perturbed Surface Eyes Closed-    The remainder of the tests are performed in room light.    Oculomotor Assessment:   Spontaneous Nystagmus with fixation: Negative  Spontaneous Nystagmus without fixation:   Gaze-evoked nystagmus: Negative  Smooth pursuit: Normal  Saccades: Normal  VOR to slow movement: Normal  Rapid head shake test: no nystagmus but patient reports dizziness  VOR Head Thrust: NT  VOR Cancellation: NT  OPK's: NT  Static Visual Acuity: NT  Dynamic Visual Acuity: NT    Positional Tests:  Hallpike Right:  no nystagmus but patient reports dizziness  Hallpike Left:  no nystagmus but patient reports dizziness  Head Roll Right: NT  Head Roll Left:  NT    Plan for next visit: repeat positional tests and if still negative, instruct on HEP    Treatment Today:  Evaluation only. Will request authorization to treat from [facility]. Patient reports that he had X1 viewing exercise in the past from some dizziness after his neck surgery. He will resume this exercise while waiting for authorization for  treatment.  TREATMENT MINUTES COMMENTS   Evaluation 20    Self-care/ Home management     Neuromuscular Re-education     Canalith repositioning procedure           Total 20    Blank areas are intentional and mean the treatment did not include these items.     GOALS AND PLAN OF CARE WERE ESTABLISHED IN COOPERATION WITH THE PATIENT    OT Evaluation Code: (Please list factors)   Comorbidities:   Patient Active Problem List   Diagnosis     Asthma     Overweight     Hyperlipemia     Esophageal reflux     Influenza     Benign Essential Hypertension     Enthesopathy Of The Left Knee     Diverticulosis     Diverticulitis Of Colon     Arthralgia     Polyarthropathy     Wrist arthritis     Shoulder impingement     Inflammatory arthritis     Diverticulitis with perforation     Diverticula, colon     Melanoma (H)     Cervical nerve root compression       Profile/History Review: Brief    Need for eval modification: No    # Treatment options: Limited    Clinical Decision Making:  Low      Occupational Profile/ Medical and Therapy History and Comorbidities Occupational Performance Clinical Decision Making   (Complexity)   brief history with review of medical/therapy records related to the presenting problem.  No comorbidities 1-3 Performance deficits that result in activity limitations and/or participation restrictions.    No Assessment Modification  Low complexity, which includes  problem-focused assessments, and consideration of a limited number of treatment options.      expanded review of medical/therapy records and additional review of physical, cognitive and psychosocial history.    May have comorbidities 3-5 Performance deficits that result in activity limitations and/or participation restrictions.    Minimal to moderate modification of assessment Moderate complexity, which includes analysis of data from detailed assessments, and consideration of several treatment options.         Review of medical/therapy records and  extensive additional review of physical, cognitive and psychosocial history.  Comorbidities affect occupational performance 5 or more Performance deficits that result in activity limitations and/or participation restrictions.    Significant modification of assessment High complexity, analysis of  Occupational profile and data,  Comprehensive assessments, multiple treatment options.            Tara Trevino  8/11/2020  7:35 AM

## 2021-06-10 NOTE — PROGRESS NOTES
Rehabilitation Daily Progress     Patient Name: Nando Carreno  Date: 2020  Visit #: 2  Referral Diagnosis: dizziness  Referring provider: Misael Costa MD  Visit Diagnosis:     ICD-10-CM    1. Dizziness  R42    2. Unsteadiness on feet  R26.81    3. Decreased activities of daily living (ADL)  Z78.9          Assessment:     Patient is benefitting from skilled occupational therapy and is making steady progress toward functional goals.    Goal Status:  Patient will be able to walk: with head motion;without loss of balance;in 12 weeks  Patient will bend: to dress;without dizziness;without loss of balance;in 12 weeks  Patient will turn head: for conversation;without dizziness;in 12 weeks  Patient will look up / down: for drinking;without dizziness;in 12 weeks      Plan / Patient Education:     Continue with initial plan of care. Progress with home program as tolerated.    Subjective:     Pain Ratin    Subjective progress relative to last visit:  Intensity of dizziness is:  less  Frequency of dizziness is: less  Duration of dizziness is: less  Balance is:  better    Objective:     Positional Tests:  Hallpike Right:  NT  Hallpike Left:  NT  Head Roll Right: NT  Head Roll Left:  NT    Treatment Today: Patient instructed on adaptation and substitution exercises today to facilitate vestibular compensation.  X1 viewing-10 seconds-continue  Targets-20 seconds -continue  Normal surface eyes closed-instructed  Gait with head motion-instructed  TREATMENT MINUTES COMMENTS   Evaluation     Self-care/ Home management     Neuromuscular Re-education 24    Canalith repositioning procedure           Total 24    Blank areas are intentional and mean the treatment did not include these items.          Tara Trevino  2020  8:32 AM

## 2021-06-11 NOTE — PROGRESS NOTES
Discharge Summary  Patient Name: Nando Carreno  Date: 2020  Referral Diagnosis: dizziness  Referring provider: Misael Costa MD  Visit Diagnosis:   1. Dizziness     2. Unsteadiness on feet     3. Decreased activities of daily living (ADL)         Goal Status: goals met  Patient will be able to walk: with head motion;without loss of balance;in 12 weeks  Patient will bend: to dress;without dizziness;without loss of balance;in 12 weeks  Patient will turn head: for conversation;without dizziness;in 12 weeks  Patient will look up / down: for drinking;without dizziness;in 12 weeks    Patient was seen for 4 visits between 2020 and 10-1-2020.    Therapy will be discontinued at this time.  The patient will need a new referral to resume.    Thank you for your referral.  Tara SILVA Trevino  2020   7:34 PM    Rehabilitation Daily Progress     Patient Name: Nando Carreno  Date: 10/1/2020  Visit #: 4  Referral Diagnosis: dizziness  Referring provider: Misael Costa MD  Visit Diagnosis:     ICD-10-CM    1. Dizziness  R42    2. Unsteadiness on feet  R26.81    3. Decreased activities of daily living (ADL)  Z78.9          Assessment:     Patient is benefitting from skilled occupational therapy and is making steady progress toward functional goals. Patient reports improved balance.     Goal Status:  Patient will be able to walk: with head motion;without loss of balance;in 12 weeks  Patient will bend: to dress;without dizziness;without loss of balance;in 12 weeks  Patient will turn head: for conversation;without dizziness;in 12 weeks  Patient will look up / down: for drinking;without dizziness;in 12 weeks      Plan / Patient Education:     Continue with initial plan of care. Progress with home program as tolerated.    Subjective:     Pain Ratin    Subjective progress relative to last visit: no dizziness or vertigo    Objective:     Positional Tests:  Hallpike Right:  Negative  Hallpike Left:   Negative  Head Roll Right: NT  Head Roll Left:  NT    Treatment Today: Patient instructed on adaptation and substitution exercises today to facilitate vestibular compensation.  X1 viewing-60 seconds-discontinue  Targets-60 seconds -discontinue  VOR suppression-discontinue    Tandem stance eyes open-continue  Gait with head motion-continue  Normal surface eyes closed-continue but instructed to add perturbed surface  Figure 8 turns-instructed    TREATMENT MINUTES COMMENTS   Evaluation     Self-care/ Home management     Neuromuscular Re-education 25    Canalith repositioning procedure           Total 25    Blank areas are intentional and mean the treatment did not include these items.          Tara Trevino  10/1/2020  11:06 AM

## 2021-06-11 NOTE — PROGRESS NOTES
Assessment:     1. Need for immunization against influenza  Influenza,Quad,High Dose,PF 65 YR+   2. Inflammatory arthritis         Plan:     1. Need for immunization against influenza  Updated as follows  - Influenza,Quad,High Dose,PF 65 YR+    2. Inflammatory arthritis  Patient does have an inflammatory arthritis generalized as well as some musculoskeletal flank discomfort he is on minimal amount of gabapentin which is 100 mg total per day.  I feel he will benefit from an increase in the dosage to 200 mg/day and will begin with that he does not get any relief from this flank pain which comes on later in the afternoon we would increase it to 3 times daily.  We have consulted with neurology physical therapy and orthopedic surgery and have had no negative imaging and new imaging is not indicated at this time      Subjective:   Patient complaining of bilateral lower trunk discomfort coming on later in the day aching in nature seems to go away after a night of rest.  Patient is on gabapentin 100 mg.  Extensive work-up in the past including consultations with neurology rheumatology and orthopedic surgery have all come up with a normal diagnosis.  He is responded nicely to gabapentin but is on a minimal dosage.  I would favor increasing his gabapentin to 200 mg/day for 2 weeks and then increasing to 300 mg/day if he feels to get any relief.  He is also getting worked up for vestibulitis and is attending the vertigo clinic.  He is getting good results from that.  Past medical history reviewed medication review history done alteration of medication done today.  I will refill more gabapentin for him when he runs out of the large supply that he has currently.  All medical questions asked were answered pleasure to see him again.  We will follow along with any other consultations.  21-minute visit.    Review of Systems: A complete 14 point review of systems was obtained and is negative or as stated in the history of present  "illness.    Past Medical History:   Diagnosis Date     Arthritis      Cancer (H)     melanoma excised from right cheek     Cervical nerve root compression      Diverticulitis      GERD (gastroesophageal reflux disease)      Hyperlipidemia      Family History   Problem Relation Age of Onset     Dementia Father      Colon cancer Sister      Anesthesia problems Neg Hx      Past Surgical History:   Procedure Laterality Date     APPENDECTOMY       BACK SURGERY       CERVICAL LAMINECTOMY N/A 2018    Procedure: RIGHT CERVICAL 6-7, CERVICAL 7- THORACIC 1, THORACIC 1-2 HEMILAMINOTOMY MEDIAL FACETECTOMY ANF FORAMINOTOMY;  Surgeon: Brittany Victor MD;  Location: [facility] Main OR;  Service:      CERVICAL SPINE SURGERY       COLON SURGERY      sigmoid resection for diverticulitis     fistula reemoval intestines       FRACTURE SURGERY       JOINT REPLACEMENT Right     right TKA     LUMBAR SPINE SURGERY      X2     PICC  2015          MO REMOVAL PREPATELLA BURSA Left     Description: Excision Of Prepatellar Bursa;  Recorded: 2014;     rectal fistula repair      multiple     RECTAL PROLAPSE REPAIR N/A 2015    Procedure: SIGMOID RESECTION;  Surgeon: Juan Ramon Carrion MD;  Location: [facility] OR;  Service:      Social History     Tobacco Use     Smoking status: Former Smoker     Last attempt to quit: 1994     Years since quittin.7     Smokeless tobacco: Never Used   Substance Use Topics     Alcohol use: Yes     Comment: occasionally      Drug use: No         Objective:   /76 (Patient Site: Right Arm, Patient Position: Sitting, Cuff Size: Adult Large)   Pulse 89   Ht 5' 10.71\" (1.796 m)   Wt 200 lb 3.2 oz (90.8 kg)   SpO2 91%   BMI 28.15 kg/m      General Appearance:  Alert, cooperative, no distress  Head:  Normocephalic, no obvious abnormality  Ears: TM anatomy normal  Eyes:  PERRL, EOM's intact, conjunctiva and corneas clear  Nose:  Nares symmetrical, septum midline, mucosa pink, " no sinus tenderness  Throat:  Lips, tongue, and mucosa are moist, pink, and intact  Neck:  Supple, symmetrical, trachea midline, no adenopathy; thyroid: no enlargement, symmetric,no tenderness/mass/nodules; no carotid bruit, no JVD  Back:  Symmetrical, no curvature, ROM normal, no CVA tenderness  Chest/Breast:  No mass or tenderness  Lungs:  Clear to auscultation bilaterally, respirations unlabored   Heart:  Normal PMI, regular rate & rhythm, S1 and S2 normal, no murmurs, rubs, or gallops  Abdomen:  Soft, non-tender, bowel sounds active all four quadrants, no mass, or organomegaly  Musculoskeletal:  Tone and strength strong and symmetrical, all extremities patient is tender in the bilateral flank area which seems to come on later in the afternoon we have worked this up extensively and has had physical therapy in the past without relief alteration and adjustment of gabapentin indicated  Lymphatic:  No adenopathy  Skin/Hair/Nails:  Skin warm, dry, and intact, no rashes  Neurologic:  Alert and oriented x3, no cranial nerve deficits, normal strength and tone, gait steady  Extremities:  No edema.  Edwin's sign negative.    Genitourinary: deferred  Pulses:  Equal bilaterally           This note has been dictated using voice recognition software. Any grammatical or context distortions are unintentional and inherent to the the software.

## 2021-06-11 NOTE — PROGRESS NOTES
Rehabilitation Daily Progress     Patient Name: Nando Carreno  Date: 2020  Visit #: 3  Referral Diagnosis: dizziness  Referring provider: Misael Costa MD  Visit Diagnosis:     ICD-10-CM    1. Dizziness  R42    2. Unsteadiness on feet  R26.81    3. Decreased activities of daily living (ADL)  Z78.9          Assessment:     Patient is benefitting from skilled occupational therapy and is making steady progress toward functional goals. Patient reports no severe episodes of vertigo for the past 2 weeks but has mild disequilibrium.    Goal Status:  Patient will be able to walk: with head motion;without loss of balance;in 12 weeks  Patient will bend: to dress;without dizziness;without loss of balance;in 12 weeks  Patient will turn head: for conversation;without dizziness;in 12 weeks  Patient will look up / down: for drinking;without dizziness;in 12 weeks      Plan / Patient Education:     Continue with initial plan of care. Progress with home program as tolerated.    Subjective:     Pain Ratin    Subjective progress relative to last visit: no dizziness or vertigo    Objective:     Positional Tests:  Hallpike Right:  Negative  Hallpike Left:  Negative  Head Roll Right: NT  Head Roll Left:  NT    Treatment Today: Patient instructed on adaptation and substitution exercises today to facilitate vestibular compensation.  X1 viewing-25 seconds-continue  Targets-25 seconds -continue  Normal surface eyes closed-continue  Gait with head motion-continue  VOR suppression-instructed  Tandem stance eyes open-instructed    TREATMENT MINUTES COMMENTS   Evaluation     Self-care/ Home management     Neuromuscular Re-education 25    Canalith repositioning procedure           Total 25    Blank areas are intentional and mean the treatment did not include these items.          Tara Trevino  2020  2:32 PM

## 2021-06-12 NOTE — TELEPHONE ENCOUNTER
Called to discuss with patients wife. Patient is taking gabapentin 300 mg once daily.    rx pended for approval

## 2021-06-12 NOTE — TELEPHONE ENCOUNTER
Question following Office Visit  When did you see your provider: 9/23/2020  What is your question: Wife is calling to say the Gabapentin 300 mg was working very well and now we need this to be ordered as we are out of the Rx Please send ASAP  Okay to leave a detailed message: Yes

## 2021-06-13 NOTE — PROGRESS NOTES
Assessment:     1. Encounter for laboratory testing for COVID-19 virus     2. Bronchitis  albuterol (PROAIR HFA;PROVENTIL HFA;VENTOLIN HFA) 90 mcg/actuation inhaler    Symptomatic COVID-19 Virus (CORONAVIRUS) PCR       Plan:     1. Bronchitis  I have refilled the patient's inhaler  - albuterol (PROAIR HFA;PROVENTIL HFA;VENTOLIN HFA) 90 mcg/actuation inhaler; Inhale 2 puffs every 6 (six) hours as needed for wheezing.  Dispense: 1 each; Refill: 0  - Symptomatic COVID-19 Virus (CORONAVIRUS) PCR; Future    2. Encounter for laboratory testing for COVID-19 virus  Patient was advised to self quarantine until Covid test results come back and then we will discuss the situation      Subjective:   Zeeshan has had a long history of recurrent respiratory illness during the late fall and early spring he spends his time between here and [place].  He is says he is coming down with something and I have known this patient for over 30 years.  He had a slight cough about 4 to 5 days ago there has been no active Covid exposure he and his wife have been isolating at home wearing masks.  He is he was experiencing some tightness in his chest and was beginning some wheezing we refilled his inhaler which helped him a lot.  I have seen him here in the office today's exam revealed no wheezes I did not see any evidence of active infection however with his respiratory fragility I have ordered a COVID-19 test and have ordered him to quarantine until we do have the result.  He understands and repeated the instructions back to me.  I enjoyed seeing him again despite the pandemic circumstances and hopefully he will be well enough to return to [place] soon.    Review of Systems: A complete 14 point review of systems was obtained and is negative or as stated in the history of present illness.    Past Medical History:   Diagnosis Date     Arthritis      Cancer (H)     melanoma excised from right cheek     Cervical nerve root compression      Diverticulitis       GERD (gastroesophageal reflux disease)      Hyperlipidemia      Family History   Problem Relation Age of Onset     Dementia Father      Colon cancer Sister      Anesthesia problems Neg Hx      Past Surgical History:   Procedure Laterality Date     APPENDECTOMY       BACK SURGERY       CERVICAL LAMINECTOMY N/A 2018    Procedure: RIGHT CERVICAL 6-7, CERVICAL 7- THORACIC 1, THORACIC 1-2 HEMILAMINOTOMY MEDIAL FACETECTOMY ANF FORAMINOTOMY;  Surgeon: Brittany Victor MD;  Location: [facility];  Service:      CERVICAL SPINE SURGERY       COLON SURGERY      sigmoid resection for diverticulitis     fistula reemoval intestines       FRACTURE SURGERY       JOINT REPLACEMENT Right     right TKA     LUMBAR SPINE SURGERY      X2     PICC  2015          MI REMOVAL PREPATELLA BURSA Left     Description: Excision Of Prepatellar Bursa;  Recorded: 2014;     rectal fistula repair      multiple     RECTAL PROLAPSE REPAIR N/A 2015    Procedure: SIGMOID RESECTION;  Surgeon: Juan Ramon Carrion MD;  Location: [facility];  Service:      Social History     Tobacco Use     Smoking status: Former Smoker     Quit date: 1994     Years since quittin.8     Smokeless tobacco: Never Used   Substance Use Topics     Alcohol use: Yes     Comment: occasionally      Drug use: No         Objective:   /72 (Patient Site: Right Arm, Patient Position: Sitting, Cuff Size: Adult Large)   Wt 200 lb 1.6 oz (90.8 kg)   BMI 28.14 kg/m      General Appearance:  Normal  Head:  Normal  Ears: Normal  Eyes:  Normal  Nose:  Normal  Throat:  Normal  Neck:  Normal  Back:  Normal  Chest/Breast:Normal  Lungs:  Normal  Heart:  Normal  Abdomen:  Normal  Musculoskeletal:  Normal  Lymphatic:  Normal  Skin/Hair/Nails:  Normal  Neurologic:  Normal  Extremities:  Normal  Genitourinary: Normal  Pulses:  Normal     I have had an Advance Directives discussion with the patient.      This note has been dictated using voice  recognition software. Any grammatical or context distortions are unintentional and inherent to the the software.

## 2021-06-16 PROBLEM — G54.2 CERVICAL NERVE ROOT COMPRESSION: Status: ACTIVE | Noted: 2018-04-27

## 2021-06-17 NOTE — PROGRESS NOTES
Assessment:     1. Bronchitis  XR Chest 2 Views    predniSONE (DELTASONE) 10 mg tablet    cefTRIAXone (ROCEPHIN) 1 gram injection   2. Wheezing  XR Chest 2 Views    predniSONE (DELTASONE) 10 mg tablet    cefTRIAXone (ROCEPHIN) 1 gram injection       Plan:     1. Bronchitis  Chest x-ray will be reviewed by radiology; patient will hydrate himself.  Will begin a prednisone burst and taper for the wheezing; patient is finishing a Z-Coleman we will give him an injection of Rocephin which is always helped him in the past  - XR Chest 2 Views  - predniSONE (DELTASONE) 10 mg tablet; Take 1 tablet (10 mg total) by mouth daily. 4 tab daily x 2 days; 3 tab daily x 2 days; 2 tab daily x 2 days;one tab daily x 2 days  Dispense: 20 tablet; Refill: 0  - cefTRIAXone (ROCEPHIN) 1 gram injection; Inject 1 g into the shoulder, thigh, or buttocks daily.  Dispense: 1 each; Refill: 0    2. Wheezing  See note as per above  - XR Chest 2 Views  - predniSONE (DELTASONE) 10 mg tablet; Take 1 tablet (10 mg total) by mouth daily. 4 tab daily x 2 days; 3 tab daily x 2 days; 2 tab daily x 2 days;one tab daily x 2 days  Dispense: 20 tablet; Refill: 0  - cefTRIAXone (ROCEPHIN) 1 gram injection; Inject 1 g into the shoulder, thigh, or buttocks daily.  Dispense: 1 each; Refill: 0      Subjective:   Zeeshan is been wheezing and coughing is been getting worse since his preoperative exam prior to his next surgery which is now been postponed to May 15, 2018.  Patient began wheezing coughing over the weekend and clinically has worsened I did start him on a Z-Coleman on Friday which he is now finishing.  Patient is always improved with Rocephin injection.  We clinically have decided to give the patient a one-time dose of this.  He will finish his Z-Coleman I have started him on a prednisone taper for his wheezing his sinuses are mildly tender here TMs were normal oropharynx was clear all medical questions that were asked were answered I personally reviewed family social  "history surgeries allergies problems list patient will call us if he fails to improve by the end of the weekend we will review  The films with radiology  Review of Systems: A complete 14 point review of systems was obtained and is negative or as stated in the history of present illness.    Past Medical History:   Diagnosis Date     Arthritis      Diverticulitis      GERD (gastroesophageal reflux disease)      Family History   Problem Relation Age of Onset     Dementia Father      Colon cancer Sister      Anesthesia problems Neg Hx      Past Surgical History:   Procedure Laterality Date     APPENDECTOMY       fistula reemoval intestines       FRACTURE SURGERY       JOINT REPLACEMENT Right knee     PICC  8/13/2015          MO REMOVAL PREPATELLA BURSA      Description: Excision Of Prepatellar Bursa;  Recorded: 05/12/2014;     RECTAL PROLAPSE REPAIR N/A 8/6/2015    Procedure: SIGMOID RESECTION;  Surgeon: Juan Ramon Carrion MD;  Location: [facility];  Service:      Social History   Substance Use Topics     Smoking status: Former Smoker     Quit date: 1/1/1994     Smokeless tobacco: Never Used      Comment: quit 2002     Alcohol use Yes      Comment: occasionally          Objective:   /68  Pulse 84  Ht 5' 11\" (1.803 m)  Wt 196 lb 14.4 oz (89.3 kg)  BMI 27.46 kg/m2    General Appearance:  Normal  Head:  Normal  Ears: Normal  Eyes:  Normal  Nose:  Normal  Throat:  Normal  Neck:  Normal  Back:  Normal  Chest/Breast:Normal  Lungs: Wheezes expiratory are heard throughout chest no rales or rhonchi however chest x-ray will be reviewed by radiology  Heart:  Normal  Abdomen:  Normal  Musculoskeletal:  Normal  Lymphatic:  Normal  Skin/Hair/Nails:  Normal  Neurologic:  Normal  Extremities:  Normal  Genitourinary: Normal  Pulses:  Normal           This note has been dictated using voice recognition software. Any grammatical or context distortions are unintentional and inherent to the the software.   "

## 2021-06-17 NOTE — PROGRESS NOTES
Preoperative Exam    Scheduled Procedure: Right cervical 6-7,cervical 7-thoracic 1-2. Hemilaminotomy,, Medial facetectomy and foraminototmy  Surgery Date:  05/15/2018  Surgery Location: [facility], fax [phone]    Surgeon:  Dr. Victor    Assessment/Plan:     1. Health care maintenance  Immunizations will be updated today  - Pneumococcal polysaccharide vaccine 23-valent 3 yo or older, subq/IM  - Tdap vaccine,  8yo or older,  IM    2. Bronchitis  Patient is in the midst of a corticosteroid taper and has improved clinically 50-60%; surgery however will be canceled for next week and rescheduled for the following week patient should be 100% recovered by them    Surgical Procedure Risk: Low (reported cardiac risk generally < 1%)  Have you had prior anesthesia?: Yes  Have you or any family members had a previous anesthesia reaction:  No  Do you or any family members have a history of a clotting or bleeding disorder?: No  Cardiac Risk Assessment: no increased risk for major cardiac complications    Patient approved for surgery with general or local anesthesia.        Functional Status: Independent  Patient plans to recover at home with family.     Subjective:      Nando Carreno is a 68 y.o. male who presents for a preoperative consultation.  Patient is being seen as an interim examination because of bronchitis bronchospasm and wheezing.  Patient was started on corticosteroid taper after a course of azithromycin.  Patient is 60% recovered from bronchitis bronchospasm.  Patient should cancel his surgery for next week however I feel he will be 100% acceptable for surgery the following week.  Please see previous notes for surgical clearance.  Once again the patient has been approved for the anticipated surgery.  If neurosurgery would like him to be examined the day before surgery will be happy to do so once again.  But he has improved dramatically    All other systems reviewed and are negative, other than those  listed in the HPI.    Pertinent History  Do you have difficulty breathing or chest pain after walking up a flight of stairs: No  History of obstructive sleep apnea: No  Steroid use in the last 6 months: Yes: Predinsone for cough  Frequent Aspirin/NSAID use: No  Prior Blood Transfusion: No  Prior Blood Transfusion Reaction: No  If for some reason prior to, during or after the procedure, if it is medically indicated, would you be willing to have a blood transfusion?:  There is no transfusion refusal.    Current Outpatient Prescriptions   Medication Sig Dispense Refill     amoxicillin (AMOXIL) 500 MG capsule TK 4 CS PO 1 HOUR BEFORE DENTAL VISIT  2     azithromycin (ZITHROMAX) 250 MG tablet Take 2 tablets on day 1, then 1 tablet for 4 days. 6 tablet 0     b complex vitamins tablet Take 1 tablet by mouth daily.       gabapentin (NEURONTIN) 100 MG capsule 1 cap at bedtime x 3 days, then may take 1 cap twice daily x 3 days, then may take 1 cap three times daily 90 capsule 0     glucosamine-chondroitin 500-400 mg cap Take 1 capsule by mouth 2 (two) times a day.       ibuprofen (ADVIL,MOTRIN) 800 MG tablet        omeprazole (PRILOSEC) 20 MG capsule Take 20 mg by mouth as needed.        pravastatin (PRAVACHOL) 10 MG tablet TAKE 1 TABLET BY MOUTH EVERY OTHER DAY AT BEDTIME 45 tablet 0     predniSONE (DELTASONE) 10 mg tablet Take 1 tablet (10 mg total) by mouth daily. 4 tab daily x 2 days; 3 tab daily x 2 days; 2 tab daily x 2 days;one tab daily x 2 days 20 tablet 0     No current facility-administered medications for this visit.         Allergies   Allergen Reactions     Statins-Hmg-Coa Reductase Inhibitors Myalgia       Patient Active Problem List   Diagnosis     Intermittent Asthma     Overweight     Hyperlipemia     Esophageal reflux     Influenza     Benign Essential Hypertension     Enthesopathy Of The Left Knee     Diverticulosis     Diverticulitis Of Colon     Arthralgia     Polyarthropathy     Wrist arthritis      Shoulder impingement     Inflammatory arthritis     Diverticulitis with perforation     Diverticula, colon     Melanoma     Cervical nerve root compression       Past Medical History:   Diagnosis Date     Arthritis      Diverticulitis      GERD (gastroesophageal reflux disease)        Past Surgical History:   Procedure Laterality Date     APPENDECTOMY       fistula reemoval intestines       FRACTURE SURGERY       JOINT REPLACEMENT Right knee     [facility]  8/13/2015          CO REMOVAL PREPATELLA BURSA      Description: Excision Of Prepatellar Bursa;  Recorded: 05/12/2014;     RECTAL PROLAPSE REPAIR N/A 8/6/2015    Procedure: SIGMOID RESECTION;  Surgeon: Juan Ramon Carrion MD;  Location: [facility] OR;  Service:        Social History     Social History     Marital status:      Spouse name: N/A     Number of children: N/A     Years of education: N/A     Occupational History     Not on file.     Social History Main Topics     Smoking status: Former Smoker     Quit date: 1/1/1994     Smokeless tobacco: Never Used      Comment: quit 2002     Alcohol use Yes      Comment: occasionally      Drug use: No     Sexual activity: Not on file     Other Topics Concern     Not on file     Social History Narrative       Patient Care Team:  Misael Costa MD as PCP - General  Ingrid S Khazaeli, PharmD as Pharmacist (Pharmacist)          Objective:     There were no vitals filed for this visit.      Physical Exam:  General Appearance:  Alert, cooperative, no distress  Head:  Normocephalic, no obvious abnormality  Ears: TM anatomy normal  Eyes:  PERRL, EOM's intact, conjunctiva and corneas clear  Nose:  Nares symmetrical, septum midline, mucosa pink, no sinus tenderness  Throat:  Lips, tongue, and mucosa are moist, pink, and intact  Neck:  Supple, symmetrical, trachea midline, no adenopathy; thyroid: no enlargement, symmetric,no tenderness/mass/nodules; no carotid bruit, no JVD  Back:  Symmetrical, no curvature, ROM normal, no  CVA tenderness  Chest/Breast:  No mass or tenderness  Lungs: Patient still has expiratory wheezes but markedly improved from 4 days ago continue corticosteroid taper  Heart:  Normal PMI, regular rate & rhythm, S1 and S2 normal, no murmurs, rubs, or gallops  Abdomen:  Soft, non-tender, bowel sounds active all four quadrants, no mass, or organomegaly  Musculoskeletal:  Tone and strength strong and symmetrical, all extremities  Lymphatic:  No adenopathy  Skin/Hair/Nails:  Skin warm, dry, and intact, no rashes  Neurologic:  Alert and oriented x3, no cranial nerve deficits, normal strength and tone, gait steady  Extremities:  No edema.  Edwin's sign negative.    Genitourinary: deferred  Pulses:  Equal bilaterally    There are no Patient Instructions on file for this visit.    EKG: Normal    Labs:  No labs were ordered during this visit    Immunization History   Administered Date(s) Administered     Influenza high dose, seasonal 11/10/2015, 10/19/2016, 09/25/2017     Influenza, seasonal,quad inj 6-35 mos 09/29/2014     Pneumo Conj 13-V (2010&after) 10/19/2016           Electronically signed by Misael Costa MD 05/11/18 7:11 AM

## 2021-06-17 NOTE — PROGRESS NOTES
NEUROSURGERY CONSULTATION NOTE:    Assessment:    1. Postoperative infection, sequela  mupirocin (BACTROBAN NASAL) 2 % nasal ointment   2. Cervical nerve root compression           Plan: He is profoundly weak in the right upper extremity.  He has a positive EMG.  I have recommended surgical decompression of the nerve roots involved from a posterior approach.  I recommended a right C6-7, C7-T1 and T1-2 hemilaminotomy, medial facetectomy and foraminotomies.  I explained the risk of infection, bleeding and persistent nerve deficit or worsening.  He has a history of postoperative infections, so I will give him Bactroban nasal for 5 days prior to surgery.  Fortunately, I will not be putting any metal in his neck, so if he has an infection it will be less severe.  He and his wife appeared to understand the discussion.  They asked appropriate questions.  I think I answered to their satisfaction.  Total time spent in this evaluation of information, personal interpretation of imaging, documentation, coordination of care and face-to-face discussion was 45 minutes.    Nando Carreno   [address] [place] [id number]  67 y.o. male is sent to me in consultation   by Misael Costa MD     CC:    Chief Complaint   Patient presents with     Neck Pain       HPI:  Neurosurgery consultation was requested by: Dr. Luis A Teixeira  Pain: Neck pain   Radicular Pain is present: Left shoulder and shoulder blade. And in lateral side of right forearm  Lhermitte sign: No   Motor complaints:  Weakness in right arm and hand   Sensory complaints: Numbness in right hand and arm   Gait and balance issues: balance issues   Bowel or bladder issues: Denies   Duration of SX is: Over 1 year   The symptoms are worse with: Turning head to left   The symptoms are better with: nothing   Injury: Broke neck in 2000  Severity is: Mild - moerate  Patient has tried the following conservative measures: He had two injections and had good relief for awhile.      PROBLEM LIST:  1. Postoperative infection, sequela  mupirocin (BACTROBAN NASAL) 2 % nasal ointment   2. Cervical nerve root compression            REVIEW OF SYSTEMS:  A 12 point review of systems is positive for diverticulitis.  He has had surgery for this.  He has a history of arthritis.  He has a history of infections after surgery.  The neurological symptoms are listed above.  Otherwise, the review is negative.      Past Medical History:   Diagnosis Date     Arthritis      Diverticulitis      GERD (gastroesophageal reflux disease)          Past Surgical History:   Procedure Laterality Date     APPENDECTOMY       fistula reemoval intestines       FRACTURE SURGERY       JOINT REPLACEMENT Right knee     [facility]  8/13/2015          UT REMOVAL PREPATELLA BURSA      Description: Excision Of Prepatellar Bursa;  Recorded: 05/12/2014;     RECTAL PROLAPSE REPAIR N/A 8/6/2015    Procedure: SIGMOID RESECTION;  Surgeon: Juan Ramon Carrion MD;  Location: [facility];  Service:         Laminoplasty, C4 through 6      MEDICATIONS:  Current Outpatient Prescriptions   Medication Sig Dispense Refill     amoxicillin (AMOXIL) 500 MG capsule TK 4 CS PO 1 HOUR BEFORE DENTAL VISIT  2     b complex vitamins tablet Take 1 tablet by mouth daily.       gabapentin (NEURONTIN) 100 MG capsule 1 cap at bedtime x 3 days, then may take 1 cap twice daily x 3 days, then may take 1 cap three times daily 90 capsule 0     glucosamine-chondroitin 500-400 mg cap Take 1 capsule by mouth 2 (two) times a day.       ibuprofen (ADVIL,MOTRIN) 800 MG tablet        omeprazole (PRILOSEC) 20 MG capsule Take 20 mg by mouth as needed.        pravastatin (PRAVACHOL) 10 MG tablet TAKE 1 TABLET BY MOUTH EVERY OTHER DAY AT BEDTIME 45 tablet 0     mupirocin (BACTROBAN NASAL) 2 % nasal ointment Use one-half of tube in each nostril twice daily for five (5) days. After application, press sides of nose together and gently massage. 10 g 0     No current  "facility-administered medications for this visit.          ALLERGIES/SENSITIVITIES:     Allergies   Allergen Reactions     Statins-Hmg-Coa Reductase Inhibitors Myalgia       PERTINENT SOCIAL HISTORY:   Social History     Social History     Marital status:      Spouse name: N/A     Number of children: N/A     Years of education: N/A     Social History Main Topics     Smoking status: Former Smoker     Quit date: 1/1/1994     Smokeless tobacco: Never Used      Comment: quit 2002     Alcohol use Yes      Comment: occasionally      Drug use: No     Sexual activity: Not Asked       FAMILY HISTORY:  Family History   Problem Relation Age of Onset     Dementia Father      Colon cancer Sister      Anesthesia problems Neg Hx         PHYSICAL EXAM:   Constitutional: /86  Pulse 86  Ht 5' 10.25\" (1.784 m)  Wt 195 lb (88.5 kg)  BMI 27.78 kg/m2    General appearance: Appropriately groomed.  No acute distress.  Interactive.     Mental Status: Mental status: Alert and oriented, mood and affect appropriate, language reception and expression normal, recent and remote memory is normal, higher cortical function normal. Attention span, concentration and ability to follow commands is normal.       Cranial Nerves: Face is symmetric.  Extraocular movements are full, conjugate and without nystagmus.  Hearing is preserved.  Shoulder position is symmetric.  Tongue is midline with normal motion.       Motor: Weak right .  Weakness of the right triceps wrist extensors and intrinsic muscles of the hand.  All of these muscle groups are 4+/5.  Tone nl, bulk nl and strength 5/5 all left groups.      Sensory: Sensory exam by subjective report intact to LT,PP,Position and Vib. in the UE and  LE, with the exception of alteration in the hand along C7, C8.     Station and Gait:  Station and Gait- nl stride length,  balance and stephanie..     Reflexes; he has residual hyperreflexia related to his prior laminoplasty and cord compression. "  Negative for [place]'s.    IMAGING:  I have personally reviewed the images and discussed the findings with Nando Carreno.  Prior laminoplasty changes are noted from C4 through C6.  He has significant DDD below this level.  This results in severe foraminal stenosis at C6-7, C7-T1 and T1-2.  The foraminal stenosis is most on the right.    CC:     Luis A Teixeira, [id number] [place] [address]

## 2021-06-17 NOTE — PROGRESS NOTES
Patient presents for right upper extremity EMG.  He has cervical spine pain with right arm weakness numbness and tingling in the right hand also some stable left-sided neck pain after a fall.  He is right-handed.    EMG/NCS  results: Please see scanned full report    Comment NCS: Abnormal study  1.  Low amplitude right median and ulnar CMAPs.  Remainder  2.  Right upper extremity nerve conduction studies normal.    Comment EMG: Abnormal study  1.  Increased insertional activity with large MUP potentials in multiple C8-T1 innervated muscles.  Also large motor unit duration right tricep.    Interpretation: Abnormal study    1.  Right C8 and/or T1 radiculopathy, active and chronic.    2.  Large motor unit potentials of the right tricep.  Cannot exclude a concomitant chronic and inactive right C7 radiculopathy.      3. There is no electrodiagnostic evidence of focal neuropathy in the right upper extremity.    Note: We did discuss his case further review plain film images.  Please see addendum to today's clinic note for full details.    The testing was completed in its entirety by the physician.       It was our pleasure caring for your patient today, if there any questions or concerns please do not hesitate to contact us.

## 2021-06-17 NOTE — PROGRESS NOTES
Neurosurgery consultation was requested by: Dr. Luis A Teixeira  Pain: Neck pain   Radicular Pain is present: Left shoulder and shoulder blade. And in lateral side of right forearm  Lhermitte sign: No   Motor complaints:  Weakness in right arm and hand   Sensory complaints: Numbness in right hand and arm   Gait and balance issues: balance issues   Bowel or bladder issues: Denies   Duration of SX is: Over 1 year   The symptoms are worse with: Turning head to left   The symptoms are better with: nothing   Injury: Broke neck in 2000  Severity is: Mild - moerate  Patient has tried the following conservative measures: He had two injections and had good relief for awhile.   NDI score is :   JShowLIZZETH rodas

## 2021-06-17 NOTE — PROGRESS NOTES
Assessment/Plan:      Diagnoses and all orders for this visit:    Neural foraminal stenosis of cervical spine  -     gabapentin (NEURONTIN) 100 MG capsule; 1 cap at bedtime x 3 days, then may take 1 cap twice daily x 3 days, then may take 1 cap three times daily  Dispense: 90 capsule; Refill: 0    Cervical radicular pain  -     EMG - Clinic; Future; Expected date: 4/18/18  -     XR Cervical Spine 2 - 3 VWS; Future; Expected date: 4/18/18    Cervicalgia  -     gabapentin (NEURONTIN) 100 MG capsule; 1 cap at bedtime x 3 days, then may take 1 cap twice daily x 3 days, then may take 1 cap three times daily  Dispense: 90 capsule; Refill: 0    Myofascial muscle pain  -     gabapentin (NEURONTIN) 100 MG capsule; 1 cap at bedtime x 3 days, then may take 1 cap twice daily x 3 days, then may take 1 cap three times daily  Dispense: 90 capsule; Refill: 0    Hand paresthesia  -     EMG - Clinic; Future; Expected date: 4/18/18        Assessment: Pleasant 67-year-old gentleman with a history of reflux with:    1. Significantly progressive cervical spine pain with right arm pain and paresthesias with weakness in the .  Has had C8-T1 radiculopathy and EMG over one year ago.  Significantly worsened pain over the past 4 months since being in [place] has improved some with epidural T1-2 interlaminar in [place] but still having significant weakness.  He does have foraminal stenosis on the right  New MRI.  This is at C6-7 and C7-T1  2.  Significant flare of left-sided cervical spine pain in the parascapular region and left cervical spine after a fall 3 days ago.  Most consistent with facet arthropathy myofascial pain cervical strain.  Significant facet arthropathy on the left in the mid cervical spine on recent MRI.  3.  Myofascial pain left parascapular region.  4.  Right hand paresthesias likely related to cervical radicular pain cannot exclude ulnar neuropathy at the elbow although no focal neuropathy seen in EMG over one year  ago.      Discussion:    1.  We discussed the diagnosis and treatment options.  With ongoing weakness of the right hand I am concerned about new active radiculopathy.  We also discussed the MRI as well as new pain in the cervical spine after fall.  2.  He is having some dizziness and grogginess with gabapentin.  Will provide 100 mg capsules and he can take 100 mg's in the morning and afternoon and continue to take 300 mg at bedtime.  3.  We will obtain x-rays cervical spine today to evaluate for any new fracture.  4.  EMG right upper extremity done to further evaluate    Addendum: Further evaluation of the patient was done after cervical spine plain films and EMG were completed:    I did not appreciate any fractures on plain films of the cervical spine with significant degenerative disc disease mid cervical spine withOsteophyte formation and potential autofusion at C5-6.  Significant disc height loss at C6-7.  EMG showed active and chronic right C8 and/or T1 radiculopathy and potential chronic right C7 radiculopathy.    Results were discussed with the patient.  1.  At this point I recommend neurosurgical evaluation given the extent of the denervation of the right arm and weakness of the right hand.  Will refer to Dr. Victor for evaluation.  He agrees.  2.  We will try icing the left side of his neck and monitor symptoms.  If worse he will contact us.  3.  We will cut down on the gabapentin as this is causing drowsiness.  This plan is detailed above.  4.  Follow-up with me after seeing neurosurgery if needed.    40 minutes were spent with this patient in addition to any procedure with greater than 50% in counseling and coordination of care.      It was our pleasure caring for your patient today, if there any questions or concerns please do not hesitate to contact us.      Subjective:   Patient ID: Nando Carreno is a 67 y.o. male.    History of Present Illness: Patient presents for evaluation of cervical spine pain  right arm pain and paresthesias.  It is been over one year since his last visit.  Was doing quite well and went to [place] in January of this year.  Right after driving to [place] he began having severe neck pain and right arm pain.  He was doing some work on a car pounding with a hammer on some metal work and but is unsure what exactly caused the pain.  He was having severe pain all winter while in [place] and was seen by numerous providers and eventually seen by [facility].  Underwent T1-2 interlaminar epidural steroid injection on March 26 records were reviewed.  Tells me this did help the pain but is still having some issues with weakness and some paresthesias.  He is also been on gabapentin 300 mg 4 times daily and feels quite tired and dizzy with this.  So dizzy in fact that he did slip and fall in his garage 3 days ago.    His right-sided neck pain is improved he has numbness and tingling right hand mostly digits 4 and 5 and weakness in .  Pain is an 8/10 today an 8/10 at worst.  Worse with any turning his head.    He did fall landing on his left shoulder 3 days ago no loss of consciousness.  He has significant pain in the left-sided neck parascapular region upper trapezius.  Worse with turning his head but no paresthesias in the left arm.  He has had some poor balance and dizzy issues.    In the past he has had improvement in his neck pain with gabapentin and did have an EMG one year ago that showed C8-T1 radiculopathy on the right had been doing quite well until recently.      Imaging: MRI cervical spine recently performed in [place] personally reviewed no report available.  This shows multilevel degenerative disc disease with potential autofusion C5-6 prior laminoplasty.  There is right greater than left foraminal stenosis C6-7 and C7-T1.  I would classify this as marketed at C7-T1 and slightly less at C6-7.    Review of Systems: Complains of numbness and tingling right arm with weakness.  His  dizziness and poor balance.  No bowel or bladder incontinence headache nausea vomiting blurred vision.    Past Medical History:   Diagnosis Date     Arthritis      Diverticulitis      GERD (gastroesophageal reflux disease)        The following portions of the patient's history were reviewed and updated as appropriate: allergies, current medications, past family history, past medical history, past social history, past surgical history and problem list.      Objective:   Physical Exam:    Vitals:    04/18/18 0858   BP: 116/69   Pulse: 89       General: Alert and oriented with normal affect. Attention, knowledge, memory, and language are intact. No acute distress.   Eyes: Sclerae are clear.  Respirations: Unlabored.     Gait:  Nonantalgic  Positive Tinel's right wrist and elbow.  Tenderness palpation left cervical paraspinals mid cervical spine left parascapular region upper trapezius  Sensation is intact to light touch throughout the upper extremities.  Reflexes are 2+ and symmetric in the biceps triceps and brachioradialis with negative Hoffmans.      Manual muscle testing reveals:  Right /Left out of 5  5/5 shoulder abductors  5/5 elbow flexors  5/5 elbow extensors  5/5 wrist extensors  5/5 interosseus  4/5 finger flexors

## 2021-06-17 NOTE — PROGRESS NOTES
Preoperative Exam    Scheduled Procedure: Right cervical 6-7,cervical 7-thoracic 1-2. Hemilaminotomy,, Medial facetectomy and foraminototmy  Surgery Date:  5-8-18  Surgery Location: [facility], fax [phone]    Surgeon:  Dr JAMEY Victor    Assessment/Plan:     1. Preop examination  Workup to include  - HM1(CBC and Differential)  - Potassium  - Electrocardiogram Perform - Clinic  - INR  - HM1 (CBC with Diff)    2. Cervical radicular pain  Workup to include  - HM1(CBC and Differential)  - Potassium  - Electrocardiogram Perform - Clinic  - INR  - HM1 (CBC with Diff)     Surgical Procedure Risk: Low (reported cardiac risk generally < 1%)  Have you had prior anesthesia?: Yes  Have you or any family members had a previous anesthesia reaction:  No  Do you or any family members have a history of a clotting or bleeding disorder?: No  Cardiac Risk Assessment: no increased risk for major cardiac complications    Patient approved for surgery with general or local anesthesia.        Functional Status: Independent  Patient plans to recover at home with family.     Subjective:      Nando Carreno is a 67 y.o. male who presents for a preoperative consultation.  Patient has been experiencing numbness and loss of function in his right upper extremity intermittently for over a year now.  The patient did have treatment with gabapentin initially with relief of symptoms approximately 1 year ago.  The patient went to [place] and developed increasing symptoms of pain loss of function paresthesia especially loss of hand strength.  He saw a spine specialist out in [place] and underwent a series of epidural injections with temporary relief only.  Patient returned here after wintering in [place] and saw neurosurgery and patient has osteoarthritis causing compression on the facets and his cervical spine.  There is no evidence of stenosis however.  Patient is scheduled for foraminotomies to suction and relief of these symptoms.  Patient  has no recent history of angina chronic kidney disease stroke or invasion of any body cavities and therefore is satisfies the Wray criteria for preoperative clearance.  He denies any visual disturbances hearing loss dysphagia disturbance of the cranial nerves shortness of breath dyspnea chest pain angina abdominal pain diarrhea constipation urgency frequency dysuria.  Once again the patient has been approved for the anticipated surgery    All other systems reviewed and are negative, other than those listed in the HPI.    Pertinent History  Do you have difficulty breathing or chest pain after walking up a flight of stairs: No  History of obstructive sleep apnea: No  Steroid use in the last 6 months: Yes: cortisone injection  Frequent Aspirin/NSAID use: No  Prior Blood Transfusion: No  Prior Blood Transfusion Reaction: No  If for some reason prior to, during or after the procedure, if it is medically indicated, would you be willing to have a blood transfusion?:  There is no transfusion refusal.    Current Outpatient Prescriptions   Medication Sig Dispense Refill     amoxicillin (AMOXIL) 500 MG capsule TK 4 CS PO 1 HOUR BEFORE DENTAL VISIT  2     b complex vitamins tablet Take 1 tablet by mouth daily.       gabapentin (NEURONTIN) 100 MG capsule 1 cap at bedtime x 3 days, then may take 1 cap twice daily x 3 days, then may take 1 cap three times daily 90 capsule 0     glucosamine-chondroitin 500-400 mg cap Take 1 capsule by mouth 2 (two) times a day.       mupirocin (BACTROBAN NASAL) 2 % nasal ointment Use one-half of tube in each nostril twice daily for five (5) days. After application, press sides of nose together and gently massage. 10 g 0     omeprazole (PRILOSEC) 20 MG capsule Take 20 mg by mouth as needed.        pravastatin (PRAVACHOL) 10 MG tablet TAKE 1 TABLET BY MOUTH EVERY OTHER DAY AT BEDTIME 45 tablet 0     ibuprofen (ADVIL,MOTRIN) 800 MG tablet        No current facility-administered medications for this  visit.         Allergies   Allergen Reactions     Statins-Hmg-Coa Reductase Inhibitors Myalgia       Patient Active Problem List   Diagnosis     Intermittent Asthma     Overweight     Hyperlipemia     Esophageal reflux     Influenza     Benign Essential Hypertension     Enthesopathy Of The Left Knee     Diverticulosis     Diverticulitis Of Colon     Arthralgia     Polyarthropathy     Wrist arthritis     Shoulder impingement     Inflammatory arthritis     Diverticulitis with perforation     Diverticula, colon     Melanoma     Cervical nerve root compression       Past Medical History:   Diagnosis Date     Arthritis      Diverticulitis      GERD (gastroesophageal reflux disease)        Past Surgical History:   Procedure Laterality Date     APPENDECTOMY       fistula reemoval intestines       FRACTURE SURGERY       JOINT REPLACEMENT Right knee     PICC  8/13/2015          [place] REMOVAL PREPATELLA BURSA      Description: Excision Of Prepatellar Bursa;  Recorded: 05/12/2014;     RECTAL PROLAPSE REPAIR N/A 8/6/2015    Procedure: SIGMOID RESECTION;  Surgeon: Juan Ramon Carrion MD;  Location: [facility] OR;  Service:        Social History     Social History     Marital status:      Spouse name: N/A     Number of children: N/A     Years of education: N/A     Occupational History     Not on file.     Social History Main Topics     Smoking status: Former Smoker     Quit date: 1/1/1994     Smokeless tobacco: Never Used      Comment: quit 2002     Alcohol use Yes      Comment: occasionally      Drug use: No     Sexual activity: Not on file     Other Topics Concern     Not on file     Social History Narrative       Patient Care Team:  Misael Costa MD as PCP - General  Ingrid S Khazaeli, PharmD as Pharmacist (Pharmacist)          Objective:     There were no vitals filed for this visit.      Physical Exam:  General Appearance:  Alert, cooperative, no distress  Head:  Normocephalic, no obvious abnormality  Ears: TM  anatomy normal  Eyes:  PERRL, EOM's intact, conjunctiva and corneas clear  Nose:  Nares symmetrical, septum midline, mucosa pink, no sinus tenderness  Throat:  Lips, tongue, and mucosa are moist, pink, and intact  Neck:  Supple, symmetrical, trachea midline, no adenopathy; thyroid: no enlargement, symmetric,no tenderness/mass/nodules; no carotid bruit, no JVD  Back:  Symmetrical, no curvature, ROM normal, no CVA tenderness  Chest/Breast:  No mass or tenderness  Lungs:  Clear to auscultation bilaterally, respirations unlabored   Heart:  Normal PMI, regular rate & rhythm, S1 and S2 normal, no murmurs, rubs, or gallops  Abdomen:  Soft, non-tender, bowel sounds active all four quadrants, no mass, or organomegaly  Musculoskeletal:  Tone and strength strong and symmetrical, all extremities  Lymphatic:  No adenopathy  Skin/Hair/Nails:  Skin warm, dry, and intact, no rashes  Neurologic:  Alert and oriented x3, no cranial nerve deficits, normal strength and tone, gait steady  Extremities:  No edema.  Edwin's sign negative.    Genitourinary: deferred  Pulses:  Equal bilaterally    There are no Patient Instructions on file for this visit.    EKG: Normal EKG    Labs:  Labs pending at this time.  Results will be reviewed when available.    Immunization History   Administered Date(s) Administered     Influenza high dose, seasonal 11/10/2015, 10/19/2016, 09/25/2017     Influenza, seasonal,quad inj 6-35 mos 09/29/2014     Pneumo Conj 13-V (2010&after) 10/19/2016           Electronically signed by Misael Costa MD 05/02/18 10:56 AM

## 2021-06-18 NOTE — ANESTHESIA PREPROCEDURE EVALUATION
Anesthesia Evaluation      Patient summary reviewed   No history of anesthetic complications     Airway   Mallampati: II  Neck ROM: full   Pulmonary - normal exam    breath sounds clear to auscultation  (+) a smoker  (-) sleep apnea    ROS comment: Bronchitis                         Cardiovascular   Exercise tolerance: > or = 4 METS  (+) hypertension, angina, , hypercholesterolemia,     (-) murmur  ECG reviewed  Rhythm: regular  Rate: normal,    no murmur      Neuro/Psych    (+) neuromuscular disease,      Endo/Other    (+) arthritis, obesity,   (-) no diabetes, hypothyroidism, hyperthyroidism     GI/Hepatic/Renal    (+) GERD well controlled,             Dental - normal exam                        Anesthesia Plan  Planned anesthetic: general endotracheal    ASA 2     Anesthetic plan and risks discussed with: patient and spouse  Anesthesia plan special considerations: antiemetics,   Post-op plan: routine recovery

## 2021-06-18 NOTE — PROGRESS NOTES
"CHART NOTE     DATE OF SERVICE:  2018     : 1950   68 y.o.     ASSESSMENT :   Doing well with improvement in symptoms and function.       PLAN:  Loosen restrictions. Refer to PT. RTC prn. Enc to call with any new questions or concerns.     HPI:  Nando Carreno is status post RIGHT C 6-7, C 7- T 1, T 1-2 HEMILAMINOTOMY MEDIAL FACETECTOMY ANF FORAMINOTOMY on 18 by Dr. Victor. Presented with \"ice pick pain\" in right shoulder blade with right hand weakness and pain in forearm. S/P Laminoplasty with Dr. Griffiths in .    TODAY, Nando Carreno reports \"ice pick\" is gone.  Residual N/T, occasional cramping in hand.  Walking with good michelle.  Retired.      PAST MEDICAL HISTORY, SURGICAL HISTORY, REVIEW OF SYMPTOMS, MEDICATIONS AND ALLERGIES:  Past medical history, surgical history, ROS, medications and allergies reviewed with patient and remain unchanged from previous visit.    Past Medical History:   Diagnosis Date     Arthritis      Cancer (H)     melanoma excised from right cheek     Cervical nerve root compression      Diverticulitis      GERD (gastroesophageal reflux disease)      Hyperlipidemia        PHYSICAL EXAM:    BP (!) 138/31  Pulse 90  Temp 97.3  F (36.3  C)  Ht 5' 11\" (1.803 m)  Wt 195 lb (88.5 kg)  SpO2 92%  BMI 27.2 kg/m2      Neurological exam reveals:  Respirations easy, non-labored.   Skin: W/D/I. No rashes, lesions or breaks in integrity.   Recent and remote memory intact, fund of knowledge wnl.    Alert and oriented x3, speech fluent and appropriate.   PERRL, EOMI, No nystagmus,   Face symmetric, tongue midline, Uvula midline,  palate rises with phonation   Shoulder shrug equal  Arm strength bilateral grasp, biceps, triceps, and deltoids 5/5   No extremity edema noted.   Can heel/toe walk, do toe rises and squats without difficulty.   Muscle Bulk and tone wnl.   Reflexes: No pathological reflexes   Gait and station:Normal  Incision: CDI, erythema surrounding incision, well " demarcated c/w staple irritation. Healed laminoplasty scar noted.   NDI/KRANTHI: 32%     RADIOGRAPHIC IMAGING: NA

## 2021-06-18 NOTE — PROGRESS NOTES
"Pt is here for staple and suture removal s/p RIGHT CERVICAL 6-7, CERVICAL 7- THORACIC 1, THORACIC 1-2 HEMILAMINOTOMY MEDIAL FACETECTOMY ANF FORAMINOTOMY on 5-22-18 by Dr. Victor.   Before surgery he reports he had \"ice pick pain\" in right shoulder blade with right hand weakness and pain in forearm.  Today, he reports the ice pick pain is gone, but the hand and arm are about the same.  He is taking ES tylenol 1-2 times per day for incisional discomfort.      Surgical wound scabby - CDI, no signs of infection or skin breakdown.  Incision healing well-: good skin approximation, mild redness from staple/suture irritation.no tenderness to palpation.  PT. AF, denies fever, chills or sweats.  Pt. reports that the symptoms are some improved from pre-op.    Staples - intact removed without difficulty. Wound prepped with Betadine before and after removal.  Surrounding skin has no signs of breakdown.    Suture removal:- sutures intact removed without difficulty. Wound prepped with Betadine before and after removal.  Surrounding skin has no signs of breakdown.  Verbal instructions regarding incision care are given.  Pt. advised to call us if any s/s of infection noted - all discussed in details.    Meredith Ramachandran RN  "

## 2021-06-18 NOTE — ANESTHESIA POSTPROCEDURE EVALUATION
Patient: Nando Carreno  RIGHT CERVICAL 6-7, CERVICAL 7- THORACIC 1, THORACIC 1-2 HEMILAMINOTOMY MEDIAL FACETECTOMY ANF FORAMINOTOMY  Anesthesia type: general    Patient location: PACU  Last vitals:   Vitals:    05/22/18 1400   BP: 124/74   Pulse: 83   Resp: 20   Temp:    SpO2: 96%     Post vital signs: stable  Level of consciousness: awake and responds to simple questions  Post-anesthesia pain: pain controlled  Post-anesthesia nausea and vomiting: no  Pulmonary: unassisted, return to baseline  Cardiovascular: stable and blood pressure at baseline  Hydration: adequate  Anesthetic events: no    QCDR Measures:  ASA# 11 - Sujata-op Cardiac Arrest: ASA11B - Patient did NOT experience unanticipated cardiac arrest  ASA# 12 - Sujata-op Mortality Rate: ASA12B - Patient did NOT die  ASA# 13 - PACU Re-Intubation Rate: ASA13B - Patient did NOT require a new airway mgmt  ASA# 10 - Composite Anes Safety: ASA10A - No serious adverse event    Additional Notes: Pt seen by Dr. Victor post op regarding tingling and numbness of ulnar distribution of right hand.

## 2021-06-18 NOTE — ANESTHESIA CARE TRANSFER NOTE
Last vitals:   Vitals:    05/22/18 1027   BP: 132/70   Pulse: 81   Resp: 10   Temp: 35.9  C (96.6  F)   SpO2: 100%     Patient's level of consciousness is drowsy  Spontaneous respirations: yes  Maintains airway independently: yes  Dentition unchanged: yes  Oropharynx: oropharynx clear of all foreign objects    QCDR Measures:  ASA# 20 - Surgical Safety Checklist: WHO surgical safety checklist completed prior to induction  PQRS# 430 - Adult PONV Prevention: 4558F - Pt received => 2 anti-emetic agents (different classes) preop & intraop  ASA# 8 - Peds PONV Prevention: NA - Not pediatric patient, not GA or 2 or more risk factors NOT present  PQRS# 424 - Sujata-op Temp Management: 4559F - At least one body temp DOCUMENTED => 35.5C or 95.9F within required timeframe  PQRS# 426 - PACU Transfer Protocol: - Transfer of care checklist used  ASA# 14 - Acute Post-op Pain: ASA14B - Patient did NOT experience pain >= 7 out of 10

## 2021-06-19 NOTE — PROGRESS NOTES
[facility] Daily Progress     Patient Name: Nando Carreno  Date: 7/24/2018  Visit #: 3  PTA visit #:  -  Referral Diagnosis: cervical nerve root compression  Referring provider: Hollie Johnson CNP  Visit Diagnosis:     ICD-10-CM    1. Cervical radiculitis M54.12    2. Myofascial pain M79.1    3. Cervical nerve root compression G54.2    4. Generalized muscle weakness M62.81    5. Thoracic spine pain M54.6        Past Medical History:   Diagnosis Date     Arthritis      Cancer (H)     melanoma excised from right cheek     Cervical nerve root compression      Diverticulitis      GERD (gastroesophageal reflux disease)      Hyperlipidemia          Assessment:   Patient was having increased pain between spine and shoulder blade, beginning last Friday. He isn't sure what caused the pain so he stopped all PT exercises. Patient comes in today very concerned about he pain and is wondering if he should go back to referring provider or Dr. Teixeira for this pain. We discussed that he may have had a flare up but there's no need to return to Dr. Teixeira or Hollie Johnson at this time, especially since 1) it's only been 4 days since the pain began 2) the pain is diminishing since Friday 3) the pain was nearly gone after therapy today. We discussed that if his pain gets worse or doesn't improve, then he will be referred back. It was recommended that he see Dr. Teixeira for the chronic pain on the left side of his neck after a fall last spring. He has shown significant progress with  strength on his R since last session.    Patient demonstrates understanding/independence with home program.  Patient is benefitting from skilled physical therapy and is making steady progress toward functional goals.  Patient is appropriate to continue with skilled physical therapy intervention, as indicated by initial plan of care.    Goal Status:  Pt. will demonstrate/verbalize independence in self-management of condition in : 6  "weeks  Pt. will be independent with home exercise program in : 6 weeks  Patient will decrease : NDI score;by _ points;for improved quality of function;for improved quality of life;in 6 weeks  by ___ points: 5  Pt will: have  strength within at least 10# of his left side in order to return to PLOF.  Pt will: be able to drive independently with less difficulty in order to improve his QOL and independence within 8 weeks.  Pt will: have negative neural tension testing in R UE within 8 weeks in order to return to improve  strength.    Plan / Patient Education:     Continue with initial plan of care.  Progress with home program as tolerated.     Exercises:  Exercise #1: median nerve glides- hands together  Comment #1: x20  Exercise #2: median nerve glides looking to the side  Comment #2: x20 on R  Exercise #3: ulnar nerve glides- hand on ear and butterfly  Comment #3: x20 on R  Exercise #4: scap sets  Comment #4: x10 w/ 5\" holds  Exercise #5: doorway pec stretch  Exercise #6: green foam exs  Exercise #7: rhomberg and 1/2 tandem B  Comment #7: 1'     Plan for next visit: how are exs and tennis ball massage going? sit to stand and TUG as needed, try balance on foam or tap ups, rows and shoulder ext    Subjective:     Pain Rating: \"very low\" 2/10    Started to get \"toothpick\" feeling into R shoulder on Friday, which is why he had the surgery to begin with. Very concerned about this and stopped doing all exercises since then. He is wondering if he should go back and see Dr. Teixeira or the surgeon. Strength continues to improve. L side of his neck is still bothering him.       Objective:   R  strength: 70, 65, 64#  L  strength: 95, 88#    Scar is closed.    Rhomberg: mild unsteadiness noted  1/2 tandem: mild unsteadiness noted    Gait: no AD    Positive ulnar neural tension testing on R  Positive median neural tension testing on R at 160 degrees    Significantly less pain after MT      Treatment Today   "   TREATMENT MINUTES COMMENTS   Evaluation     Self-care/ Home management     Manual therapy 10 MFR on R middle trap. Taught patient tennis ball massage.   Neuromuscular Re-education     Therapeutic Activity     Therapeutic Exercises 15 Discussed progress. Objective measures taken. Progressed HEP- see flow sheet for details. Educated patient on sometimes he may have better days than others; no need to panic at this time. If he gets worse or doesn't get better, then he should return to referring provider. Answered patient questions.    Gait training     Modality__________________                Total 26    Blank areas are intentional and mean the treatment did not include these items.       Ashutosh Lou, PT, DPT  7/24/2018
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
## 2021-06-19 NOTE — PROGRESS NOTES
Assessment:     1. Shoulder pain, left  XR Shoulder Left 2 or More VWS    diclofenac sodium (VOLTAREN) 1 % Gel   2. Contusion, shoulder and upper arm, multiple sites  diclofenac sodium (VOLTAREN) 1 % Gel       Plan:     1. Shoulder pain, left  Ice to area; patient may take Tylenol or ibuprofen; rub the following gel on 4 times daily treatment as above  - XR Shoulder Left 2 or More VWS; Future  - diclofenac sodium (VOLTAREN) 1 % Gel; Apply four times daily  Dispense: 100 g; Refill: 0    2. Contusion, shoulder and upper arm, multiple sites    - diclofenac sodium (VOLTAREN) 1 % Gel; Apply four times daily  Dispense: 100 g; Refill: 0      Subjective:   This patient was working in his garage on 1 of his cars when he fell space after tripping on a shop vac cord.  Patient took the fall mainly on his left shoulder.  Since that time he has had pain discomfort between the acromion of the shoulder and the insertion of the trapezius.  He has been icing the area.  Patient is getting physical therapy for his neck and for radicular pain on the right side therapist suggested he come in for examination.  Patient is tender mid trapezius there is moderate swelling of the area.  No point tenderness could be elicited range of motion shoulder normal no crepitus was felt x-rays look negative for fracture will review with radiology.  I suggest to continue with heat and I will prescribe Voltaren gel to apply 4 times daily.  He fails to respond we may do soft tissue imaging with MRI but he already has a physical therapy consultation in place.  Patient will let me know.  All medical questions that were asked were answered pleasure to see the patient again.    Review of Systems: A complete 14 point review of systems was obtained and is negative or as stated in the history of present illness.    Past Medical History:   Diagnosis Date     Arthritis      Cancer (H)     melanoma excised from right cheek     Cervical nerve root compression       Diverticulitis      GERD (gastroesophageal reflux disease)      Hyperlipidemia      Family History   Problem Relation Age of Onset     Dementia Father      Colon cancer Sister      Anesthesia problems Neg Hx      Past Surgical History:   Procedure Laterality Date     APPENDECTOMY       BACK SURGERY       CERVICAL LAMINECTOMY N/A 5/22/2018    Procedure: RIGHT CERVICAL 6-7, CERVICAL 7- THORACIC 1, THORACIC 1-2 HEMILAMINOTOMY MEDIAL FACETECTOMY ANF FORAMINOTOMY;  Surgeon: Brittany Victor MD;  Location: [facility] Main OR;  Service:      CERVICAL SPINE SURGERY  2000     COLON SURGERY  2015    sigmoid resection for diverticulitis     fistula reemoval intestines       FRACTURE SURGERY       JOINT REPLACEMENT Right     right TKA     LUMBAR SPINE SURGERY      X2     PICC  8/13/2015          NC REMOVAL PREPATELLA BURSA Left     Description: Excision Of Prepatellar Bursa;  Recorded: 05/12/2014;     rectal fistula repair      multiple     RECTAL PROLAPSE REPAIR N/A 8/6/2015    Procedure: SIGMOID RESECTION;  Surgeon: Juan Ramon Carrion MD;  Location: [facility] OR;  Service:      Social History   Substance Use Topics     Smoking status: Former Smoker     Quit date: 1/1/1994     Smokeless tobacco: Never Used     Alcohol use Yes      Comment: occasionally          Objective:   /80  Pulse 88  Wt 198 lb 6.4 oz (90 kg)  BMI 27.67 kg/m2    General Appearance:  Alert, cooperative, no distress  Head:  Normocephalic, no obvious abnormality  Ears: TM anatomy normal  Eyes:  PERRL, EOM's intact, conjunctiva and corneas clear  Nose:  Nares symmetrical, septum midline, mucosa pink, no sinus tenderness  Throat:  Lips, tongue, and mucosa are moist, pink, and intact  Neck:  Supple, symmetrical, trachea midline, no adenopathy; thyroid: no enlargement, symmetric,no tenderness/mass/nodules; no carotid bruit, no JVD  Back:  Symmetrical, no curvature, ROM normal, no CVA tenderness  Chest/Breast:  No mass or tenderness  Lungs:  Clear to  auscultation bilaterally, respirations unlabored   Heart:  Normal PMI, regular rate & rhythm, S1 and S2 normal, no murmurs, rubs, or gallops  Abdomen:  Soft, non-tender, bowel sounds active all four quadrants, no mass, or organomegaly  Musculoskeletal:  Tone and strength strong and symmetrical, all extremities tender mid body left trapezius no point tenderness x-rays negative will review with radiology  Lymphatic:  No adenopathy  Skin/Hair/Nails:  Skin warm, dry, and intact, no rashes  Neurologic:  Alert and oriented x3, no cranial nerve deficits, normal strength and tone, gait steady  Extremities:  No edema.  Edwin's sign negative.    Genitourinary: deferred  Pulses:  Equal bilaterally           This note has been dictated using voice recognition software. Any grammatical or context distortions are unintentional and inherent to the the software.

## 2021-06-19 NOTE — PROGRESS NOTES
[facility] Daily Progress     Patient Name: Nando Carreno  Date: 7/31/2018  Visit #: 4  PTA visit #:  -  Referral Diagnosis: cervical nerve root compression  Referring provider: Hollie Johnson CNP  Visit Diagnosis:     ICD-10-CM    1. Cervical radiculitis M54.12    2. Myofascial pain M79.1    3. Cervical nerve root compression G54.2    4. Generalized muscle weakness M62.81    5. Thoracic spine pain M54.6        Past Medical History:   Diagnosis Date     Arthritis      Cancer (H)     melanoma excised from right cheek     Cervical nerve root compression      Diverticulitis      GERD (gastroesophageal reflux disease)      Hyperlipidemia          Assessment:   Patient has recovered well from his flare up. He has noticed that his 3rd middle digit is no longer tingling and has full sensation. His  strength and pain into his right shoulder blade continue to improve. However, he still feels that his balance is off. His balance exercises were progressed today for his home program. Patient is encouraged to work on his program and return in 2 weeks.    HEP/POC compliance is  good .  Patient demonstrates understanding/independence with home program.  Patient is benefitting from skilled physical therapy and is making steady progress toward functional goals.  Patient is appropriate to continue with skilled physical therapy intervention, as indicated by initial plan of care.    Goal Status:  Pt. will demonstrate/verbalize independence in self-management of condition in : 6 weeks  Pt. will be independent with home exercise program in : 6 weeks  Patient will decrease : NDI score;by _ points;for improved quality of function;for improved quality of life;in 6 weeks  by ___ points: 5  Pt will: have  strength within at least 10# of his left side in order to return to PLOF.  Pt will: be able to drive independently with less difficulty in order to improve his QOL and independence within 8 weeks.  Pt will: have  "negative neural tension testing in R UE within 8 weeks in order to return to improve  strength.    Plan / Patient Education:     Continue with initial plan of care.  Progress with home program as tolerated.     Exercises:  Exercise #1: median nerve glides- hands together  Comment #1: x20  Exercise #2: median nerve glides looking to the side  Comment #2: x20 on R  Exercise #3: ulnar nerve glides- hand on ear and butterfly  Comment #3: x20 on R  Exercise #4: scap sets  Comment #4: x10 w/ 5\" holds  Exercise #5: doorway pec stretch  Exercise #6: green foam exs  Comment #6: heel/toe walking  Exercise #7: rhomberg on foam, 1/2 tandem B, SLS B  Comment #7: 1'     Plan for next visit: GOALS, TUG as needed, rows and shoulder ext, stepping onto/off of foam, walking w/ head turns    Subjective:     Pain Ratin/10    Middle finger on his R has full sensation now. The 4th-5th digits are about the same. Recovered from flare up. Doing his HEP 3x/week. Hasn't done his tennis ball massage because the median nerve glide takes away his pain. Has noticed his strength and sensation improve into his R UE and less pain into his R shoulder blade. No change in balance      Objective:   R  strength: 72, 68, 64#  L  strength: 95, 88#    Scar is closed.    Sit to stand: 11x/30 seconds w/o use of hands. stable    Rhomberg on foam:unsteadiness noted  Unsteadiness noted with heel/toe walking    Gait: no AD    Positive ulnar neural tension testing on R  Positive median neural tension testing on R at 160 degrees    Helped patient w/ better technique of ulnar nerve glide.   Poor posture. VC to sit up tall needed      Treatment Today     TREATMENT MINUTES COMMENTS   Evaluation     Self-care/ Home management     Manual therapy     Neuromuscular Re-education 15 Had patient performed the following for balance/coordination assessment: alternating tap ups on 3\" step, sit to stand test, side lunges, marching w/o HH, sidestepping w/o HH, " adan on foam.    Therapeutic Activity     Therapeutic Exercises 12 Discussed progress. Objective measures taken. Progressed HEP- see flow sheet for details. Answered patient questions.    Gait training     Modality__________________                Total 27    Blank areas are intentional and mean the treatment did not include these items.       Ashutosh Lou, PT, DPT  7/31/2018

## 2021-06-20 NOTE — PROGRESS NOTES
"[facility] Daily Progress     Patient Name: Nando Carreno  Date: 9/18/2018  Visit #: 8  PTA visit #:  -  Referral Diagnosis: cervical nerve root compression  Referring provider: Hollie Johnson  Visit Diagnosis:     ICD-10-CM    1. Cervical radiculitis M54.12    2. Myofascial pain M79.1    3. Cervical nerve root compression G54.2    4. Generalized muscle weakness M62.81    5. Thoracic spine pain M54.6        Past Medical History:   Diagnosis Date     Arthritis      Cancer (H)     melanoma excised from right cheek     Cervical nerve root compression      Diverticulitis      GERD (gastroesophageal reflux disease)      Hyperlipidemia          Assessment:   Patient feels that he's made \"a lot\" of progress. His right side is doing very well and is maintaining with his HEP. His left side is doing significantly better with combination of manual therapy and scalene/UT stretches. His balance feels the same.    HEP/POC compliance is  good .  Patient demonstrates understanding/independence with home program.  Patient is benefitting from skilled physical therapy and is making steady progress toward functional goals.  Patient is appropriate to continue with skilled physical therapy intervention, as indicated by initial plan of care.    Goal Status (progressing towards):  Pt. will demonstrate/verbalize independence in self-management of condition in : 6 weeks  Pt. will be independent with home exercise program in : 6 weeks;Met  Patient will decrease : NDI score;by _ points;for improved quality of function;for improved quality of life;in 6 weeks;Met  by ___ points: 5  Pt will: have  strength within at least 10# of his left side in order to return to PLOF.  Pt will: be able to drive independently with less difficulty in order to improve his QOL and independence within 8 weeks. (GOAL MET)  Pt will: have negative neural tension testing in R UE within 8 weeks in order to return to improve  strength. (GOAL " "MET)    Plan / Patient Education:     Continue with initial plan of care.  Progress with home program as tolerated.     Exercises:  Exercise #1: median nerve glides- hands together  Comment #1: x20  Exercise #2: median nerve glides looking to the side  Comment #2: x20 on R  Exercise #3: ulnar nerve glides- hand on ear and butterfly  Comment #3: x20 on R  Exercise #4: scap sets  Comment #4: x10 w/ 5\" holds  Exercise #5: doorway pec stretch  Exercise #6: green foam exs  Comment #6: heel/toe walking  Exercise #7: rhomberg on foam, 1/2 tandem B, SLS B  Comment #7: 1'   Exercise #8: rows w/ L4 and ext w/ L3  Comment #8: 5\" holds until fatigue B  Exercise #9: VOR exs w/ thumb and/or X  Comment #9: L scalene and UT stretch w/o overpressure    Plan for next visit: GOALS,  ASTM on B UT/rhomboids. Is he doing VOR sitting or standing? Getting easier or the same? Does he want to focus more on balance or no?    Subjective:     Pain Ratin/10 in R side of neck, \"just a little bit\"/10 in L side of neck    New exercises are really helping L side of neck. Isn't getting tingling and and burning into L side of neck.     Objective:   R  strength: 82, 79, 73#  L  strength: 110, 100, 98#     Limited cervical rotation to the L    Tight/tender L upper trap/rhomboid/scalene and 1st rib    Gait: no AD  Improved posture  Decreased pain and increased ability to turn head after MT today.  Neg neural tension testing for median and ulnar nerve glides    Neck Disability Score in %: 18      Treatment Today     TREATMENT MINUTES COMMENTS   Evaluation     Self-care/ Home management     Manual therapy 15 MFR to L UT, rhomboid, scalene and middle trap.   Neuromuscular Re-education     Therapeutic Activity     Therapeutic Exercises 8 Discussed progress. Answered patient questions. Objective measures taken. Reviewed goals.   Gait training     Modality__________________                Total 23    Blank areas are intentional and mean the " treatment did not include these items.       Ashutosh Lou, PT, DPT  9/18/2018

## 2021-06-20 NOTE — PROGRESS NOTES
[facility] Daily Progress     Patient Name: Nando Carreno  Date: 9/11/2018  Visit #: 8  PTA visit #:  -  Referral Diagnosis: cervical nerve root compression  Referring provider: Hollie Johnson  Visit Diagnosis:     ICD-10-CM    1. Cervical radiculitis M54.12    2. Myofascial pain M79.1    3. Cervical nerve root compression G54.2    4. Generalized muscle weakness M62.81    5. Thoracic spine pain M54.6        Past Medical History:   Diagnosis Date     Arthritis      Cancer (H)     melanoma excised from right cheek     Cervical nerve root compression      Diverticulitis      GERD (gastroesophageal reflux disease)      Hyperlipidemia          Assessment:   The right side of his neck and upper back feels 99% better. His 4th digit seems to have improved   His left side felt significantly better again after MT today.     HEP/POC compliance is  good .  Patient demonstrates understanding/independence with home program.  Patient is benefitting from skilled physical therapy and is making steady progress toward functional goals.  Patient is appropriate to continue with skilled physical therapy intervention, as indicated by initial plan of care.    Goal Status (progressing towards):  Pt. will demonstrate/verbalize independence in self-management of condition in : 6 weeks  Pt. will be independent with home exercise program in : 6 weeks;Met  Patient will decrease : NDI score;by _ points;for improved quality of function;for improved quality of life;in 6 weeks  by ___ points: 5  Pt will: have  strength within at least 10# of his left side in order to return to PLOF.  Pt will: be able to drive independently with less difficulty in order to improve his QOL and independence within 8 weeks. (GOAL MET)  Pt will: have negative neural tension testing in R UE within 8 weeks in order to return to improve  strength.    Plan / Patient Education:     Continue with initial plan of care.  Progress with home program as  "tolerated.     Exercises:  Exercise #1: median nerve glides- hands together  Comment #1: x20  Exercise #2: median nerve glides looking to the side  Comment #2: x20 on R  Exercise #3: ulnar nerve glides- hand on ear and butterfly  Comment #3: x20 on R  Exercise #4: scap sets  Comment #4: x10 w/ 5\" holds  Exercise #5: doorway pec stretch  Exercise #6: green foam exs  Comment #6: heel/toe walking  Exercise #7: rhomberg on foam, 1/2 tandem B, SLS B  Comment #7: 1'   Exercise #8: rows w/ L4 and ext w/ L3  Comment #8: 5\" holds until fatigue B  Exercise #9: VOR exs w/ thumb and/or X  Comment #9: L scalene and UT stretch w/o overpressure    Plan for next visit: GOALS, how did vision test go, Is stretching on L UT/scalene helping, ASTM on B UT/rhomboids.     Subjective:     Pain Ratin-5/10 in L side of neck    R side is feeling significantly better. Feels 50% better on the right. The left side feels really good after MT, but then the pain gradually comes back after 1-2 days. He gets pins/needles and then sharp pain into L shoulder. 4th-5th digits are coming around. Getting vision checked- wondering if this is playing a role in his balance      Objective:   R  strength: 75,, 75, 71#  L  strength: 95, 88#     Limited cervical rotation to the L    Sit to stand: 11x/30 seconds w/o use of hands. stable    Rhomberg on foam:unsteadiness noted  Unsteadiness noted with heel/toe walking  Tight/tender L upper trap/rhomboid/scalene and 1st rib    Cervical AROM: increased L sided neck pain with ext, flex, L SB, L rot (before MT)  Improved rotation after MT    Gait: no AD  Improved posture  Decreased pain and increased ability to turn head after MT today.  Neg neural tension testing for median and ulnar nerve glides      Treatment Today     TREATMENT MINUTES COMMENTS   Evaluation     Self-care/ Home management     Manual therapy 14 MFR to L UT, rhomboid, scalene and middle trap.   Neuromuscular Re-education     Therapeutic " Activity     Therapeutic Exercises 15 Discussed progress. Answered patient questions. Adding scalene and UT stretches to HEP   Gait training     Modality__________________                Total 29    Blank areas are intentional and mean the treatment did not include these items.       Ashutosh Lou, PT, DPT  9/11/2018

## 2021-06-20 NOTE — PROGRESS NOTES
[facility] Daily Progress     Patient Name: Nando Carreno  Date: 8/28/2018  Visit #: 6  PTA visit #:  -  Referral Diagnosis: cervical nerve root compression  Referring provider: Hollie Johnson CNP  Visit Diagnosis:     ICD-10-CM    1. Cervical radiculitis M54.12    2. Myofascial pain M79.1    3. Cervical nerve root compression G54.2    4. Generalized muscle weakness M62.81    5. Thoracic spine pain M54.6        Past Medical History:   Diagnosis Date     Arthritis      Cancer (H)     melanoma excised from right cheek     Cervical nerve root compression      Diverticulitis      GERD (gastroesophageal reflux disease)      Hyperlipidemia          Assessment:   Patient's  strength continues to improve on his right side. The myofascial area that was worked on during last session has felt good every since. His left side felt significantly better after MT today    HEP/POC compliance is  good .  Patient demonstrates understanding/independence with home program.  Patient is benefitting from skilled physical therapy and is making steady progress toward functional goals.  Patient is appropriate to continue with skilled physical therapy intervention, as indicated by initial plan of care.    Goal Status (progressing towards):  Pt. will demonstrate/verbalize independence in self-management of condition in : 6 weeks  Pt. will be independent with home exercise program in : 6 weeks  Patient will decrease : NDI score;by _ points;for improved quality of function;for improved quality of life;in 6 weeks  by ___ points: 5  Pt will: have  strength within at least 10# of his left side in order to return to PLOF.  Pt will: be able to drive independently with less difficulty in order to improve his QOL and independence within 8 weeks.  Pt will: have negative neural tension testing in R UE within 8 weeks in order to return to improve  strength.    Plan / Patient Education:     Continue with initial plan of  "care.  Progress with home program as tolerated.     Exercises:  Exercise #1: median nerve glides- hands together  Comment #1: x20  Exercise #2: median nerve glides looking to the side  Comment #2: x20 on R  Exercise #3: ulnar nerve glides- hand on ear and butterfly  Comment #3: x20 on R  Exercise #4: scap sets  Comment #4: x10 w/ 5\" holds  Exercise #5: doorway pec stretch  Exercise #6: green foam exs  Comment #6: heel/toe walking  Exercise #7: rhomberg on foam, 1/2 tandem B, SLS B  Comment #7: 1'   Exercise #8: rows w/ L4 and ext w/ L3  Comment #8: 5\" holds until fatigue B    Plan for next visit: GOALS, ASTM on B UT/rhomboids. Re-test nerves on R. Stepping onto/off of foam,     Subjective:     Pain Ratin/10    No pain but left upper back/neck has been sore. Hasn't been doing exs as much due to working on daughter's house but is good about doing his nerve exs. Balance hasn't changed      Objective:   R  strength: 80, 70, 68#  L  strength: 95, 88#    Sit to stand: 11x/30 seconds w/o use of hands. stable    Rhomberg on foam:unsteadiness noted  Unsteadiness noted with heel/toe walking  Tight/tender R upper trap/rhomboid    Gait: no AD  Poor posture.   Decreased pain on L side after MT today.   +neural tension testing for median and ulnar nerve glides      Treatment Today     TREATMENT MINUTES COMMENTS   Evaluation     Self-care/ Home management     Manual therapy 13 ASTM to L UT, rhomboid, and middle trap.   Neuromuscular Re-education     Therapeutic Activity     Therapeutic Exercises 10 Discussed progress. Answered patient questions.    Gait training     Modality__________________                Total 23    Blank areas are intentional and mean the treatment did not include these items.       Ashutosh Lou, PT, DPT  2018    "

## 2021-06-20 NOTE — PROGRESS NOTES
[facility] Daily Progress     Patient Name: Nando Carreno  Date: 8/21/2018  Visit #: 5  PTA visit #:  -  Referral Diagnosis: cervical nerve root compression  Referring provider: Hollie Johnson CNP  Visit Diagnosis:     ICD-10-CM    1. Cervical radiculitis M54.12    2. Myofascial pain M79.1    3. Cervical nerve root compression G54.2    4. Generalized muscle weakness M62.81    5. Thoracic spine pain M54.6        Past Medical History:   Diagnosis Date     Arthritis      Cancer (H)     melanoma excised from right cheek     Cervical nerve root compression      Diverticulitis      GERD (gastroesophageal reflux disease)      Hyperlipidemia          Assessment:   Patient's 3rd digit is improving with sensation and his 4th-5th digits are becoming more tingling rather than constant numbness. He continues to have positive neural tension testing in his R UE, along with poor posture. He responded well to MT today. His balance doesn't seem to be improving. We discussed increasing his therapy to 2x/week to work on his balance, which he declined for the time being since he's leaving town soon for a few trips. He would like to maybe look more into this later in the fall.    HEP/POC compliance is  good .  Patient demonstrates understanding/independence with home program.  Patient is benefitting from skilled physical therapy and is making steady progress toward functional goals.  Patient is appropriate to continue with skilled physical therapy intervention, as indicated by initial plan of care.    Goal Status:  Pt. will demonstrate/verbalize independence in self-management of condition in : 6 weeks  Pt. will be independent with home exercise program in : 6 weeks  Patient will decrease : NDI score;by _ points;for improved quality of function;for improved quality of life;in 6 weeks  by ___ points: 5  Pt will: have  strength within at least 10# of his left side in order to return to PLOF.  Pt will: be able to  "drive independently with less difficulty in order to improve his QOL and independence within 8 weeks.  Pt will: have negative neural tension testing in R UE within 8 weeks in order to return to improve  strength.    Plan / Patient Education:     Continue with initial plan of care.  Progress with home program as tolerated.     Exercises:  Exercise #1: median nerve glides- hands together  Comment #1: x20  Exercise #2: median nerve glides looking to the side  Comment #2: x20 on R  Exercise #3: ulnar nerve glides- hand on ear and butterfly  Comment #3: x20 on R  Exercise #4: scap sets  Comment #4: x10 w/ 5\" holds  Exercise #5: doorway pec stretch  Exercise #6: green foam exs  Comment #6: heel/toe walking  Exercise #7: rhomberg on foam, 1/2 tandem B, SLS B  Comment #7: 1'   Exercise #8: rows w/ L4 and ext w/ L3  Comment #8: 5\" holds until fatigue B    Plan for next visit: GOALS, ASTM on B UT/rhomboids. Balance as he wants/needs    Subjective:     Pain Ratin/10    3rd digit \"is coming around\". Pain by shoulder blade comes and goes and is better with exercise. Balance is still wobbly- not better. 4th-5th digits are more tingling now       Objective:   R  strength: 76, 74, 67#  L  strength: 95, 88#    Sit to stand: 11x/30 seconds w/o use of hands. stable    Rhomberg on foam:unsteadiness noted  Unsteadiness noted with heel/toe walking  Tight/tender R upper trap/rhomboid    Gait: no AD  Poor posture.   Decreased pain after MT today.   +neural tension testing for median and ulnar nerve glides      Treatment Today     TREATMENT MINUTES COMMENTS   Evaluation     Self-care/ Home management     Manual therapy 15 ASTM to R UT, rhomboid, and middle trap.   Neuromuscular Re-education     Therapeutic Activity     Therapeutic Exercises 14 Discussed progress. Objective measures taken. Progressed HEP- see flow sheet for details. Answered patient questions.    Gait training     Modality__________________              "   Total 29    Blank areas are intentional and mean the treatment did not include these items.       Ashutosh Lou, PT, DPT  8/21/2018

## 2021-06-20 NOTE — PROGRESS NOTES
[facility] Daily Progress     Patient Name: Nando Carreno  Date: 9/4/2018  Visit #: 7  PTA visit #:  -  Referral Diagnosis: cervical nerve root compression  Referring provider: Hollie Johnson  Visit Diagnosis:     ICD-10-CM    1. Cervical radiculitis M54.12    2. Myofascial pain M79.1    3. Cervical nerve root compression G54.2    4. Generalized muscle weakness M62.81    5. Thoracic spine pain M54.6        Past Medical History:   Diagnosis Date     Arthritis      Cancer (H)     melanoma excised from right cheek     Cervical nerve root compression      Diverticulitis      GERD (gastroesophageal reflux disease)      Hyperlipidemia          Assessment:   Patient's  strength continues to improve on his right side. The right side of his neck and upper back feels 99% better. His 4th digit seems to have improved   His left side felt significantly better after MT today    HEP/POC compliance is  good .  Patient demonstrates understanding/independence with home program.  Patient is benefitting from skilled physical therapy and is making steady progress toward functional goals.  Patient is appropriate to continue with skilled physical therapy intervention, as indicated by initial plan of care.    Goal Status (progressing towards):  Pt. will demonstrate/verbalize independence in self-management of condition in : 6 weeks  Pt. will be independent with home exercise program in : 6 weeks  Patient will decrease : NDI score;by _ points;for improved quality of function;for improved quality of life;in 6 weeks  by ___ points: 5  Pt will: have  strength within at least 10# of his left side in order to return to PLOF.  Pt will: be able to drive independently with less difficulty in order to improve his QOL and independence within 8 weeks.  Pt will: have negative neural tension testing in R UE within 8 weeks in order to return to improve  strength.    Plan / Patient Education:     Continue with initial plan of  "care.  Progress with home program as tolerated.     Exercises:  Exercise #1: median nerve glides- hands together  Comment #1: x20  Exercise #2: median nerve glides looking to the side  Comment #2: x20 on R  Exercise #3: ulnar nerve glides- hand on ear and butterfly  Comment #3: x20 on R  Exercise #4: scap sets  Comment #4: x10 w/ 5\" holds  Exercise #5: doorway pec stretch  Exercise #6: green foam exs  Comment #6: heel/toe walking  Exercise #7: rhomberg on foam, 1/2 tandem B, SLS B  Comment #7: 1'   Exercise #8: rows w/ L4 and ext w/ L3  Comment #8: 5\" holds until fatigue B  Exercise #9: VOR exs w/ thumb and/or X    Plan for next visit: GOALS, ASTM on B UT/rhomboids. Stepping onto/off of foam,     Subjective:     Pain Ratin-7/10 in L side of neck when it's bad.     4th digit on R seems to starting to come around. R side of neck is much better. Left side of neck is better but still stiff. Pain is intermittent. Pain in R side is better 99% of the time.       Objective:   R  strength: 80, 78, 74#  L  strength: 95, 88#    Sit to stand: 11x/30 seconds w/o use of hands. stable    Rhomberg on foam:unsteadiness noted  Unsteadiness noted with heel/toe walking  Tight/tender L upper trap/rhomboid/scalene and 1st rib    Cervical AROM: increased L sided neck pain with ext, flex, L SB, L rot (before MT)    Gait: no AD  Improved posture  Decreased pain and increased ability to turn head after MT today.  Neg neural tension testing for median and ulnar nerve glides      Treatment Today     TREATMENT MINUTES COMMENTS   Evaluation     Self-care/ Home management     Manual therapy 13 ASTM to L UT, rhomboid, and middle trap.   Neuromuscular Re-education     Therapeutic Activity     Therapeutic Exercises 10 Discussed progress. Answered patient questions.    Gait training     Modality__________________                Total 28    Blank areas are intentional and mean the treatment did not include these items.       Ashutosh " Dasha, PT, DPT  9/4/2018

## 2021-06-20 NOTE — PROGRESS NOTES
[facility] Daily Progress     Patient Name: Nando Carreno  Date: 9/25/2018  Visit #: 10  PTA visit #:  -  Referral Diagnosis: cervical nerve root compression  Referring provider: Hollie Johnson  Visit Diagnosis:     ICD-10-CM    1. Cervical radiculitis M54.12    2. Myofascial pain M79.1    3. Cervical nerve root compression G54.2    4. Generalized muscle weakness M62.81    5. Thoracic spine pain M54.6        Past Medical History:   Diagnosis Date     Arthritis      Cancer (H)     melanoma excised from right cheek     Cervical nerve root compression      Diverticulitis      GERD (gastroesophageal reflux disease)      Hyperlipidemia          Assessment:   Patient continues to demonstrate improvements in right  strength. He also notes today that manual therapy to his left upper trap/neck area seems to be providing longer lasting relief (up to 1 week) in comparison to the first few sessions that he had with it.     HEP/POC compliance is  good .  Patient demonstrates understanding/independence with home program.  Patient is benefitting from skilled physical therapy and is making steady progress toward functional goals.  Patient is appropriate to continue with skilled physical therapy intervention, as indicated by initial plan of care.    Goal Status (progressing towards):  Pt. will demonstrate/verbalize independence in self-management of condition in : 6 weeks  Pt. will be independent with home exercise program in : 6 weeks;Met  Patient will decrease : NDI score;by _ points;for improved quality of function;for improved quality of life;in 6 weeks;Met  by ___ points: 5  Pt will: have  strength within at least 10# of his left side in order to return to PLOF.  Pt will: be able to drive independently with less difficulty in order to improve his QOL and independence within 8 weeks. (GOAL MET)  Pt will: have negative neural tension testing in R UE within 8 weeks in order to return to improve   "strength. (GOAL MET)    Plan / Patient Education:     Continue with initial plan of care.  Progress with home program as tolerated.     Exercises:  Exercise #1: median nerve glides- hands together  Comment #1: x20  Exercise #2: median nerve glides looking to the side  Comment #2: x20 on R  Exercise #3: ulnar nerve glides- hand on ear and butterfly  Comment #3: x20 on R  Exercise #4: scap sets  Comment #4: x10 w/ 5\" holds  Exercise #5: doorway pec stretch  Exercise #6: green foam exs  Comment #6: heel/toe walking  Exercise #7: rhomberg on foam, 1/2 tandem B, SLS B  Comment #7: 1'   Exercise #8: rows w/ L4 and ext w/ L3  Comment #8: 5\" holds until fatigue B  Exercise #9: VOR exs w/ thumb and/or X  Comment #9: L scalene and UT stretch w/o overpressure    Plan for next visit:  How are X's going (up/down in sitting)? ASTM on L UT/rhomboids.     Subjective:     Pain Ratin/10 in R side of neck, \"just a little bit\"/10 in L side of neck    A little more numbness into R 4th-5th digits. Can barely feel pain into R shoulder blade. Does his exercises and it goes away again. L neck/shoulder area comes and goes. Feels that he's made about 50% progress. He notices that his recliner sets off his neck pain on the left.     Objective:   R  strength: 86, 82, 75#  L  strength: 110, 100, 98#     Limited cervical rotation to the L    Tight/tender L upper trap/rhomboid/scalene and 1st rib    Gait: no AD  Improved posture  Decreased pain and increased ability to turn head after MT today.  Neg neural tension testing for median and ulnar nerve glides    Neck Disability Score in %: 18      Treatment Today     TREATMENT MINUTES COMMENTS   Evaluation     Self-care/ Home management     Manual therapy 18 MFR to L UT, rhomboid, scalene and middle trap.   Neuromuscular Re-education     Therapeutic Activity     Therapeutic Exercises 12 Discussed progress and \"X\" vestibular exercise. Made modifications to the exercise- short time (5-10 " sec), seated, and looking at dull/non-busy background. Answered patient questions. Objective measures taken.    Gait training     Modality__________________                Total 30    Blank areas are intentional and mean the treatment did not include these items.       Ashutosh Lou, PT, DPT  9/25/2018

## 2021-06-21 NOTE — PROGRESS NOTES
Assessment/Plan:      Diagnoses and all orders for this visit:    Cervical radicular pain  -     MR Cervical Spine With Without Contrast; Future; Expected date: 10/22/18  -     MR Cervical Spine With Without Contrast; Standing  -     MR Cervical Spine With Without Contrast    Neural foraminal stenosis of cervical spine  -     MR Cervical Spine With Without Contrast; Future; Expected date: 10/22/18  -     MR Cervical Spine With Without Contrast; Standing  -     MR Cervical Spine With Without Contrast    Myofascial muscle pain        Assessment: Pleasant 67-year-old gentleman with a history of reflux with:    1.  6-month history of left sided cervical radicular pain in the C5 distribution with left-sided neck pain to the left deltoid.  He does have moderate foraminal stenosis in the left at C4-5 as well as at C3-4.    2.  History of cervical spine pain with right arm pain paresthesias and weakness.  Cervical decompression C8-T1 was done in May 2018 currently doing quite well.    3.  Myofascial pain parascapular region on the left.      Discussion:    1.  Discussed the diagnosis and treatment options with the patient and his wife.  We discussed options of imaging versus cervical epidural.  He has completed physical therapy and is not had any improvement in fact worsening of his symptoms.    2.  MRI cervical spine to evaluate as this is from a fall and has been since his last MRI that this occurred.    3.  Not interested in medications.    4.  We will evaluate images and contact him by phone with results and further recommendations.  Would recommend likely a left C4-5 transforaminal epidural but would like to see if there is new disc herniation on MRI.      It was our pleasure caring for your patient today, if there any questions or concerns please do not hesitate to contact us.      Subjective:   Patient ID: Nando Carreno is a 68 y.o. male.    History of Present Illness: Patient presents for evaluation of cervical  spine pain with left parascapular pain and left shoulder pain and paresthesias to the deltoid.  Pain started last April.  Since that time he has had cervical decompression for right upper extremity radiculopathy and that is doing very well.  He had decompression by Dr. Victor for C8/T1 radiculopathy and is gaining strength in his right hand and is quite pleased with that.    In April of this year he fell on his left shoulder in the garage tripped over a cord.  Landed on his shoulder.  Since that time has had worsening pain.  Has been doing physical therapy but his pain is actually been worsening in the left parascapular region to the left deltoid now.  Pain is a 4/10 today 8/10 at worst.  Worse with turning his head to the left gets a numbness and tingling in the left deltoid.  Better with not turning his head to the left.      Imaging: MRI cervical spine from [place] personally reviewed February 2018.  Multilevel degenerative disc disease autofusion C5-6 prior laminoplasty.  C4-5 there is moderate left foraminal stenosis.  At C3-4 moderate left foraminal stenosis.    Review of Systems: Complains of numbness and tingling left deltoid.  Still some numbness and tingling right digit 4 and 5.  Poor balance.  No bowel or bladder incontinence, weakness, headache, dizziness, nausea, vomiting, blurred vision.    Past Medical History:   Diagnosis Date     Arthritis      Cancer (H)     melanoma excised from right cheek     Cervical nerve root compression      Diverticulitis      GERD (gastroesophageal reflux disease)      Hyperlipidemia        The following portions of the patient's history were reviewed and updated as appropriate: allergies, current medications, past family history, past medical history, past social history, past surgical history and problem list.      Objective:   Physical Exam:    Vitals:    10/22/18 0846   BP: 143/77   Pulse: 86       General: Alert and oriented with normal affect. Attention, knowledge,  memory, and language are intact. No acute distress.   Eyes: Sclerae are clear.  Respirations: Unlabored.    Gait:  Nonantalgic  Decreased rotation to the left cervical spine with positive Spurling's to the left.  Sensation is intact to light touch throughout the left upper extremity  Reflexes are 2+ and symmetric in the biceps triceps and brachioradialis with negative Hoffmans.      Manual muscle testing reveals:  Right /Left out of 5  5/5 shoulder abductors  5/5 elbow flexors  5/5 elbow extensors  5/5 wrist extensors  5/5 interosseus  5/5 finger flexors

## 2021-06-21 NOTE — PROGRESS NOTES
[facility] Daily Progress     Patient Name: Nando Carreno  Date: 10/16/2018  Visit #: 11  PTA visit #:  -  Referral Diagnosis: cervical nerve root compression  Referring provider: Hollie Johnson  Visit Diagnosis:     ICD-10-CM    1. Cervical radiculitis M54.12    2. Myofascial pain M79.18    3. Cervical nerve root compression G54.2    4. Generalized muscle weakness M62.81    5. Thoracic spine pain M54.6        Past Medical History:   Diagnosis Date     Arthritis      Cancer (H)     melanoma excised from right cheek     Cervical nerve root compression      Diverticulitis      GERD (gastroesophageal reflux disease)      Hyperlipidemia          Assessment:   Patient is a pleasant 68 yom that feels significantly better from his right-sided symptoms after his operation with Dr. Vcitor in May 2018. He is rarely having shoulder blade pain and has gained quite a bit of sensation and strength back into his right hand. However, he does continue to get numbness/tingling into his 4th and 5th digits. One of there complaint that he has includes left-sided neck pain that began in the spring of 2018 after he fell. He responds very well to myofascial release, nerve glides, and stretching of his left scalene/upper trap, but the pain relief seems to only be short-term and then returns again within a week or even a few days. Due to patient's excellent compliance in therapy and lack of response to treatments on the left side of his neck, I'm going to refer him back to Dr. Teixeira for further assessment. He has not had any imaging on his neck nor has he been evaluated by anyone other than myself. Due to the extensive surgical history on his neck, joint mobilizations and traction was not performed on his neck, as I did not feel comfortable doing these unless he had further assessments done. He feels ready to independently manage his condition with the right side of his neck and upper extremity, so he is now discharged and  "will need a new order to resume in the future.    HEP/POC compliance is  good .  Patient demonstrates understanding/independence with home program.  Patient is benefitting from skilled physical therapy and is making steady progress toward functional goals.  Patient is appropriate to continue with skilled physical therapy intervention, as indicated by initial plan of care.    Goal Status (progressing towards):  Pt. will demonstrate/verbalize independence in self-management of condition in : 6 weeks;Met  Pt. will be independent with home exercise program in : 6 weeks;Met  Patient will decrease : NDI score;by _ points;for improved quality of function;for improved quality of life;in 6 weeks;Met  by ___ points: 5  Pt will: have  strength within at least 10# of his left side in order to return to PLOF. (GOAL NOT MET)  Pt will: be able to drive independently with less difficulty in order to improve his QOL and independence within 8 weeks. (GOAL MET)  Pt will: have negative neural tension testing in R UE within 8 weeks in order to return to improve  strength. (GOAL MET)    Plan / Patient Education:     Continue with initial plan of care.  Progress with home program as tolerated.     Exercises:  Exercise #1: median nerve glides- hands together  Comment #1: x20  Exercise #2: median nerve glides looking to the side  Comment #2: x20 on R  Exercise #3: ulnar nerve glides- hand on ear and butterfly  Comment #3: x20 on R  Exercise #4: scap sets  Comment #4: x10 w/ 5\" holds  Exercise #5: doorway pec stretch  Exercise #6: green foam exs  Comment #6: heel/toe walking  Exercise #7: rhomberg on foam, 1/2 tandem B, SLS B  Comment #7: 1'   Exercise #8: rows w/ L4 and ext w/ L3  Comment #8: 5\" holds until fatigue B  Exercise #9: VOR exs w/ thumb and/or X  Comment #9: L scalene and UT stretch w/o overpressure    Subjective:     Pain Ratin/10 in R side of neck, \"just a little bit\"/10 in L side of neck    L side of the neck is " painful again. Some days are worse than others depending on what he does the day before. Seems to get only temporary relief with therapy on L side. His right side has made significant progress. Hardly ever gets R shoulder blade pain anymore. Still has some numbness into 4th and 5th digits.     Objective:   R  strength: 86, 81, 76#  L  strength: 110, 100, 98#     Cervical AROM:   Flex- increase in pain in L side of neck, WFL   Ext- increase in pain in L side of neck, WFL   L rot- increase in pain in L side of neck, limited    R rot- min increase in pain in L side of neck   L SB- min increase in pain in L side of neck   R SB- WFL, pain-free        Tight/tender L upper trap/rhomboid/scalene and 1st rib    Gait: no AD  Improved posture  Decreased pain and increased ability to turn head after MT today.  Neg neural tension testing for median and ulnar nerve jody    Neck Disability Score in %: 18      Treatment Today     TREATMENT MINUTES COMMENTS   Evaluation     Self-care/ Home management     Manual therapy 16 MFR to L UT, rhomboid, scalene and middle trap.   Neuromuscular Re-education     Therapeutic Activity     Therapeutic Exercises 12 Discussed progress and goals. Objective measures taken. Answered patient questions/concerns.   Gait training     Modality__________________                Total 28    Blank areas are intentional and mean the treatment did not include these items.       Ashutosh Lou, PT, DPT  10/16/2018

## 2021-06-22 NOTE — PROGRESS NOTES
Assessment/Plan:      Diagnoses and all orders for this visit:    Cervical radicular pain  -     gabapentin (NEURONTIN) 100 MG capsule; Take 100 mg by mouth 2 (two) times a day TAKE ONE CAPSULE AT BED TIME. .  Dispense: 120 capsule; Refill: 2    Neural foraminal stenosis of cervical spine  -     gabapentin (NEURONTIN) 100 MG capsule; Take 100 mg by mouth 2 (two) times a day TAKE ONE CAPSULE AT BED TIME. .  Dispense: 120 capsule; Refill: 2    Hand cramp        Assessment:Pleasant 67-year-old gentleman with a history of reflux with:     1.  Persistent but improved left-sided cervical spine pain and C5 radicular distribution to the deltoid.  He has moderate to severe foraminal stenosis on the left at C4-5.  Responded quite well for 1-2 weeks after cervical transforaminal epidural but the symptoms have started to return.     2. History of cervical spine pain with right arm pain paresthesias and weakness.  Cervical decompression C8-T1 was done in May 2018.  He is having some right hand cramping at times with fine motor skills likely related to healing from the surgery.  Reassurance provided.     3.  Myofascial pain parascapular region on the left.             Discussion:    1.  Overall he is doing better with gabapentin and cervical injection.  We discussed his use of gabapentin and options.  Overall he is doing quite well tolerating this well with no side effects.    2.  We will provide refill of gabapentin.  He is taking 100 mg daily.  He may continue with 100 mg daily and increase this to 100 mill grams twice a day if needed.  New prescription provided    3.  Reassurance provided regarding the right hand and will monitor.    4.  Follow-up with me as needed after returning from [place].      It was our pleasure caring for your patient today, if there any questions or concerns please do not hesitate to contact us.      Subjective:   Patient ID: Nando Carreno is a 68 y.o. male.    History of Present Illness: Patient  returns for evaluation of left parascapular pain shoulder pain.  Symptoms are improved with gabapentin.  Did not improve with the cervical epidural as well but still has some paresthesias into the left deltoid at times.  He can feel the symptoms returning and takes a gabapentin.  This is significantly improved his pain for nearly 24hours.  No side effects from the gabapentin doing quite well.  Only taking 100 mg a day.  Pain is a 3/10 today 6/10 at worst.  Worse with sitting.    Also has some cramping in the right hand.  Was working with a drill or some fine motor skills working on a car and had some cramping within the hand wondering if that is normal.  Has had cervical decompression on the right for a C8/T1 radiculopathy in May of this year and his strength has nearly returned and that otherwise doing quite well.        Review of Systems: Complains of numbness and tingling in left parascapular region.  No weakness.  His right hand is much improved.  No headaches dizziness nausea vomiting or blurred vision.  No balance changes.    Past Medical History:   Diagnosis Date     Arthritis      Cancer (H)     melanoma excised from right cheek     Cervical nerve root compression      Diverticulitis      GERD (gastroesophageal reflux disease)      Hyperlipidemia        The following portions of the patient's history were reviewed and updated as appropriate: allergies, current medications, past family history, past medical history, past social history, past surgical history and problem list.      Objective:   Physical Exam:    Vitals:    12/19/18 0854   BP: 121/77   Pulse: 92       General: Alert and oriented with normal affect. Attention, knowledge, memory, and language are intact. No acute distress.   Eyes: Sclerae are clear.  Respirations: Unlabored.   Gait:  Nonantalgic  No longer has muscle atrophy of the right hand  Sensation is intact to light touch throughout the upper extremities.  Reflexes are 2+ and symmetric in  the biceps triceps and brachioradialis with negative Hoffmans.      Manual muscle testing reveals:  Right /Left out of 5  5/5 shoulder abductors  5/5 elbow flexors  5/5 elbow extensors  5/5 wrist extensors  5-/5 interosseus  5/5 finger flexors

## 2021-06-22 NOTE — PROGRESS NOTES
Assessment/Plan:      Diagnoses and all orders for this visit:    Cervical radicular pain    Neural foraminal stenosis of cervical spine    Myofascial muscle pain        Assessment: Pleasant 67-year-old gentleman with a history of reflux with:    1.  Persistent but improved left-sided cervical spine pain and C5 radicular distribution to the deltoid.  He has moderate to severe foraminal stenosis on the left at C4-5.  Responded quite well for 1-2 weeks after cervical transforaminal epidural but the symptoms have started to return.    2. History of cervical spine pain with right arm pain paresthesias and weakness.  Cervical decompression C8-T1 was done in May 2018 currently doing quite well.     3.  Myofascial pain parascapular region on the left.          Discussion:    1.  I discussed the diagnosis and treatment options.  We discussed the MRI of the cervical spine.  At this point he is doing fairly well he has improved activity tolerance although his pain has been returning.  Reassurance provided and recommend monitoring symptoms for now without further intervention such as epidurals or surgical intervention and he agrees.    2.  He does have gabapentin at home and I would recommend he start taking 100 mg at bedtime increasing to 3 times daily to help with neuropathic pain.    3.  We will try heat.    4.  We will continue to monitor symptoms and avoid aggravating activities and follow-up in 3-4 weeks  before they leave for [place]      It was our pleasure caring for your patient today, if there any questions or concerns please do not hesitate to contact us.      Subjective:   Patient ID: Nando Carreno is a 68 y.o. male.    History of Present Illness:Patient presents for follow-up evaluation cervical spine left arm pain paresthesias to the deltoid in a C5 distribution.  Had an MRI since last visit and subsequently had a left C4-5 transforaminal epidural steroid injection.  During the injection had reproduction of  symptoms and his pain resolved for 1-1/2-2 weeks completely and the pain started to return with no new trauma.  Still 15% improved.  Pain is a 4/10 today 8/10 at worst.  Was worse with turning his head to the left which is now improved but still has some pain left neck and to the left deltoid last.  Was sitting in a hockey game for his 2 grandkids and it was there for 4 hours and by the end increased symptoms.  Overall does have improved tolerance for those activities however his pain is now more intermittent.  No new weakness.  No fevers chills or adverse effects from the injection.    Still having some weakness of the right hand but that is not new with some tingling of her right digit 5.  That is improved following his surgery.  With Dr. Victor.    Imaging: MRI report and images were personally reviewed and discussed with the patient.  A plastic model was utilized during the discussion.  MRI of the cervical spine personally reviewed.  Moderate to marketed cervical spondylosis with no high-grade central stenosis.  Prior laminoplasty C4-6 in the right with new right hemilaminectomy C7.  Multilevel degenerative changes with moderate to severe left foraminal stenosis C4-5.  And C3-4.    Review of Systems: *Still has numbness and tingling left deltoid left side of the neck.  weakness.  No bowel or bladder incontinence.  No headaches, dizziness, nausea, vomiting, blurred vision or balance deficits.    Past Medical History:   Diagnosis Date     Arthritis      Cancer (H)     melanoma excised from right cheek     Cervical nerve root compression      Diverticulitis      GERD (gastroesophageal reflux disease)      Hyperlipidemia        The following portions of the patient's history were reviewed and updated as appropriate: allergies, current medications, past family history, past medical history, past social history, past surgical history and problem list.      Objective:   Physical Exam:    Vitals:    11/28/18 0851   BP:  128/74   Pulse: 92       General: Alert and oriented with normal affect. Attention, knowledge, memory, and language are intact. No acute distress.   Eyes: Sclerae are clear.  Respirations: Unlabored.   Gait:  Nonantalgic.  Decreased cervical rotation to the left  Sensation is decreased to light touch right fifth digit.  Reflexes are 2+ and symmetric in the biceps triceps and brachioradialis with negative Hoffmans.      Manual muscle testing reveals:  Right /Left out of 5  5/5 shoulder abductors  5/5 elbow flexors  5/5 elbow extensors  5/5 wrist extensors  4/5 interosseus  5/5 finger flexors

## 2021-06-26 ENCOUNTER — HEALTH MAINTENANCE LETTER (OUTPATIENT)
Age: 71
End: 2021-06-26

## 2021-07-03 NOTE — ADDENDUM NOTE
Addendum Note by Trinidad Costa MD at 5/7/2018 11:03 AM     Author: Trinidad Costa MD Service: -- Author Type: Physician    Filed: 5/7/2018 11:03 AM Encounter Date: 5/7/2018 Status: Signed    : Trinidad Costa MD (Physician)    Addended by: TRINIDAD COSTA on: 5/7/2018 11:03 AM        Modules accepted: Orders

## 2021-08-15 DIAGNOSIS — J40 BRONCHITIS: ICD-10-CM

## 2021-08-18 RX ORDER — ALBUTEROL SULFATE 90 UG/1
AEROSOL, METERED RESPIRATORY (INHALATION)
Qty: 8.5 G | Refills: 3 | Status: SHIPPED | OUTPATIENT
Start: 2021-08-18 | End: 2021-12-01

## 2021-08-18 NOTE — TELEPHONE ENCOUNTER
"Routing refill request to provider for review/approval because:  ACT score    Last Written Prescription Date:  11/12/20  Last Fill Quantity: 1,  # refills: 0   Last office visit provider:  11/12/20     Requested Prescriptions   Pending Prescriptions Disp Refills     albuterol (PROAIR HFA/PROVENTIL HFA/VENTOLIN HFA) 108 (90 Base) MCG/ACT inhaler [Pharmacy Med Name: ALBUTEROL HFA INH (200 PUFFS)8.5GM] 8.5 g      Sig: INHALE 2 PUFFS BY MOUTH EVERY 4 HOURS AS NEEDED FOR WHEEZING.       Asthma Maintenance Inhalers - Anticholinergics Failed - 8/15/2021  4:43 PM        Failed - Asthma control assessment score within normal limits in last 6 months     Please review ACT score.           Passed - Patient is age 12 years or older        Passed - Medication is active on med list        Passed - Recent (6 mo) or future (30 days) visit within the authorizing provider's specialty     Patient had office visit in the last 6 months or has a visit in the next 30 days with authorizing provider or within the authorizing provider's specialty.  See \"Patient Info\" tab in inbasket, or \"Choose Columns\" in Meds & Orders section of the refill encounter.           Short-Acting Beta Agonist Inhalers Protocol  Failed - 8/15/2021  4:43 PM        Failed - Asthma control assessment score within normal limits in last 6 months     Please review ACT score.           Passed - Patient is age 12 or older        Passed - Medication is active on med list        Passed - Recent (6 mo) or future (30 days) visit within the authorizing provider's specialty     Patient had office visit in the last 6 months or has a visit in the next 30 days with authorizing provider or within the authorizing provider's specialty.  See \"Patient Info\" tab in inbasket, or \"Choose Columns\" in Meds & Orders section of the refill encounter.                 Clifford Mayen RN 08/18/21 8:59 AM  "

## 2021-08-20 ENCOUNTER — OFFICE VISIT (OUTPATIENT)
Dept: PHYSICAL MEDICINE AND REHAB | Facility: CLINIC | Age: 71
End: 2021-08-20
Payer: COMMERCIAL

## 2021-08-20 VITALS
WEIGHT: 193 LBS | SYSTOLIC BLOOD PRESSURE: 151 MMHG | BODY MASS INDEX: 27.02 KG/M2 | HEART RATE: 84 BPM | HEIGHT: 71 IN | DIASTOLIC BLOOD PRESSURE: 88 MMHG

## 2021-08-20 DIAGNOSIS — M54.2 CERVICAL SPINE PAIN: Primary | ICD-10-CM

## 2021-08-20 DIAGNOSIS — M47.812 ARTHROPATHY OF CERVICAL FACET JOINT: ICD-10-CM

## 2021-08-20 DIAGNOSIS — R20.2 RIGHT HAND PARESTHESIA: ICD-10-CM

## 2021-08-20 DIAGNOSIS — R25.2 LEG CRAMPING: ICD-10-CM

## 2021-08-20 DIAGNOSIS — M79.18 MYOFASCIAL PAIN: ICD-10-CM

## 2021-08-20 PROCEDURE — 99214 OFFICE O/P EST MOD 30 MIN: CPT | Performed by: PHYSICAL MEDICINE & REHABILITATION

## 2021-08-20 RX ORDER — GABAPENTIN 100 MG/1
CAPSULE ORAL
Qty: 90 CAPSULE | Refills: 3 | Status: SHIPPED | OUTPATIENT
Start: 2021-08-20 | End: 2022-07-27

## 2021-08-20 ASSESSMENT — PAIN SCALES - GENERAL: PAINLEVEL: MILD PAIN (2)

## 2021-08-20 ASSESSMENT — MIFFLIN-ST. JEOR: SCORE: 1643.6

## 2021-08-20 NOTE — PROGRESS NOTES
Assessment/Plan:      Nando was seen today for medication refill.    Diagnoses and all orders for this visit:    Cervical spine pain  -     gabapentin (NEURONTIN) 100 MG capsule; Take up to 3 capsules daily for neck pain    Arthropathy of cervical facet joint  -     gabapentin (NEURONTIN) 100 MG capsule; Take up to 3 capsules daily for neck pain    Right hand paresthesia  -     gabapentin (NEURONTIN) 100 MG capsule; Take up to 3 capsules daily for neck pain    Myofascial pain    Leg cramping         Assessment: Pleasant 71 year old male with a history of reflux with:     1.  Persistent left-sided cervical spine pain with pain along upper trapezius.  Could be related to cervical radicular pain as he has moderate to severe foraminal stenosis on the left at C4-5 or could be related to facet arthropathy which is severe on the left at C3-4.  Responded quite well for 1-2 weeks after cervical transforaminal epidural.  He actually does very well with 100 mg of gabapentin every morning.     2.  Right hand cramping with numbness and tingling digits 4 and 5 which persist.  History of cervical spine pain with right arm pain paresthesias and weakness.  Cervical decompression C8-T1 was done in May 2018.  He is having some right hand cramping at times with fine motor skills likely related to healing from the surgery.    We discussed options.     3.  Myofascial pain parascapular region on the left.    4.  Cramping of his feet could be related to polyneuropathy      Discussion:    1. *We discussed the diagnosis and treatment options.  We discussed options of continuing medications, therapy, EMG of his right arm or legs, MRI of the cervical spine.  Right now he is doing fairly well overall.    2.  We will provide gabapentin prescription 100 mg daily may increase this up to 300 mg daily.  Tried 300 mg capsules but was not that effective for him and wants to continue with 100s for now.   checked and appropriate    3.  Can try  over-the-counter supplements such as magnesium glycinate for cramping.    4.  Can consider MRI of the cervical spine and EMG right upper extremity.    5.  Follow-up as needed if symptoms progress.      It was our pleasure caring for your patient today, if there any questions or concerns please do not hesitate to contact us.      Subjective:   Patient ID: Nando Carreno is a 71 year old male.    History of Present Illness:Patient presents with his wife today for follow-up of left-sided cervical spine pain for the suboccipital region through the left shoulder.  He also has right hand numbness and cramping.  Has been taking gabapentin 100 mg every morning and helps with his left cervical spine pain which is from the left suboccipital region and upper cervical spine through the shoulder.  Once he takes gabapentin is pretty good throughout the day.  He tried taking 300 mg but did not notice any difference and has decreased back to 100 mg but needs a new prescription for the capsules.  Pain is an 8/10 at worst 2/10 today 1/10 at best.    He also has numbness and tingling in the right hand mostly digit 5.  He previously was having weakness but had cervical decompression.  He no longer has weakness but has a sense of cramping through his hand can be random.  He also notices some cramping in his feet but that is predominantly at night and some numbness of his great toes which he attributes to prior lumbar surgery.      Imaging: MRI cervical spine from November 2018 personally reviewed showing moderate to marked cervical degenerative changes/spondylosis with prior laminoplasty on the right C4-6 and laminectomy C7 on the right.  Moderate left foraminal stenosis C5-6 and marked left foraminal stenosis C6-7.  Review of Systems: Pertinent positives: Some numbness and tingling in his toes as well as cramping in the right hand.  Pertinent negatives:   No bowel or bladder incontinence.  No urinary retention.  No fevers,  "unintentional weight loss, balance changes, headaches, frequent falling, difficulty swallowing, or coordination difficulties.  All others reviewed are negative.       Past Medical History:   Diagnosis Date     Acid reflux        The following portions of the patient's history were reviewed and updated as appropriate: allergies, current medications, past family history, past medical history, past social history, past surgical history and problem list.           Objective:   Physical Exam:    BP (!) 151/88 (BP Location: Right arm, Patient Position: Sitting, Cuff Size: Adult Regular)   Pulse 84   Ht 5' 10.75\" (1.797 m)   Wt 193 lb (87.5 kg)   BMI 27.11 kg/m    Body mass index is 27.11 kg/m .      General: Alert and oriented with normal affect. Attention, knowledge, memory, and language are intact. No acute distress.   Eyes: Sclerae are clear.  Respirations: Unlabored.  Skin: No rashes seen.    Gait:  Nonantalgic  Decreased rotation cervical spine to the left.  Tenderness over the left posterior pillars C2-3 C3-4 region.  Mild hypertonic tissue textures upper trapezius on the left.  Sensation is decreased to light touch right digit 4 and 5.  Reflexes are 2+ and symmetric in the biceps triceps and brachioradialis with negative Hoffmans.     Manual muscle testing reveals:  Right /Left out of 5  5/5 shoulder abductors  5/5 elbow flexors  5/5 elbow extensors  5/5 wrist extensors  5/5 interosseus  5/5 finger flexors       "

## 2021-08-20 NOTE — LETTER
8/20/2021         RE: Nando Carreno  [address]  [place] [address]        Dear Colleague,    Thank you for referring your patient, Nando Carreno, to the [facility] [place]. Please see a copy of my visit note below.    Assessment/Plan:      Nando was seen today for medication refill.    Diagnoses and all orders for this visit:    Cervical spine pain  -     gabapentin (NEURONTIN) 100 MG capsule; Take up to 3 capsules daily for neck pain    Arthropathy of cervical facet joint  -     gabapentin (NEURONTIN) 100 MG capsule; Take up to 3 capsules daily for neck pain    Right hand paresthesia  -     gabapentin (NEURONTIN) 100 MG capsule; Take up to 3 capsules daily for neck pain    Myofascial pain    Leg cramping         Assessment: Pleasant 71 year old male with a history of reflux with:     1.  Persistent left-sided cervical spine pain with pain along upper trapezius.  Could be related to cervical radicular pain as he has moderate to severe foraminal stenosis on the left at C4-5 or could be related to facet arthropathy which is severe on the left at C3-4.  Responded quite well for 1-2 weeks after cervical transforaminal epidural.  He actually does very well with 100 mg of gabapentin every morning.     2.  Right hand cramping with numbness and tingling digits 4 and 5 which persist.  History of cervical spine pain with right arm pain paresthesias and weakness.  Cervical decompression C8-T1 was done in May 2018.  He is having some right hand cramping at times with fine motor skills likely related to healing from the surgery.    We discussed options.     3.  Myofascial pain parascapular region on the left.    4.  Cramping of his feet could be related to polyneuropathy      Discussion:    1. *We discussed the diagnosis and treatment options.  We discussed options of continuing medications, therapy, EMG of his right arm or legs, MRI of the cervical spine.  Right now he is doing fairly  well overall.    2.  We will provide gabapentin prescription 100 mg daily may increase this up to 300 mg daily.  Tried 300 mg capsules but was not that effective for him and wants to continue with 100s for now.   checked and appropriate    3.  Can try over-the-counter supplements such as magnesium glycinate for cramping.    4.  Can consider MRI of the cervical spine and EMG right upper extremity.    5.  Follow-up as needed if symptoms progress.      It was our pleasure caring for your patient today, if there any questions or concerns please do not hesitate to contact us.      Subjective:   Patient ID: Nando Carreno is a 71 year old male.    History of Present Illness:Patient presents with his wife today for follow-up of left-sided cervical spine pain for the suboccipital region through the left shoulder.  He also has right hand numbness and cramping.  Has been taking gabapentin 100 mg every morning and helps with his left cervical spine pain which is from the left suboccipital region and upper cervical spine through the shoulder.  Once he takes gabapentin is pretty good throughout the day.  He tried taking 300 mg but did not notice any difference and has decreased back to 100 mg but needs a new prescription for the capsules.  Pain is an 8/10 at worst 2/10 today 1/10 at best.    He also has numbness and tingling in the right hand mostly digit 5.  He previously was having weakness but had cervical decompression.  He no longer has weakness but has a sense of cramping through his hand can be random.  He also notices some cramping in his feet but that is predominantly at night and some numbness of his great toes which he attributes to prior lumbar surgery.      Imaging: MRI cervical spine from November 2018 personally reviewed showing moderate to marked cervical degenerative changes/spondylosis with prior laminoplasty on the right C4-6 and laminectomy C7 on the right.  Moderate left foraminal stenosis C5-6 and  "marked left foraminal stenosis C6-7.  Review of Systems: Pertinent positives: Some numbness and tingling in his toes as well as cramping in the right hand.  Pertinent negatives:   No bowel or bladder incontinence.  No urinary retention.  No fevers, unintentional weight loss, balance changes, headaches, frequent falling, difficulty swallowing, or coordination difficulties.  All others reviewed are negative.       Past Medical History:   Diagnosis Date     Acid reflux        The following portions of the patient's history were reviewed and updated as appropriate: allergies, current medications, past family history, past medical history, past social history, past surgical history and problem list.           Objective:   Physical Exam:    BP (!) 151/88 (BP Location: Right arm, Patient Position: Sitting, Cuff Size: Adult Regular)   Pulse 84   Ht 5' 10.75\" (1.797 m)   Wt 193 lb (87.5 kg)   BMI 27.11 kg/m    Body mass index is 27.11 kg/m .      General: Alert and oriented with normal affect. Attention, knowledge, memory, and language are intact. No acute distress.   Eyes: Sclerae are clear.  Respirations: Unlabored.  Skin: No rashes seen.    Gait:  Nonantalgic  Decreased rotation cervical spine to the left.  Tenderness over the left posterior pillars C2-3 C3-4 region.  Mild hypertonic tissue textures upper trapezius on the left.  Sensation is decreased to light touch right digit 4 and 5.  Reflexes are 2+ and symmetric in the biceps triceps and brachioradialis with negative Hoffmans.     Manual muscle testing reveals:  Right /Left out of 5  5/5 shoulder abductors  5/5 elbow flexors  5/5 elbow extensors  5/5 wrist extensors  5/5 interosseus  5/5 finger flexors           Again, thank you for allowing me to participate in the care of your patient.        Sincerely,        Luis A Teixeira, DO    "

## 2021-08-20 NOTE — PATIENT INSTRUCTIONS
1. Continue with Gabapentin 100-300mg daily for pain    2. We can consider an EMG of you arm or updated MRI of you neck    3. Try Magnesium Glycinate ( or other OTC such as CALM) to see if it helps your leg cramping.

## 2021-08-24 ENCOUNTER — OFFICE VISIT (OUTPATIENT)
Dept: FAMILY MEDICINE | Facility: CLINIC | Age: 71
End: 2021-08-24
Payer: COMMERCIAL

## 2021-08-24 ENCOUNTER — ANCILLARY PROCEDURE (OUTPATIENT)
Dept: GENERAL RADIOLOGY | Facility: CLINIC | Age: 71
End: 2021-08-24
Attending: FAMILY MEDICINE
Payer: COMMERCIAL

## 2021-08-24 VITALS
BODY MASS INDEX: 27.25 KG/M2 | DIASTOLIC BLOOD PRESSURE: 66 MMHG | HEART RATE: 82 BPM | SYSTOLIC BLOOD PRESSURE: 132 MMHG | OXYGEN SATURATION: 98 % | WEIGHT: 194 LBS

## 2021-08-24 DIAGNOSIS — R07.9 CHEST PAIN, UNSPECIFIED TYPE: ICD-10-CM

## 2021-08-24 DIAGNOSIS — E78.5 HYPERLIPIDEMIA LDL GOAL <100: ICD-10-CM

## 2021-08-24 DIAGNOSIS — R25.2 LEG CRAMPS: ICD-10-CM

## 2021-08-24 DIAGNOSIS — R07.9 CHEST PAIN, UNSPECIFIED TYPE: Primary | ICD-10-CM

## 2021-08-24 LAB
ALBUMIN SERPL-MCNC: 4.2 G/DL (ref 3.5–5)
ALP SERPL-CCNC: 62 U/L (ref 45–120)
ALT SERPL W P-5'-P-CCNC: 34 U/L (ref 0–45)
ANION GAP SERPL CALCULATED.3IONS-SCNC: 10 MMOL/L (ref 5–18)
AST SERPL W P-5'-P-CCNC: 26 U/L (ref 0–40)
ATRIAL RATE - MUSE: 73 BPM
BASOPHILS # BLD AUTO: 0 10E3/UL (ref 0–0.2)
BASOPHILS NFR BLD AUTO: 0 %
BILIRUB SERPL-MCNC: 0.6 MG/DL (ref 0–1)
BUN SERPL-MCNC: 12 MG/DL (ref 8–28)
CALCIUM SERPL-MCNC: 9.7 MG/DL (ref 8.5–10.5)
CHLORIDE BLD-SCNC: 104 MMOL/L (ref 98–107)
CHOLEST SERPL-MCNC: 223 MG/DL
CK SERPL-CCNC: 195 U/L (ref 30–190)
CO2 SERPL-SCNC: 28 MMOL/L (ref 22–31)
CREAT SERPL-MCNC: 0.93 MG/DL (ref 0.7–1.3)
DIASTOLIC BLOOD PRESSURE - MUSE: NORMAL MMHG
EOSINOPHIL # BLD AUTO: 0.1 10E3/UL (ref 0–0.7)
EOSINOPHIL NFR BLD AUTO: 2 %
ERYTHROCYTE [DISTWIDTH] IN BLOOD BY AUTOMATED COUNT: 12.5 % (ref 10–15)
FASTING STATUS PATIENT QL REPORTED: YES
GFR SERPL CREATININE-BSD FRML MDRD: 82 ML/MIN/1.73M2
GLUCOSE BLD-MCNC: 102 MG/DL (ref 70–125)
HCT VFR BLD AUTO: 47.4 % (ref 40–53)
HDLC SERPL-MCNC: 43 MG/DL
HGB BLD-MCNC: 16 G/DL (ref 13.3–17.7)
IMM GRANULOCYTES # BLD: 0 10E3/UL
IMM GRANULOCYTES NFR BLD: 0 %
INTERPRETATION ECG - MUSE: NORMAL
LDLC SERPL CALC-MCNC: 141 MG/DL
LYMPHOCYTES # BLD AUTO: 1.9 10E3/UL (ref 0.8–5.3)
LYMPHOCYTES NFR BLD AUTO: 26 %
MAGNESIUM SERPL-MCNC: 2.1 MG/DL (ref 1.8–2.6)
MCH RBC QN AUTO: 30.9 PG (ref 26.5–33)
MCHC RBC AUTO-ENTMCNC: 33.8 G/DL (ref 31.5–36.5)
MCV RBC AUTO: 92 FL (ref 78–100)
MONOCYTES # BLD AUTO: 0.6 10E3/UL (ref 0–1.3)
MONOCYTES NFR BLD AUTO: 9 %
NEUTROPHILS # BLD AUTO: 4.4 10E3/UL (ref 1.6–8.3)
NEUTROPHILS NFR BLD AUTO: 62 %
P AXIS - MUSE: 46 DEGREES
PLATELET # BLD AUTO: 129 10E3/UL (ref 150–450)
POTASSIUM BLD-SCNC: 4.2 MMOL/L (ref 3.5–5)
PR INTERVAL - MUSE: 210 MS
PROT SERPL-MCNC: 6.9 G/DL (ref 6–8)
QRS DURATION - MUSE: 78 MS
QT - MUSE: 374 MS
QTC - MUSE: 412 MS
R AXIS - MUSE: 6 DEGREES
RBC # BLD AUTO: 5.17 10E6/UL (ref 4.4–5.9)
SODIUM SERPL-SCNC: 142 MMOL/L (ref 136–145)
SYSTOLIC BLOOD PRESSURE - MUSE: NORMAL MMHG
T AXIS - MUSE: 12 DEGREES
TRIGL SERPL-MCNC: 194 MG/DL
TSH SERPL DL<=0.005 MIU/L-ACNC: 1.91 UIU/ML (ref 0.3–5)
VENTRICULAR RATE- MUSE: 73 BPM
VIT B12 SERPL-MCNC: 530 PG/ML (ref 213–816)
WBC # BLD AUTO: 7.1 10E3/UL (ref 4–11)

## 2021-08-24 PROCEDURE — 80061 LIPID PANEL: CPT | Performed by: FAMILY MEDICINE

## 2021-08-24 PROCEDURE — 93010 ELECTROCARDIOGRAM REPORT: CPT | Performed by: INTERNAL MEDICINE

## 2021-08-24 PROCEDURE — 71046 X-RAY EXAM CHEST 2 VIEWS: CPT | Mod: TC | Performed by: RADIOLOGY

## 2021-08-24 PROCEDURE — 80050 GENERAL HEALTH PANEL: CPT | Performed by: FAMILY MEDICINE

## 2021-08-24 PROCEDURE — 93005 ELECTROCARDIOGRAM TRACING: CPT | Performed by: FAMILY MEDICINE

## 2021-08-24 PROCEDURE — 83735 ASSAY OF MAGNESIUM: CPT | Performed by: FAMILY MEDICINE

## 2021-08-24 PROCEDURE — 36415 COLL VENOUS BLD VENIPUNCTURE: CPT | Performed by: FAMILY MEDICINE

## 2021-08-24 PROCEDURE — 82550 ASSAY OF CK (CPK): CPT | Performed by: FAMILY MEDICINE

## 2021-08-24 PROCEDURE — 82607 VITAMIN B-12: CPT | Performed by: FAMILY MEDICINE

## 2021-08-24 PROCEDURE — 99214 OFFICE O/P EST MOD 30 MIN: CPT | Performed by: FAMILY MEDICINE

## 2021-08-24 RX ORDER — NITROGLYCERIN 0.4 MG/1
0.4 TABLET SUBLINGUAL EVERY 5 MIN PRN
Qty: 25 TABLET | Refills: 0 | Status: SHIPPED | OUTPATIENT
Start: 2021-08-24 | End: 2021-12-01

## 2021-08-24 NOTE — PROGRESS NOTES
Nando Carreno  /66   Pulse 82   Wt 88 kg (194 lb)   SpO2 98%   BMI 27.25 kg/m       Assessment/Plan:                Nando was seen today for musculoskeletal problem, recheck, dizziness, chest pain and labs only.    Diagnoses and all orders for this visit:    Chest pain, unspecified type  -     EKG 12-lead, tracing only  -     XR Chest 2 Views; Future  -     CBC with Platelets & Differential; Future    Leg cramps  -     Comprehensive metabolic panel; Future  -     Magnesium; Future  -     TSH with free T4 reflex; Future  -     Vitamin B12; Future  -     CK total; Future    Hyperlipidemia LDL goal <100  -     Lipid panel reflex to direct LDL Fasting       History   Smoking Status     Former Smoker     Quit date: 1/1/1994   Smokeless Tobacco     Never Used     BP Readings from Last 1 Encounters:   08/24/21 132/66     Recent Labs   Lab Test 04/12/17  0758 10/19/16  0839   CHOL 210* 205*   HDL 34* 35*    112   TRIG 353* 289*     Recent Labs   Lab Test 08/13/15  1656   A1C 5.9       [place] Risk Calculator:  Risk factor: 34%    DISCUSSION  Chest x-ray does not show any distinct abnormality.  An EKG shows a first-degree AV block and low voltage QRS but no signs of ischemia or other concerns.  Chest pain is concerning  for angina.  Although gradually progressive it has relative stability.  We will proceed with outpatient work-up and obtain a nuclear stress test.  Vascular disease risk calculated at 34%.  He is able to exercise on a treadmill.  We will check additional labs as noted.  Prescription for nitroglycerin with instructions on proper use.  Recommend starting baby aspirin at this time.  Recommend emergent evaluation if there is any significant change in symptoms.    Obtain additional labs to work-up leg cramps to determine if there is a metabolic cause.  Discussed basic prevention of leg cramps.  See additional discussion below.  Follow-up based on stress testing and clinical course in the  short-term.  Subjective:     HPI:    Nando Carreno is a 71 year old male is here today with his wife to discuss several considerations.  Patient brings up that he has been having chest pain for at least 2 months.  This is intermittent but occurring at most daily.  It occurs with exertion.  He reports that it typically occurs with doing yard work and sometimes walking up steps.  The pain always subsides after a few minutes and especially he takes deep breaths.  Patient feels that he gets some sensation of palpitations and also feels somewhat weak when this is occurring.  The symptoms have been relatively predictable with activity.  They have not occurred at any other time.  He does not otherwise feel weak short of breath lightheaded or have any distinct concern for palpitations beyond the specific episodes.  He has no history of heart disease or lung disease.  He does not take baby aspirin but is on pravastatin at a low dose.  He has no history of hypertension.  No known family history of vascular disease.  He reports symptoms seem to be occurring more easily with activity than in the past but this has been a gradual progression.    Is a longstanding history of leg cramps.  Has a complex history of spine issues including surgery which have caused a neurologic considerations into the feet and legs.  He reports almost every morning he gets cramps in his ankles and feet which take about 30 minutes to loosen up before he can function normally.  He states he remains well-hydrated.  He has not changed medications or done anything different other than he notes he is wearing flip-flops more often.    There is also an inquiry today about PSA testing and prostate cancer screening.  He has not had this testing done on a routine basis.  We spent some time discussing prostate cancer screening especially acknowledging concerns for harm related to false positive test results and potential for considerations related to  unnecessary treatment of prostate cancer.  Based on potential for harm he elects to not pursue this testing at this time.    ROS:  Complete review of systems is obtained.  Other than the specific considerations noted above complete review of systems is negative.          Objective:   Medications:  Current Outpatient Medications   Medication     albuterol (PROAIR HFA/PROVENTIL HFA/VENTOLIN HFA) 108 (90 Base) MCG/ACT inhaler     amoxicillin (AMOXIL) 500 MG capsule     b complex vitamins tablet     gabapentin (NEURONTIN) 100 MG capsule     glucosamine-chondroitin 500-400 mg cap     ibuprofen (ADVIL,MOTRIN) 200 MG tablet     meclizine (ANTIVERT) 25 mg tablet     omeprazole (PRILOSEC) 20 MG capsule     pravastatin (PRAVACHOL) 10 MG tablet     No current facility-administered medications for this visit.        Allergies:     Allergies   Allergen Reactions     Statins-Hmg-Coa Reductase Inhibitors [Hmg-Coa-R Inhibitors] Muscle Pain (Myalgia)     Niacin Rash     Rash all over with high dose of niacin        Social History     Socioeconomic History     Marital status:      Spouse name: Not on file     Number of children: Not on file     Years of education: Not on file     Highest education level: Not on file   Occupational History     Not on file   Tobacco Use     Smoking status: Former Smoker     Quit date: 1994     Years since quittin.6     Smokeless tobacco: Never Used   Substance and Sexual Activity     Alcohol use: Yes     Comment: Alcoholic Drinks/day: occasionally      Drug use: No     Sexual activity: Not on file   Other Topics Concern     Parent/sibling w/ CABG, MI or angioplasty before 65F 55M? Not Asked   Social History Narrative     Not on file     Social Determinants of Health     Financial Resource Strain:      Difficulty of Paying Living Expenses:    Food Insecurity:      Worried About Running Out of Food in the Last Year:      Ran Out of Food in the Last Year:    Transportation Needs:      Lack  of Transportation (Medical):      Lack of Transportation (Non-Medical):    Physical Activity:      Days of Exercise per Week:      Minutes of Exercise per Session:    Stress:      Feeling of Stress :    Social Connections:      Frequency of Communication with Friends and Family:      Frequency of Social Gatherings with Friends and Family:      Attends [employer] Services:      Active Member of Clubs or Organizations:      Attends Club or Organization Meetings:      Marital Status:    Intimate Partner Violence:      Fear of Current or Ex-Partner:      Emotionally Abused:      Physically Abused:      Sexually Abused:        Family History   Problem Relation Age of Onset     Dementia Father      Colon Cancer Sister      Anesthesia Reaction No family hx of         Most Recent Immunizations   Administered Date(s) Administered     COVID-19,PF,Moderna 05/10/2021     Influenza (High Dose) 3 valent vaccine 10/09/2019     Influenza (IIV3) PF 09/29/2014     Influenza Vaccine, 6+MO IM (QUADRIVALENT W/PRESERVATIVES) 09/29/2014     Influenza, Quad, High Dose, Pf, 65yr + 09/23/2020     Pneumo Conj 13-V (2010&after) 10/19/2016     Pneumococcal 23 valent 05/11/2018     Tdap (Adacel,Boostrix) 05/11/2018        Wt Readings from Last 3 Encounters:   08/24/21 88 kg (194 lb)   08/20/21 87.5 kg (193 lb)   11/12/20 90.8 kg (200 lb 1.6 oz)        BP Readings from Last 6 Encounters:   08/24/21 132/66   08/20/21 (!) 151/88   11/12/20 124/72   09/23/20 132/76   07/20/20 122/72   10/09/19 112/80        Hemoglobin A1C   Date Value Ref Range Status   08/13/2015 5.9 4.2 - 6.1 % Final              PHYSICAL EXAM:    /66   Pulse 82   Wt 88 kg (194 lb)   SpO2 98%   BMI 27.25 kg/m           General Appearance:    Alert, cooperative, no distress   Eyes:   No scleral icterus or conjunctival irritation       Throat:   Lips, mucosa, and tongue normal; teeth and gums normal   Neck:   Supple, symmetrical, trachea midline, no adenopathy;         thyroid:  No enlargement/tenderness/nodules   Lungs:     Clear to auscultation bilaterally, respirations unlabored, no wheezes or crackles   Heart:    Regular rate and rhythm,  No murmur   Abdomen:    Soft, no distention, no tenderness on palpation, no masses, no organomegaly     Extremities:  No edema, no joint swelling or redness, no evidence of any injuries, distinctly palpable dorsalis pedis and posterior tibialis pulses.   Skin:  No concerning skin findings, no suspicious moles, no rashes   Neurologic:  On gross examination there is no motor or sensory deficit.  Patient walks with a normal gait, there is subjective decreased sensation in the first toe on the left foot and the most toes and feet on the right foot.  This seems to be more chronic considerations without any acute finding.  Reflexes are symmetric there are +1 at the patella and barely elicitable at the Achilles but symmetric between the right and left.  No evidence of any distinct strength deficit.

## 2021-09-08 ENCOUNTER — HOSPITAL ENCOUNTER (OUTPATIENT)
Dept: NUCLEAR MEDICINE | Facility: CLINIC | Age: 71
End: 2021-09-08
Attending: FAMILY MEDICINE
Payer: COMMERCIAL

## 2021-09-08 ENCOUNTER — HOSPITAL ENCOUNTER (OUTPATIENT)
Dept: CARDIOLOGY | Facility: CLINIC | Age: 71
End: 2021-09-08
Attending: FAMILY MEDICINE
Payer: COMMERCIAL

## 2021-09-08 DIAGNOSIS — R07.9 CHEST PAIN, UNSPECIFIED TYPE: ICD-10-CM

## 2021-09-08 LAB
CV STRESS CURRENT BP HE: NORMAL
CV STRESS CURRENT HR HE: 100
CV STRESS CURRENT HR HE: 103
CV STRESS CURRENT HR HE: 104
CV STRESS CURRENT HR HE: 105
CV STRESS CURRENT HR HE: 106
CV STRESS CURRENT HR HE: 106
CV STRESS CURRENT HR HE: 112
CV STRESS CURRENT HR HE: 114
CV STRESS CURRENT HR HE: 124
CV STRESS CURRENT HR HE: 124
CV STRESS CURRENT HR HE: 126
CV STRESS CURRENT HR HE: 134
CV STRESS CURRENT HR HE: 134
CV STRESS CURRENT HR HE: 135
CV STRESS CURRENT HR HE: 82
CV STRESS CURRENT HR HE: 89
CV STRESS CURRENT HR HE: 98
CV STRESS DEVIATION TIME HE: NORMAL
CV STRESS ECHO PERCENT HR HE: NORMAL
CV STRESS EXERCISE STAGE HE: NORMAL
CV STRESS EXERCISE STAGE REACHED HE: NORMAL
CV STRESS FINAL RESTING BP HE: NORMAL
CV STRESS FINAL RESTING HR HE: 100
CV STRESS MAX HR HE: 134
CV STRESS MAX TREADMILL GRADE HE: 12
CV STRESS MAX TREADMILL SPEED HE: 2.5
CV STRESS PEAK DIA BP HE: NORMAL
CV STRESS PEAK SYS BP HE: NORMAL
CV STRESS PHASE HE: NORMAL
CV STRESS PROTOCOL HE: NORMAL
CV STRESS RESTING PT POSITION HE: NORMAL
CV STRESS RESTING PT POSITION HE: NORMAL
CV STRESS ST DEVIATION AMOUNT HE: NORMAL
CV STRESS ST DEVIATION ELEVATION HE: NORMAL
CV STRESS ST EVELATION AMOUNT HE: NORMAL
CV STRESS TEST TYPE HE: NORMAL
CV STRESS TOTAL STAGE TIME MIN 1 HE: NORMAL
NUC STRESS EJECTION FRACTION: 67 %
RATE PRESSURE PRODUCT: NORMAL
STRESS ECHO BASELINE DIASTOLIC HE: 82
STRESS ECHO BASELINE HR: 80
STRESS ECHO BASELINE SYSTOLIC BP: 132
STRESS ECHO CALCULATED PERCENT HR: 91 %
STRESS ECHO LAST STRESS DIASTOLIC BP: 66
STRESS ECHO LAST STRESS HR: 134
STRESS ECHO LAST STRESS SYSTOLIC BP: 142
STRESS ECHO POST ESTIMATED WORKLOAD: 7
STRESS ECHO POST EXERCISE DUR MIN: 5
STRESS ECHO POST EXERCISE DUR SEC: 0
STRESS ECHO TARGET HR: 148

## 2021-09-08 PROCEDURE — 78452 HT MUSCLE IMAGE SPECT MULT: CPT

## 2021-09-08 PROCEDURE — 343N000001 HC RX 343: Performed by: FAMILY MEDICINE

## 2021-09-08 PROCEDURE — 93018 CV STRESS TEST I&R ONLY: CPT | Performed by: INTERNAL MEDICINE

## 2021-09-08 PROCEDURE — 93016 CV STRESS TEST SUPVJ ONLY: CPT | Performed by: INTERNAL MEDICINE

## 2021-09-08 PROCEDURE — 78452 HT MUSCLE IMAGE SPECT MULT: CPT | Mod: 26 | Performed by: INTERNAL MEDICINE

## 2021-09-08 PROCEDURE — A9500 TC99M SESTAMIBI: HCPCS | Performed by: FAMILY MEDICINE

## 2021-09-08 RX ADMIN — Medication 8.6 MILLICURIE: at 07:40

## 2021-09-08 RX ADMIN — Medication 33 MILLICURIE: at 08:30

## 2021-09-27 ENCOUNTER — OFFICE VISIT (OUTPATIENT)
Dept: FAMILY MEDICINE | Facility: CLINIC | Age: 71
End: 2021-09-27
Payer: COMMERCIAL

## 2021-09-27 VITALS
HEART RATE: 85 BPM | BODY MASS INDEX: 27.53 KG/M2 | SYSTOLIC BLOOD PRESSURE: 116 MMHG | WEIGHT: 196 LBS | DIASTOLIC BLOOD PRESSURE: 72 MMHG | OXYGEN SATURATION: 94 %

## 2021-09-27 DIAGNOSIS — R25.2 LEG CRAMPS: ICD-10-CM

## 2021-09-27 DIAGNOSIS — E78.5 HYPERLIPIDEMIA LDL GOAL <100: ICD-10-CM

## 2021-09-27 DIAGNOSIS — R07.9 CHEST PAIN, UNSPECIFIED TYPE: Primary | ICD-10-CM

## 2021-09-27 PROCEDURE — 99214 OFFICE O/P EST MOD 30 MIN: CPT | Mod: 25 | Performed by: FAMILY MEDICINE

## 2021-09-27 PROCEDURE — G0008 ADMIN INFLUENZA VIRUS VAC: HCPCS | Performed by: FAMILY MEDICINE

## 2021-09-27 PROCEDURE — 90662 IIV NO PRSV INCREASED AG IM: CPT | Performed by: FAMILY MEDICINE

## 2021-10-27 ENCOUNTER — LAB (OUTPATIENT)
Dept: LAB | Facility: CLINIC | Age: 71
End: 2021-10-27
Payer: COMMERCIAL

## 2021-10-27 DIAGNOSIS — E78.5 HYPERLIPIDEMIA LDL GOAL <100: Primary | ICD-10-CM

## 2021-10-27 LAB
CHOLEST SERPL-MCNC: 247 MG/DL
FASTING STATUS PATIENT QL REPORTED: YES
HDLC SERPL-MCNC: 37 MG/DL
LDLC SERPL CALC-MCNC: 161 MG/DL
LDLC SERPL CALC-MCNC: ABNORMAL MG/DL
PSA SERPL-MCNC: 1.39 UG/L (ref 0–6.5)
TRIGL SERPL-MCNC: 421 MG/DL

## 2021-10-27 PROCEDURE — 83721 ASSAY OF BLOOD LIPOPROTEIN: CPT | Mod: 59 | Performed by: FAMILY MEDICINE

## 2021-10-27 PROCEDURE — G0103 PSA SCREENING: HCPCS | Performed by: FAMILY MEDICINE

## 2021-10-27 PROCEDURE — 80061 LIPID PANEL: CPT | Performed by: FAMILY MEDICINE

## 2021-10-27 PROCEDURE — 36415 COLL VENOUS BLD VENIPUNCTURE: CPT | Performed by: FAMILY MEDICINE

## 2021-11-09 ENCOUNTER — OFFICE VISIT (OUTPATIENT)
Dept: FAMILY MEDICINE | Facility: CLINIC | Age: 71
End: 2021-11-09
Payer: COMMERCIAL

## 2021-11-09 VITALS
SYSTOLIC BLOOD PRESSURE: 120 MMHG | HEART RATE: 80 BPM | BODY MASS INDEX: 27.92 KG/M2 | DIASTOLIC BLOOD PRESSURE: 60 MMHG | WEIGHT: 198.8 LBS

## 2021-11-09 DIAGNOSIS — E78.5 HYPERLIPIDEMIA, UNSPECIFIED HYPERLIPIDEMIA TYPE: ICD-10-CM

## 2021-11-09 PROCEDURE — 99213 OFFICE O/P EST LOW 20 MIN: CPT | Performed by: FAMILY MEDICINE

## 2021-11-09 RX ORDER — PRAVASTATIN SODIUM 10 MG
TABLET ORAL
Qty: 45 TABLET | Refills: 2 | Status: SHIPPED | OUTPATIENT
Start: 2021-11-09 | End: 2022-01-13 | Stop reason: SINTOL

## 2021-11-09 NOTE — PROGRESS NOTES
Nando Carreno  /60 (BP Location: Left arm, Patient Position: Sitting, Cuff Size: Adult Large)   Pulse 80   Wt 90.2 kg (198 lb 12.8 oz)   BMI 27.92 kg/m       Assessment/Plan:                Nando was seen today for recheck.    Diagnoses and all orders for this visit:    Hyperlipidemia, unspecified hyperlipidemia type  -     pravastatin (PRAVACHOL) 10 MG tablet; [PRAVASTATIN (PRAVACHOL) 10 MG TABLET] TAKE 1 TABLET BY MOUTH EVERY OTHER DAY AT BEDTIME  -     coenzyme Q10 (CO-Q10) 30 MG capsule; Take 1 capsule (30 mg) by mouth daily         DISCUSSION  See discussion below.  Subjective:     HPI:    Nando Carreno is a 71 year old male with hyperlipidemia.  Has had LDL cholesterols greater than 180.  Has been on pravastatin 10 mg every other day because of previous reactions to statins which have included full body aches and more recently concerns for leg cramps.  Patient had discontinued his pravastatin when he developed persistent leg cramps and his leg cramps resolved and have stayed away.  We discussed his elevated readings obtained recently.  This confers a higher vascular disease risk.  Discussed considerations related to reattempting statin therapy.  He had previously been on Crestor, atorvastatin and he believes others.  His most recent pravastatin was fairly well-tolerated until he developed a leg cramp concern.  He had heard about co-Q10 and how it might reduce the chance of adverse reactions while taking statins and he would like to try this.  We discussed coadministration of co-Q10 with restarting his pravastatin 10 mg every other day.    ROS:  Complete review of systems is obtained.  Other than the specific considerations noted above complete review of systems is negative.    Objective:   Medications:  Current Outpatient Medications   Medication     albuterol (PROAIR HFA/PROVENTIL HFA/VENTOLIN HFA) 108 (90 Base) MCG/ACT inhaler     b complex vitamins tablet     coenzyme Q10 (CO-Q10) 30 MG  capsule     gabapentin (NEURONTIN) 100 MG capsule     glucosamine-chondroitin 500-400 mg cap     ibuprofen (ADVIL,MOTRIN) 200 MG tablet     meclizine (ANTIVERT) 25 mg tablet     omeprazole (PRILOSEC) 20 MG capsule     pravastatin (PRAVACHOL) 10 MG tablet     amoxicillin (AMOXIL) 500 MG capsule     aspirin (ASA) 81 MG EC tablet     nitroGLYcerin (NITROSTAT) 0.4 MG sublingual tablet     No current facility-administered medications for this visit.        Allergies:     Allergies   Allergen Reactions     Statins-Hmg-Coa Reductase Inhibitors [Hmg-Coa-R Inhibitors] Muscle Pain (Myalgia)        Social History     Socioeconomic History     Marital status:      Spouse name: Not on file     Number of children: Not on file     Years of education: Not on file     Highest education level: Not on file   Occupational History     Not on file   Tobacco Use     Smoking status: Former Smoker     Quit date: 1994     Years since quittin.8     Smokeless tobacco: Never Used   Substance and Sexual Activity     Alcohol use: Yes     Comment: Alcoholic Drinks/day: occasionally      Drug use: No     Sexual activity: Not on file   Other Topics Concern     Parent/sibling w/ CABG, MI or angioplasty before 65F 55M? Not Asked   Social History Narrative     Not on file     Social Determinants of Health     Financial Resource Strain: Not on file   Food Insecurity: Not on file   Transportation Needs: Not on file   Physical Activity: Not on file   Stress: Not on file   Social Connections: Not on file   Intimate Partner Violence: Not on file   Housing Stability: Not on file       Family History   Problem Relation Age of Onset     Dementia Father      Colon Cancer Sister      Anesthesia Reaction No family hx of         Most Recent Immunizations   Administered Date(s) Administered     COVID-19,PF,Moderna 05/10/2021     Influenza (High Dose) 3 valent vaccine 10/09/2019     Influenza (IIV3) PF 2014     Influenza Vaccine, 6+MO IM  (QUADRIVALENT W/PRESERVATIVES) 09/29/2014     Influenza, Quad, High Dose, Pf, 65yr+ (Fluzone HD) 09/27/2021     Pneumo Conj 13-V (2010&after) 10/19/2016     Pneumococcal 23 valent 05/11/2018     Tdap (Adacel,Boostrix) 05/11/2018        Wt Readings from Last 3 Encounters:   11/09/21 90.2 kg (198 lb 12.8 oz)   09/27/21 88.9 kg (196 lb)   08/24/21 88 kg (194 lb)        BP Readings from Last 6 Encounters:   11/09/21 120/60   09/27/21 116/72   08/24/21 132/66   08/20/21 (!) 151/88   11/12/20 124/72   09/23/20 132/76      Hemoglobin A1C   Date Value Ref Range Status   08/13/2015 5.9 4.2 - 6.1 % Final      PHYSICAL EXAM:    /60 (BP Location: Left arm, Patient Position: Sitting, Cuff Size: Adult Large)   Pulse 80   Wt 90.2 kg (198 lb 12.8 oz)   BMI 27.92 kg/m         Answers for HPI/ROS submitted by the patient on 11/9/2021  Are you regularly taking any medication or supplement to lower your cholesterol?: No  Are you having muscle aches or other side effects that you think could be caused by your cholesterol lowering medication?: Yes  How many servings of fruits and vegetables do you eat daily?: 0-1  On average, how many sweetened beverages do you drink each day (Examples: soda, juice, sweet tea, etc.  Do NOT count diet or artificially sweetened beverages)?: 0  How many minutes a day do you exercise enough to make your heart beat faster?: 9 or less  How many days a week do you exercise enough to make your heart beat faster?: 3 or less  How many days per week do you miss taking your medication?: 0

## 2021-11-10 RX ORDER — PRAVASTATIN SODIUM 10 MG
TABLET ORAL
Qty: 45 TABLET | OUTPATIENT
Start: 2021-11-10

## 2021-11-12 ENCOUNTER — ALLIED HEALTH/NURSE VISIT (OUTPATIENT)
Dept: FAMILY MEDICINE | Facility: CLINIC | Age: 71
End: 2021-11-12
Payer: COMMERCIAL

## 2021-11-12 DIAGNOSIS — Z23 COVID-19 VACCINE ADMINISTERED: Primary | ICD-10-CM

## 2021-11-12 PROCEDURE — 99207 PR NO CHARGE NURSE ONLY: CPT

## 2021-11-12 PROCEDURE — 0064A COVID-19,PF,MODERNA (18+ YRS BOOSTER .25ML): CPT

## 2021-11-12 PROCEDURE — 91306 COVID-19,PF,MODERNA (18+ YRS BOOSTER .25ML): CPT

## 2021-12-01 ENCOUNTER — OFFICE VISIT (OUTPATIENT)
Dept: FAMILY MEDICINE | Facility: CLINIC | Age: 71
End: 2021-12-01
Payer: COMMERCIAL

## 2021-12-01 VITALS
BODY MASS INDEX: 27.95 KG/M2 | OXYGEN SATURATION: 96 % | HEART RATE: 91 BPM | SYSTOLIC BLOOD PRESSURE: 128 MMHG | WEIGHT: 199 LBS | DIASTOLIC BLOOD PRESSURE: 79 MMHG

## 2021-12-01 DIAGNOSIS — M54.42 ACUTE RIGHT-SIDED LOW BACK PAIN WITH LEFT-SIDED SCIATICA: Primary | ICD-10-CM

## 2021-12-01 DIAGNOSIS — J40 BRONCHITIS: ICD-10-CM

## 2021-12-01 PROCEDURE — 99214 OFFICE O/P EST MOD 30 MIN: CPT | Performed by: FAMILY MEDICINE

## 2021-12-01 RX ORDER — ALBUTEROL SULFATE 90 UG/1
AEROSOL, METERED RESPIRATORY (INHALATION)
Qty: 8.5 G | Refills: 3 | Status: SHIPPED | OUTPATIENT
Start: 2021-12-01

## 2021-12-01 RX ORDER — AZITHROMYCIN 250 MG/1
TABLET, FILM COATED ORAL
Qty: 6 TABLET | Refills: 0 | Status: SHIPPED | OUTPATIENT
Start: 2021-12-01 | End: 2021-12-06

## 2021-12-01 RX ORDER — CYCLOBENZAPRINE HCL 10 MG
10 TABLET ORAL 3 TIMES DAILY PRN
Qty: 30 TABLET | Refills: 0 | Status: SHIPPED | OUTPATIENT
Start: 2021-12-01 | End: 2022-08-03

## 2021-12-01 NOTE — PROGRESS NOTES
"  Assessment & Plan     Bronchitis  Refill the following bronchodilator we will institute therapy with the following antibiotic  - albuterol (PROAIR HFA/PROVENTIL HFA/VENTOLIN HFA) 108 (90 Base) MCG/ACT inhaler  Dispense: 8.5 g; Refill: 3  - azithromycin (ZITHROMAX) 250 MG tablet  Dispense: 6 tablet; Refill: 0    Acute right-sided low back pain with left-sided sciatica  Patient has paravertebral muscle spasm right side predominant with some mild sciatic discomfort  - cyclobenzaprine (FLEXERIL) 10 MG tablet  Dispense: 30 tablet; Refill: 0               BMI:   Estimated body mass index is 27.95 kg/m  as calculated from the following:    Height as of 8/20/21: 1.797 m (5' 10.75\").    Weight as of this encounter: 90.3 kg (199 lb).           No follow-ups on file.    Misael Costa MD  [facility] KAHLIL Zepeda is a 71 year old who presents for the following health issues     HPI Zeeshan developed a sore throat for the last 2 to 3 days and his asthma is getting reexacerbated using he had some increased wheezing.  He is afebrile antibiotics have helped him in the past.  We did renew his bronchodilator here.  His exam was consistent with a mild pharyngitis.  He has had Covid in the past and has been immunized he does not feel ill or have any other Covid-like symptoms.  Impression is that of an early bronchitis we will treat him with azithromycin.  He had a slight lower back discomfort yesterday when he twisted the wrong way he is complaining of some acute left lower lumbar discomfort I can palpate some spasm in that area we will treat him with an antispasmodic he can take some Advil or Tylenol along with these other medications.  Time will tell her success he will let me know if he fails to improve.  He and his wife have long been patients of mine they are on their way to [place] shortly.          Review of Systems   Constitutional, HEENT, cardiovascular, pulmonary, gi and gu systems are negative, " except as otherwise noted.      Objective    /79 (BP Location: Left arm, Patient Position: Sitting, Cuff Size: Adult Large)   Pulse 91   Wt 90.3 kg (199 lb)   SpO2 96%   BMI 27.95 kg/m    Body mass index is 27.95 kg/m .  Physical Exam   GENERAL: healthy, alert and no distress  NECK: no adenopathy, no asymmetry, masses, or scars and thyroid normal to palpation  RESP: lungs clear to auscultation - no rales, rhonchi or wheezes  CV: regular rate and rhythm, normal S1 S2, no S3 or S4, no murmur, click or rub, no peripheral edema and peripheral pulses strong  ABDOMEN: soft, nontender, no hepatosplenomegaly, no masses and bowel sounds normal  MS: no gross musculoskeletal defects noted, no edema  Neurological exam-patient has paravertebral muscle spasm left lower lumbar region without any radiation or DTR abnormalities

## 2022-01-13 ENCOUNTER — TELEPHONE (OUTPATIENT)
Dept: FAMILY MEDICINE | Facility: CLINIC | Age: 72
End: 2022-01-13
Payer: COMMERCIAL

## 2022-01-13 DIAGNOSIS — E78.5 HYPERLIPIDEMIA, UNSPECIFIED HYPERLIPIDEMIA TYPE: Primary | ICD-10-CM

## 2022-01-13 RX ORDER — EZETIMIBE 10 MG/1
10 TABLET ORAL DAILY
Qty: 90 TABLET | Refills: 3 | Status: CANCELLED | OUTPATIENT
Start: 2022-01-13

## 2022-01-13 NOTE — TELEPHONE ENCOUNTER
Spoke with patient. They would like to try Zetia. I set up for you to [place] pharmacy. They said thank you very much.

## 2022-01-13 NOTE — TELEPHONE ENCOUNTER
I recommend based on his history that he stop the pravastatin. I would not suggest a new statin, he has tried others better less likely to cause problems already. We will place him on a medication called Zetia and have him take 10 mg daily. This is not a statin but does help lower cholesterol. I just need to know what pharmacy he would like it sent to in [place].

## 2022-01-13 NOTE — TELEPHONE ENCOUNTER
Reason for Call:  Other call back    Detailed comments: Recvd merissa from wife/Mae regarding pt/Duane, he is on RX pravastatin which is causing him severe cramps in both legs mostly at night and once in a while throughout the day.    They are in [place] until April 1st and is wondering if a new cholesterol medication can be called in.    Please reach out to Mae and Duane if Dr Misael Costa is OK with a change in cholesterol RX    Phone Number Patient can be reached at: Cell number on file:    Telephone Information:   Mobile [phone]       Best Time: anytime    Can we leave a detailed message on this number? YES    Call taken on 1/13/2022 at 9:56 AM by Aidee Flynn

## 2022-02-04 ENCOUNTER — VIRTUAL VISIT (OUTPATIENT)
Dept: FAMILY MEDICINE | Facility: CLINIC | Age: 72
End: 2022-02-04
Payer: COMMERCIAL

## 2022-02-04 DIAGNOSIS — R25.2 LEG CRAMPS: ICD-10-CM

## 2022-02-04 DIAGNOSIS — Z78.9 STATIN INTOLERANCE: ICD-10-CM

## 2022-02-04 DIAGNOSIS — E78.5 HYPERLIPIDEMIA LDL GOAL <100: Primary | ICD-10-CM

## 2022-02-04 PROCEDURE — 99213 OFFICE O/P EST LOW 20 MIN: CPT | Mod: GT | Performed by: FAMILY MEDICINE

## 2022-02-04 NOTE — PROGRESS NOTES
Duane is a 71 year old who is being evaluated via a billable video visit.      How would you like to obtain your AVS? MyChart  If the video visit is dropped, the invitation should be resent by: Text to cell phone: [phone]  Will anyone else be joining your video visit? No      Video Start Time: Not applicable converted to phone visit.    Nando was seen today for extremity weakness.    Diagnoses and all orders for this visit:    Hyperlipidemia LDL goal <100  -     Adult Cardiology Eval Referral; Future    Statin intolerance  -     Adult Cardiology Eval Referral; Future    Leg cramps  -     Adult Cardiology Eval Referral; Future       See below    Subjective   Duane is a 71 year old with high cholesterol.  Has been on statins most recently including pravastatin and Crestor.  He is taking medications with co-Q10 and at lower doses and more infrequent intervals despite this tends to get leg cramps easily which are debilitating and he cannot continue to take the medication.  Leg cramps have resolved upon cessation.  Recently tried Zetia 10 mg which also resulted in leg cramps.  Stop the medication and as with prior concerns symptoms improved.  Discussed referral to cardiology to help decide on whether further testing and/or other treatment options such as possibly Repatha would be in order for his situation.  He will not return from [place] until April I think this is reasonable.  Reviewed with him again dietary considerations for help in managing cholesterol and an overall healthy lifestyle.        Review of Systems   Complete review of systems is obtained.  Other than the specific considerations noted above complete review of systems is negative.      Objective           Vitals:  No vitals were obtained today due to virtual visit.    Physical Exam   Patient sounds to be in no distress.  He is not exhibiting any signs that are audible over the telephone that would suggest he is having any breathing difficulty or other  distressing concerns.              Total time spent on this telephone visit 10 minutes

## 2022-04-07 ENCOUNTER — HOSPITAL ENCOUNTER (OUTPATIENT)
Dept: ULTRASOUND IMAGING | Facility: CLINIC | Age: 72
Discharge: HOME | End: 2022-04-07
Attending: PHYSICIAN ASSISTANT | Admitting: PHYSICIAN ASSISTANT
Payer: COMMERCIAL

## 2022-04-07 DIAGNOSIS — M79.662 PAIN AND SWELLING OF LEFT LOWER LEG: ICD-10-CM

## 2022-04-07 DIAGNOSIS — M79.89 PAIN AND SWELLING OF LEFT LOWER LEG: ICD-10-CM

## 2022-04-07 PROCEDURE — 93971 EXTREMITY STUDY: CPT | Mod: LT

## 2022-04-20 ENCOUNTER — OFFICE VISIT (OUTPATIENT)
Dept: FAMILY MEDICINE | Facility: CLINIC | Age: 72
End: 2022-04-20
Payer: COMMERCIAL

## 2022-04-20 VITALS
OXYGEN SATURATION: 97 % | HEART RATE: 86 BPM | BODY MASS INDEX: 28.09 KG/M2 | SYSTOLIC BLOOD PRESSURE: 130 MMHG | WEIGHT: 200 LBS | DIASTOLIC BLOOD PRESSURE: 88 MMHG

## 2022-04-20 DIAGNOSIS — R25.2 LEG CRAMPS: ICD-10-CM

## 2022-04-20 DIAGNOSIS — E78.1 HYPERTRIGLYCERIDEMIA: Primary | ICD-10-CM

## 2022-04-20 PROCEDURE — 99214 OFFICE O/P EST MOD 30 MIN: CPT | Performed by: FAMILY MEDICINE

## 2022-04-20 NOTE — PROGRESS NOTES
Assessment & Plan     Hypertriglyceridemia  Patient cannot tolerate statins or Zetia; we will put him on an omega-3 regimen to capsules daily in addition to eating salmon which she loves and we will repeat his lipid profile in 4 months beginning the diet today    Leg cramps  Patient got severe leg cramps from statins as well as from Zetia; we did discuss injectables for LDL but these are really cross prohibitive and his lipids are really not that severe; we will try diet as per above                 No follow-ups on file.    Misael Costa MD  [facility] is a 71 year old who presents for the following health issues patient returns from [place] he has a past medical history of elevated triglycerides and elevated LDLs.  He gets severe leg cramps from statins as well as from Zetia.  He is discontinued those.  We did discuss a diet consisting of fish oil omega-3 fatty acid rich dietary foods such as salmon etc. and OTC fish oil capsules/omega-3 fatty acid capsules 2/day; at that time we will repeat his lipid profile.  I have deferred on getting a lipid profile here today as I have reviewed all of his findings from the past few years and they have been consistently elevated his LDL stays around 140.  We did discuss injectable pharmaceuticals but these are cost prohibitive at this point    HPI           Review of Systems   Constitutional, HEENT, cardiovascular, pulmonary, gi and gu systems are negative, except as otherwise noted.      Objective    /88   Pulse 86   Wt 90.7 kg (200 lb)   SpO2 97%   BMI 28.09 kg/m    Body mass index is 28.09 kg/m .  Physical Exam   GENERAL: healthy, alert and no distress  NECK: no adenopathy, no asymmetry, masses, or scars and thyroid normal to palpation  RESP: lungs clear to auscultation - no rales, rhonchi or wheezes  CV: regular rate and rhythm, normal S1 S2, no S3 or S4, no murmur, click or rub, no peripheral edema and peripheral  pulses strong  ABDOMEN: soft, nontender, no hepatosplenomegaly, no masses and bowel sounds normal  MS: no gross musculoskeletal defects noted, no edema

## 2022-05-24 ENCOUNTER — IMMUNIZATION (OUTPATIENT)
Dept: NURSING | Facility: CLINIC | Age: 72
End: 2022-05-24
Payer: COMMERCIAL

## 2022-05-24 PROCEDURE — 91306 COVID-19,PF,MODERNA (18+ YRS BOOSTER .25ML): CPT

## 2022-05-24 PROCEDURE — 0064A COVID-19,PF,MODERNA (18+ YRS BOOSTER .25ML): CPT

## 2022-07-17 ENCOUNTER — HEALTH MAINTENANCE LETTER (OUTPATIENT)
Age: 72
End: 2022-07-17

## 2022-07-27 DIAGNOSIS — M54.2 CERVICAL SPINE PAIN: ICD-10-CM

## 2022-07-27 DIAGNOSIS — M47.812 ARTHROPATHY OF CERVICAL FACET JOINT: ICD-10-CM

## 2022-07-27 DIAGNOSIS — R20.2 RIGHT HAND PARESTHESIA: ICD-10-CM

## 2022-07-27 RX ORDER — GABAPENTIN 100 MG/1
CAPSULE ORAL
Qty: 90 CAPSULE | Refills: 0 | Status: SHIPPED | OUTPATIENT
Start: 2022-07-27 | End: 2023-07-10

## 2022-08-03 ENCOUNTER — OFFICE VISIT (OUTPATIENT)
Dept: FAMILY MEDICINE | Facility: CLINIC | Age: 72
End: 2022-08-03
Payer: COMMERCIAL

## 2022-08-03 VITALS
BODY MASS INDEX: 27.72 KG/M2 | WEIGHT: 198 LBS | DIASTOLIC BLOOD PRESSURE: 76 MMHG | SYSTOLIC BLOOD PRESSURE: 126 MMHG | OXYGEN SATURATION: 97 % | HEIGHT: 71 IN | HEART RATE: 74 BPM | RESPIRATION RATE: 18 BRPM

## 2022-08-03 DIAGNOSIS — E78.1 HYPERTRIGLYCERIDEMIA: Primary | ICD-10-CM

## 2022-08-03 DIAGNOSIS — M25.562 ARTHRALGIA OF LEFT KNEE: ICD-10-CM

## 2022-08-03 LAB
CHOLEST SERPL-MCNC: 209 MG/DL
HDLC SERPL-MCNC: 33 MG/DL
LDLC SERPL CALC-MCNC: 101 MG/DL
NONHDLC SERPL-MCNC: 176 MG/DL
TRIGL SERPL-MCNC: 374 MG/DL

## 2022-08-03 PROCEDURE — 80061 LIPID PANEL: CPT | Performed by: FAMILY MEDICINE

## 2022-08-03 PROCEDURE — 99214 OFFICE O/P EST MOD 30 MIN: CPT | Performed by: FAMILY MEDICINE

## 2022-08-03 PROCEDURE — 36415 COLL VENOUS BLD VENIPUNCTURE: CPT | Performed by: FAMILY MEDICINE

## 2022-08-03 ASSESSMENT — PAIN SCALES - GENERAL: PAINLEVEL: NO PAIN (0)

## 2022-08-03 NOTE — PROGRESS NOTES
"  Assessment & Plan     Hypertriglyceridemia  Therapeutic options for his high triglycerides were discussed he will take a daily supplement of fish oil we will see what his results are he is doing really quite well with his weight and activity level  - Lipid panel reflex to direct LDL Fasting    Arthralgia of left knee  Patient has arthritis of the compartment of his left knee has some intermittent swelling of his ankle but today he is asymptomatic.  He will continue to use knee protection and continue with aerobic exercise such as walking               BMI:   Estimated body mass index is 27.81 kg/m  as calculated from the following:    Height as of this encounter: 1.797 m (5' 10.75\").    Weight as of this encounter: 89.8 kg (198 lb).           No follow-ups on file.    Misael Costa MD  [facility] HealthcareMORGAN Zepeda is a 72 year old, presenting for the following health issues:  Leg Swelling and Medication Question (Fasting/)      LUZ Kothari is accompanied here by his edwin wife as they have returned to [place] for the summer.  He is enjoyed a good year of health he does have high triglycerides.  We have tried him on various statins including decreasing dosages of various statins with continuing side effect of myalgias.  He is on minimal medications at this point and are plan is to use over-the-counter fish oil capsules on a regular basis for triglyceride control.  Activity is the key and avoidance of excessive carbohydrates as we discussed and he is well aware of.  He does have intermittent discomfort in his left knee he has had a right knee replacement does have compartment arthritis in the left knee which causes some intermittent swelling of his ankle currently he is doing well.  We do not need to add any other medication at this point plan today is for lipid profile to see where were at he will keep up with his annual Medicare physical here in the winter prior to going down to the " "[place] area.  I really enjoyed seeing both of them again here today          Review of Systems   Constitutional, HEENT, cardiovascular, pulmonary, gi and gu systems are negative, except as otherwise noted.      Objective    /76   Pulse 74   Resp 18   Ht 1.797 m (5' 10.75\")   Wt 89.8 kg (198 lb)   SpO2 97%   BMI 27.81 kg/m    Body mass index is 27.81 kg/m .  Physical Exam   GENERAL: healthy, alert and no distress  NECK: no adenopathy, no asymmetry, masses, or scars and thyroid normal to palpation  RESP: lungs clear to auscultation - no rales, rhonchi or wheezes  CV: regular rate and rhythm, normal S1 S2, no S3 or S4, no murmur, click or rub, no peripheral edema and peripheral pulses strong  ABDOMEN: soft, nontender, no hepatosplenomegaly, no masses and bowel sounds normal  MS: no gross musculoskeletal defects noted, no edema    Extremities-no edema at present                .    "

## 2022-09-25 ENCOUNTER — HEALTH MAINTENANCE LETTER (OUTPATIENT)
Age: 72
End: 2022-09-25

## 2022-10-06 ENCOUNTER — VIRTUAL VISIT (OUTPATIENT)
Dept: FAMILY MEDICINE | Facility: CLINIC | Age: 72
End: 2022-10-06
Payer: COMMERCIAL

## 2022-10-06 DIAGNOSIS — U07.1 INFECTION DUE TO 2019 NOVEL CORONAVIRUS: Primary | ICD-10-CM

## 2022-10-06 DIAGNOSIS — R05.1 ACUTE COUGH: ICD-10-CM

## 2022-10-06 DIAGNOSIS — U07.1 CLINICAL DIAGNOSIS OF COVID-19: ICD-10-CM

## 2022-10-06 PROBLEM — K64.9 HEMORRHOIDS: Status: ACTIVE | Noted: 2017-10-31

## 2022-10-06 PROBLEM — D12.0 BENIGN NEOPLASM OF CECUM: Status: ACTIVE | Noted: 2017-11-02

## 2022-10-06 PROBLEM — K63.5 POLYP OF COLON: Status: ACTIVE | Noted: 2017-10-31

## 2022-10-06 PROCEDURE — 99213 OFFICE O/P EST LOW 20 MIN: CPT | Mod: CS | Performed by: FAMILY MEDICINE

## 2022-10-06 RX ORDER — BUDESONIDE AND FORMOTEROL FUMARATE DIHYDRATE 80; 4.5 UG/1; UG/1
AEROSOL RESPIRATORY (INHALATION)
COMMUNITY
Start: 2022-08-29

## 2022-10-06 RX ORDER — PREDNISONE 20 MG/1
TABLET ORAL
COMMUNITY
Start: 2022-10-04 | End: 2022-12-09 | Stop reason: ALTCHOICE

## 2022-10-06 NOTE — PATIENT INSTRUCTIONS
COVID-19 Outpatient Treatments  Your care team can help you find the best treatments for COVID-19. Talk to a health care provider or refer to the FDA medicine fact sheets below.    Important: You CAN'T have molnupiravir or Paxlovid if you are starting the medicine more than 5 days after your symptoms have started.  Paxlovid: https://www.fda.gov/media/419934/download  Molnupiravir: https://www.fda.gov/media/332546/download  Monoclonal antibodies: https://combatcovid.hhs.gov/what-are-monoclonal-antibodies  Paxlovid (nimatrelvir and ritonavir)  How it works  Two medicines (nirmatrelvir and ritonavir) are taken together. They stop the virus from growing. Less amount of virus is easier for your body to fight.  Benefits  Lowers risk of a hospital stay or death from COVID-19.  How to take    Medicine comes in a daily container with both medicine tablets. Take by mouth twice daily (once in the morning, once at night) for 5 days.    The number of tablets to take varies by patient.    Don't chew or break capsules. Swallow whole.  When to take  Take as soon as possible after positive COVID-19 test result, and within 5 days of your first symptoms.  Who can take it  Patients must be 12 years or older, weigh at least 88 pounds, and have tested positive for COVID-19. This is the preferred treatment for pregnant patients.  Possible side effects  Can cause altered sense of taste, diarrhea (loose, watery stools), high blood pressure, muscle aches.  Medicine conflicts    Some medicines may conflict with Paxlovid and may cause serious side effects.    Tell your care team about all the medicines you take, including prescription and over-the-counter medicines, vitamins and herbal supplements.    Your provider will review your medicines to make sure that you can safely take Paxlovid.  Cautions    Paxlovid is not advised for patients with severe kidney or liver disease. If you have kidney or liver problems, the dose may need to be  changed.    If you are pregnant or breastfeeding, talk to your care team about your options.    If you are sexually active, use trusted birth control while taking Paxlovid.  Molnupiravir  How it works  Stops the virus from growing. Less amount of virus is easier for your body to fight.  Benefits  Lowers risk of a hospital stay or death from COVID-19.  How to take  Take 4 capsules by mouth every 12 hours (4 in the morning and 4 at night) for 5 days. Don't chew or break capsules. Swallow whole.  When to take  Take as soon as possible after positive COVID-19 test result, and within 5 days of your first symptoms.  Who can take it  Patients must be 18 years or older and have tested positive for COVID-19.  Possible side effects  Diarrhea (loose, watery stools), nausea (feeling sick to your stomach), dizziness, headaches.  Medicine conflicts  Right now, there are no known conflicts with other drugs. But tell your care team all medicines you take.  Cautions    This is not advised for patients who are pregnant.    Patients who could become pregnant should use trusted birth control until 4 days after their last dose.    Sexually active people of any gender should use trusted birth control for 3 months after their last dose.  Monoclonal antibodies  How it works  Monoclonal antibodies can detect pieces of the COVID virus and stop it from infecting your cells.  Benefits  Lowers risk of a hospital stay or death from COVID-19. Monoclonal antibodies are known to work well against the omicron variant.  How it is given to you  You will receive the treatment either by an infusion through your vein (IV) or shots.  When to take  Get as soon as possible after you test positive for COVID-19, and within 7 days of your first symptoms.  Who can take it  Patients must be 12 years or older, weigh at least 88 pounds and have tested positive for COVID-19.  Possible side effects  Fever, chills, diarrhea (loose, watery stools), dizziness,  itchiness and rash.  More serious side effects include: fever, difficulty breathing, low oxygen level in your blood, chills, tiredness, fast or slow heart rate, chest discomfort or pain, weakness, confusion, nausea, headache, shortness of breath, low or high blood pressure, wheezing, swelling of your lips, face, or throat, rash including hives, itching, muscle aches, dizziness, feeling faint and sweating.  If you receive an IV, you may have brief pain, bleeding and bruising of the skin, soreness, swelling and possible infection at the place where you get the IV needle.  Medicine conflicts  Please tell you care team other medicines you take so they can assess if there are any conflicts.  Cautions  Your doctor will talk with you about risks and benefits of this treatment and will help choose the best option for you.  For informational purposes only. Not to replace the advice of your health care provider.  Copyright   2022 [facility]. All rights reserved. Clinically reviewed by Valery Mehta. [employer] [id number] - 08/22.

## 2022-10-06 NOTE — PROGRESS NOTES
"Duane is a 72 year old who is being evaluated via a billable video visit.      How would you like to obtain your AVS? MyChart  If the video visit is dropped, the invitation should be resent by: Text to cell phone: [phone]  Will anyone else be joining your video visit? No  {If patient encounters technical issues they should call [phone] :774036}        {PROVIDER CHARTING PREFERENCE:558809}    Subjective   Duane is a 72 year old{ACCOMPANIED BY STATEMENT (Optional):872183}, presenting for the following health issues:  No chief complaint on file.      HPI     {SUPERLIST (Optional):616432}  {additonal problems for provider to add (Optional):010451}    Review of Systems   {ROS COMP (Optional):327277}      Objective           Vitals:  No vitals were obtained today due to virtual visit.    Physical Exam   {video visit exam brief selected:600736::\"GENERAL: Healthy, alert and no distress\",\"EYES: Eyes grossly normal to inspection.  No discharge or erythema, or obvious scleral/conjunctival abnormalities.\",\"RESP: No audible wheeze, cough, or visible cyanosis.  No visible retractions or increased work of breathing.  \",\"SKIN: Visible skin clear. No significant rash, abnormal pigmentation or lesions.\",\"NEURO: Cranial nerves grossly intact.  Mentation and speech appropriate for age.\",\"PSYCH: Mentation appears normal, affect normal/bright, judgement and insight intact, normal speech and appearance well-groomed.\"}    {Diagnostic Test Results (Optional):707341}    {AMBULATORY ATTESTATION (Optional):654044}        Video-Visit Details    Video Start Time: {video visit start/end time for provider to select:708134}    Type of service:  Video Visit    Video End Time:{video visit start/end time for provider to select:843781}    Originating Location (pt. Location): {video visit patient location:889643::\"Home\"}    Distant Location (provider location):  [facility]     Platform used for Video Visit: {Virtual Visit " "Platforms:424879::\"Bob\"}    "

## 2022-10-07 NOTE — PROGRESS NOTES
"Duane is a 72 year old who is being evaluated via a billable telephone visit.            Assessment & Plan     Infection due to 2019 novel coronavirus  Treatment as follows  - nirmatrelvir and ritonavir (PAXLOVID) therapy pack  Dispense: 30 tablet; Refill: 0    Acute cough  Patient will take honey and hydrate and rest  - nirmatrelvir and ritonavir (PAXLOVID) therapy pack  Dispense: 30 tablet; Refill: 0    Clinical diagnosis of COVID-19  Isolation time was discussed  - nirmatrelvir and ritonavir (PAXLOVID) therapy pack  Dispense: 30 tablet; Refill: 0               BMI:   Estimated body mass index is 27.81 kg/m  as calculated from the following:    Height as of 8/3/22: 1.797 m (5' 10.75\").    Weight as of 8/3/22: 89.8 kg (198 lb).           No follow-ups on file.    Misael Costa MD  [facility]    Duane is a 72 year oldpresenting for the following health issues:        Ill for two days with cough and myalgias;positive covid test;requesting Rx; Paxlovid sent; discharge medrol dospak           Constitutional, HEENT, cardiovascular, pulmonary, gi and gu systems are negative, except as otherwise noted.               Vitals:  No vitals were obtained today due to virtual visit.      healthy, alert, no distress and moderate distress  PSYCH: Alert and oriented times 3; coherent speech, normal   rate and volume, able to articulate logical thoughts, able   to abstract reason, no tangential thoughts, no hallucinations   or delusions  His affect is normal  RESP: No cough, no audible wheezing, able to talk in full sentences  Remainder of exam unable to be completed due to telephone visits              Phone call duration: 11 minutes    "

## 2022-12-09 ENCOUNTER — OFFICE VISIT (OUTPATIENT)
Dept: PHYSICAL MEDICINE AND REHAB | Facility: CLINIC | Age: 72
End: 2022-12-09
Payer: COMMERCIAL

## 2022-12-09 VITALS — HEART RATE: 96 BPM | DIASTOLIC BLOOD PRESSURE: 72 MMHG | OXYGEN SATURATION: 96 % | SYSTOLIC BLOOD PRESSURE: 138 MMHG

## 2022-12-09 DIAGNOSIS — R20.2 RIGHT HAND PARESTHESIA: ICD-10-CM

## 2022-12-09 DIAGNOSIS — M47.812 ARTHROPATHY OF CERVICAL FACET JOINT: ICD-10-CM

## 2022-12-09 DIAGNOSIS — M79.18 MYOFASCIAL PAIN: ICD-10-CM

## 2022-12-09 DIAGNOSIS — M54.2 CERVICAL SPINE PAIN: Primary | ICD-10-CM

## 2022-12-09 PROCEDURE — 99214 OFFICE O/P EST MOD 30 MIN: CPT | Performed by: PHYSICAL MEDICINE & REHABILITATION

## 2022-12-09 RX ORDER — GABAPENTIN 300 MG/1
300 CAPSULE ORAL DAILY
Qty: 30 CAPSULE | Refills: 3 | Status: SHIPPED | OUTPATIENT
Start: 2022-12-09 | End: 2023-06-16

## 2022-12-09 ASSESSMENT — PAIN SCALES - GENERAL: PAINLEVEL: MILD PAIN (3)

## 2022-12-09 NOTE — LETTER
12/9/2022         RE: Nando Carreno  [address]  [place] [address]        Dear Colleague,    Thank you for referring your patient, Nando Carreno, to the [facility]. Please see a copy of my visit note below.    Assessment/Plan:      Nando was seen today for medication refill.    Diagnoses and all orders for this visit:    Cervical spine pain  -     gabapentin (NEURONTIN) 300 MG capsule; Take 1 capsule (300 mg) by mouth daily    Arthropathy of cervical facet joint    Myofascial pain  -     gabapentin (NEURONTIN) 300 MG capsule; Take 1 capsule (300 mg) by mouth daily    Right hand paresthesia         Assessment: Pleasant 72 year old male with a history of reflux with:     1.   Persistent left-sided cervical spine pain with pain along upper trapezius.  Waxes and wanes in intensity.  Has decreased range of motion with rotation to the left.  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  Responded quite well for 1-2 weeks after cervical transforaminal epidural in the past.    Has been doing quite well with gabapentin.  Was taking 100 mg daily now has increase this to 300 mg which works for his bilateral neck pain.     2.  Persistent intermittent Right hand cramping with improved numbness and tingling digits 4 and 5 which persist.  History of cervical spine pain with right arm pain paresthesias and weakness.  Cervical decompression C8-T1 was done in May 2018.  He is having some right hand cramping at times with fine motor skills likely related to healing from the surgery.        3.  Myofascial pain parascapular region on the left.        Discussion:    1. I discussed the diagnosis and treatment options.  His pain is tolerable with gabapentin 300 mg daily we discussed keeping this dose.  We also discussed updating his MRI versus CT scan to evaluate for  progression of  facet arthropathy foraminal stenosis however he is tolerating his symptoms and at this time he is pleased with his current regimen.    2.  Continue gabapentin 300 mg daily.  Will provide new prescription for him of 300 mg capsules 1/day.    3.  We will provide some new stretches for cervical spine today which he can add to his regimen.    4.  Follow-up as needed if symptoms worsen.     It was our pleasure caring for your patient today, if there any questions or concerns please do not hesitate to contact us.      Subjective:   Patient ID: Nando Carreno is a 72 year old male.    History of Present Illness: Patient presents for follow-up of left-sided cervical spine pain decreased range of motion with pain radiating to his shoulder.  Also has some right parascapular pain and right hand cramping.  His left-sided neck pain is persistent no real change.  Decreased range of motion with rotation to the left.  This pain is controlled with 100 mg capsules of gabapentin 100 mg daily however he ran out of this medication and started taking a prior prescription of 300 mg and that has no side effects and has improved his pain further both with the right parascapular pain as well as the left shoulder pain.  Still has right hand cramping at times with fine motor skills.  Pain is an 8/10 at worst 3/10 today 0/10 at best.  He is pleased with his current progress and it does not limit him with regards to activities.      Imaging:Cervical MRI from November 2018 personally reviewed for medical decision making purposes.  This is from [facility].  Prior laminoplasty C4-C6.  Moderate to marked cervical degenerative disc height loss at C5-6 moderate C6-7.  There is moderate right foraminal stenosis moderate to severe left at C3-4, moderate to severe left foraminal stenosis at C4-5, moderate left at C5-6 moderate to severe right and severe left at C6-7.    Review of Systems: Pertinent positives: Some numbness and tingling and  weakness in the right hand..  Pertinent negatives:   No bowel or bladder incontinence.  No urinary retention.  No fevers, unintentional weight loss, balance changes, headaches, frequent falling, difficulty swallowing, or coordination difficulties.  All others reviewed are negative.    Past Medical History:   Diagnosis Date     Acid reflux        The following portions of the patient's history were reviewed and updated as appropriate: allergies, current medications, past family history, past medical history, past social history, past surgical history and problem list.           Objective:   Physical Exam:    /72 (BP Location: Right arm, Patient Position: Sitting)   Pulse 96   SpO2 96%   There is no height or weight on file to calculate BMI.      General: Alert and oriented with normal affect. Attention, knowledge, memory, and language are intact. No acute distress.      Gait:  Nonantalgic.  Decreased cervical rotation to the left.  Tenderness over the left posterior articular pillars around the C3-4 and C4-5 region.  Hypertonic tissue textures upper trapezius on the left.    Sensation is intact to light touch throughout the upper   extremities.  Reflexes are 2+ and symmetric in the biceps triceps and brachioradialis with negative Hoffmans.      Manual muscle testing reveals:  Right /Left out of 5     5/5 elbow flexors  5/5 elbow extensors  5/5 wrist extensors  5/5 interosseus  5/5 finger flexors         Again, thank you for allowing me to participate in the care of your patient.        Sincerely,        Luis A Teixeira DO

## 2022-12-09 NOTE — PROGRESS NOTES
Assessment/Plan:      Nando was seen today for medication refill.    Diagnoses and all orders for this visit:    Cervical spine pain  -     gabapentin (NEURONTIN) 300 MG capsule; Take 1 capsule (300 mg) by mouth daily    Arthropathy of cervical facet joint    Myofascial pain  -     gabapentin (NEURONTIN) 300 MG capsule; Take 1 capsule (300 mg) by mouth daily    Right hand paresthesia         Assessment: Pleasant 72 year old male with a history of reflux with:     1.   Persistent left-sided cervical spine pain with pain along upper trapezius.  Waxes and wanes in intensity.  Has decreased range of motion with rotation to the left.  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  Responded quite well for 1-2 weeks after cervical transforaminal epidural in the past.    Has been doing quite well with gabapentin.  Was taking 100 mg daily now has increase this to 300 mg which works for his bilateral neck pain.     2.  Persistent intermittent Right hand cramping with improved numbness and tingling digits 4 and 5 which persist.  History of cervical spine pain with right arm pain paresthesias and weakness.  Cervical decompression C8-T1 was done in May 2018.  He is having some right hand cramping at times with fine motor skills likely related to healing from the surgery.        3.  Myofascial pain parascapular region on the left.        Discussion:    1. I discussed the diagnosis and treatment options.  His pain is tolerable with gabapentin 300 mg daily we discussed keeping this dose.  We also discussed updating his MRI versus CT scan to evaluate for  progression of facet arthropathy foraminal stenosis however he is tolerating his symptoms and at this time he is pleased with his current regimen.    2.  Continue gabapentin 300 mg daily.  Will provide new prescription for him of 300 mg capsules 1/day.    3.  We will  provide some new stretches for cervical spine today which he can add to his regimen.    4.  Follow-up as needed if symptoms worsen.     It was our pleasure caring for your patient today, if there any questions or concerns please do not hesitate to contact us.      Subjective:   Patient ID: Nando Carreno is a 72 year old male.    History of Present Illness: Patient presents for follow-up of left-sided cervical spine pain decreased range of motion with pain radiating to his shoulder.  Also has some right parascapular pain and right hand cramping.  His left-sided neck pain is persistent no real change.  Decreased range of motion with rotation to the left.  This pain is controlled with 100 mg capsules of gabapentin 100 mg daily however he ran out of this medication and started taking a prior prescription of 300 mg and that has no side effects and has improved his pain further both with the right parascapular pain as well as the left shoulder pain.  Still has right hand cramping at times with fine motor skills.  Pain is an 8/10 at worst 3/10 today 0/10 at best.  He is pleased with his current progress and it does not limit him with regards to activities.      Imaging:Cervical MRI from November 2018 personally reviewed for medical decision making purposes.  This is from [facility].  Prior laminoplasty C4-C6.  Moderate to marked cervical degenerative disc height loss at C5-6 moderate C6-7.  There is moderate right foraminal stenosis moderate to severe left at C3-4, moderate to severe left foraminal stenosis at C4-5, moderate left at C5-6 moderate to severe right and severe left at C6-7.    Review of Systems: Pertinent positives: Some numbness and tingling and weakness in the right hand..  Pertinent negatives:   No bowel or bladder incontinence.  No urinary retention.  No fevers, unintentional weight loss, balance changes, headaches, frequent falling, difficulty swallowing, or coordination difficulties.  All  others reviewed are negative.    Past Medical History:   Diagnosis Date     Acid reflux        The following portions of the patient's history were reviewed and updated as appropriate: allergies, current medications, past family history, past medical history, past social history, past surgical history and problem list.           Objective:   Physical Exam:    /72 (BP Location: Right arm, Patient Position: Sitting)   Pulse 96   SpO2 96%   There is no height or weight on file to calculate BMI.      General: Alert and oriented with normal affect. Attention, knowledge, memory, and language are intact. No acute distress.      Gait:  Nonantalgic.  Decreased cervical rotation to the left.  Tenderness over the left posterior articular pillars around the C3-4 and C4-5 region.  Hypertonic tissue textures upper trapezius on the left.    Sensation is intact to light touch throughout the upper   extremities.  Reflexes are 2+ and symmetric in the biceps triceps and brachioradialis with negative Hoffmans.      Manual muscle testing reveals:  Right /Left out of 5     5/5 elbow flexors  5/5 elbow extensors  5/5 wrist extensors  5/5 interosseus  5/5 finger flexors

## 2023-02-09 ENCOUNTER — NURSE TRIAGE (OUTPATIENT)
Dept: NURSING | Facility: CLINIC | Age: 73
End: 2023-02-09
Payer: COMMERCIAL

## 2023-02-09 NOTE — TELEPHONE ENCOUNTER
Caller is pt's wife (Abby).  On Consent to Communicate.    Now reports being in [place].  Asks for health information re covid booster vaccine.  Advised contacting the nearest pharmacist in her current locale, or calling the customer service phone # on back of health ins card which typically has a nurse-line as well.  Pt verbalizes understanding.  Agrees to do so.    Nelly [facility] Nurse Advisor      Additional Information    Health Information question, no triage required and triager able to answer question    Protocols used: INFORMATION ONLY CALL-A-

## 2023-04-06 ASSESSMENT — ASTHMA QUESTIONNAIRES
ACT_TOTALSCORE: 22
QUESTION_3 LAST FOUR WEEKS HOW OFTEN DID YOUR ASTHMA SYMPTOMS (WHEEZING, COUGHING, SHORTNESS OF BREATH, CHEST TIGHTNESS OR PAIN) WAKE YOU UP AT NIGHT OR EARLIER THAN USUAL IN THE MORNING: NOT AT ALL
QUESTION_5 LAST FOUR WEEKS HOW WOULD YOU RATE YOUR ASTHMA CONTROL: WELL CONTROLLED
QUESTION_4 LAST FOUR WEEKS HOW OFTEN HAVE YOU USED YOUR RESCUE INHALER OR NEBULIZER MEDICATION (SUCH AS ALBUTEROL): TWO OR THREE TIMES PER WEEK
QUESTION_2 LAST FOUR WEEKS HOW OFTEN HAVE YOU HAD SHORTNESS OF BREATH: NOT AT ALL
ACT_TOTALSCORE: 22
QUESTION_1 LAST FOUR WEEKS HOW MUCH OF THE TIME DID YOUR ASTHMA KEEP YOU FROM GETTING AS MUCH DONE AT WORK, SCHOOL OR AT HOME: NONE OF THE TIME

## 2023-04-10 PROBLEM — Z86.0100 HISTORY OF COLONIC POLYPS: Status: ACTIVE | Noted: 2022-11-14

## 2023-04-10 PROBLEM — D12.2 BENIGN NEOPLASM OF ASCENDING COLON: Status: ACTIVE | Noted: 2022-11-14

## 2023-04-13 ENCOUNTER — OFFICE VISIT (OUTPATIENT)
Dept: FAMILY MEDICINE | Facility: CLINIC | Age: 73
End: 2023-04-13
Payer: COMMERCIAL

## 2023-04-13 VITALS
WEIGHT: 196.3 LBS | DIASTOLIC BLOOD PRESSURE: 76 MMHG | OXYGEN SATURATION: 96 % | HEIGHT: 71 IN | SYSTOLIC BLOOD PRESSURE: 124 MMHG | HEART RATE: 76 BPM | BODY MASS INDEX: 27.48 KG/M2 | RESPIRATION RATE: 18 BRPM | TEMPERATURE: 98.6 F

## 2023-04-13 DIAGNOSIS — Z01.818 PREOP GENERAL PHYSICAL EXAM: Primary | ICD-10-CM

## 2023-04-13 LAB
ATRIAL RATE - MUSE: 80 BPM
DIASTOLIC BLOOD PRESSURE - MUSE: NORMAL MMHG
ERYTHROCYTE [DISTWIDTH] IN BLOOD BY AUTOMATED COUNT: 12 % (ref 10–15)
HCT VFR BLD AUTO: 48.8 % (ref 40–53)
HGB BLD-MCNC: 16.7 G/DL (ref 13.3–17.7)
INTERPRETATION ECG - MUSE: NORMAL
MCH RBC QN AUTO: 31 PG (ref 26.5–33)
MCHC RBC AUTO-ENTMCNC: 34.2 G/DL (ref 31.5–36.5)
MCV RBC AUTO: 91 FL (ref 78–100)
P AXIS - MUSE: 42 DEGREES
PLATELET # BLD AUTO: 169 10E3/UL (ref 150–450)
PR INTERVAL - MUSE: 196 MS
QRS DURATION - MUSE: 72 MS
QT - MUSE: 372 MS
QTC - MUSE: 429 MS
R AXIS - MUSE: -1 DEGREES
RBC # BLD AUTO: 5.38 10E6/UL (ref 4.4–5.9)
SYSTOLIC BLOOD PRESSURE - MUSE: NORMAL MMHG
T AXIS - MUSE: -17 DEGREES
VENTRICULAR RATE- MUSE: 80 BPM
WBC # BLD AUTO: 9.5 10E3/UL (ref 4–11)

## 2023-04-13 PROCEDURE — 93005 ELECTROCARDIOGRAM TRACING: CPT | Performed by: FAMILY MEDICINE

## 2023-04-13 PROCEDURE — 36415 COLL VENOUS BLD VENIPUNCTURE: CPT | Performed by: FAMILY MEDICINE

## 2023-04-13 PROCEDURE — 85027 COMPLETE CBC AUTOMATED: CPT | Performed by: FAMILY MEDICINE

## 2023-04-13 PROCEDURE — 99214 OFFICE O/P EST MOD 30 MIN: CPT | Performed by: FAMILY MEDICINE

## 2023-04-13 PROCEDURE — 93010 ELECTROCARDIOGRAM REPORT: CPT | Performed by: INTERNAL MEDICINE

## 2023-04-13 PROCEDURE — 80048 BASIC METABOLIC PNL TOTAL CA: CPT | Performed by: FAMILY MEDICINE

## 2023-04-13 ASSESSMENT — PAIN SCALES - GENERAL: PAINLEVEL: NO PAIN (0)

## 2023-04-13 NOTE — LETTER
April 16, 2023      Duane Carreno  [address] [place] [place] [id number]        Dear ,    We are writing to inform you of your test results.     Sugar was a little high otherwise all OK...less carbs;more exercise once you get new hip    Resulted Orders   CBC with platelets   Result Value Ref Range    WBC Count 9.5 4.0 - 11.0 10e3/uL    RBC Count 5.38 4.40 - 5.90 10e6/uL    Hemoglobin 16.7 13.3 - 17.7 g/dL    Hematocrit 48.8 40.0 - 53.0 %    MCV 91 78 - 100 fL    MCH 31.0 26.5 - 33.0 pg    MCHC 34.2 31.5 - 36.5 g/dL    RDW 12.0 10.0 - 15.0 %    Platelet Count 169 150 - 450 10e3/uL   Basic metabolic panel  (Ca, Cl, CO2, Creat, Gluc, K, Na, BUN)   Result Value Ref Range    Sodium 141 136 - 145 mmol/L    Potassium 4.3 3.4 - 5.3 mmol/L    Chloride 100 98 - 107 mmol/L    Carbon Dioxide (CO2) 19 (L) 22 - 29 mmol/L    Anion Gap 22 (H) 7 - 15 mmol/L    Urea Nitrogen 14.4 8.0 - 23.0 mg/dL    Creatinine 1.04 0.67 - 1.17 mg/dL    Calcium 9.8 8.8 - 10.2 mg/dL    Glucose 108 (H) 70 - 99 mg/dL    GFR Estimate 76 >60 mL/min/1.73m2      Comment:      eGFR calculated using 2021 CKD-EPI equation.       If you have any questions or concerns, please call the clinic at the number listed above.       Sincerely,      Misael Costa MD

## 2023-04-13 NOTE — PROGRESS NOTES
[facility]  [address]  [facility] [address]  Phone: [phone]  Fax: [phone]  Primary Provider: Trinidad Costa  Pre-op Performing Provider: TRINIDAD COSTA      PREOPERATIVE EVALUATION:  Today's date: 4/13/2023    Nando Carreno is a 72 year old male who presents for a preoperative evaluation.      12/1/2021    12:49 PM   Additional Questions   Roomed by Maritza   Accompanied by Abby spouse     Surgical Information:  Surgery/Procedure: hip replacement  Surgery Location: [facility] -   Surgeon: Dr Augustin  Surgery Date: 4/27/23  Time of Surgery: tbd  Where patient plans to recover: At home with family  Fax number for surgical facility: tbd    Assessment & Plan     The proposed surgical procedure is considered LOW risk.    Preop general physical exam  Work-up to include the following  - CBC with platelets  - Basic metabolic panel  (Ca, Cl, CO2, Creat, Gluc, K, Na, BUN)  - EKG 12-lead, tracing only                     RECOMMENDATION:  APPROVAL GIVEN to proceed with proposed procedure, without further diagnostic evaluation.            Subjective     HPI related to upcoming procedure: Duane is presenting today for preoperative clearance for a total hip arthroplasty April 27, 2023.  Patient has had multi joint polyarthritis symptomatology including a total left knee replacement.  Patient has had multiple corticosteroid injections in the hip in the last year or 2 with temporary relief but is now at end-stage arthritis point.  Past medical history surgical history social history reviewed.  Patient has been therapeutically optimized for the anticipated procedure.  Laboratory is pending EKG was reviewed personally by myself.  All medical questions asked were answered.  Pleasure to see the patient again and we are aware that postoperative pain management will be handled by orthopedic surgery.        4/6/2023    12:40 PM   Preop Questions   1. Have you ever had a heart attack or stroke?  No   2. Have you ever had surgery on your heart or blood vessels, such as a stent placement, a coronary artery bypass, or surgery on an artery in your head, neck, heart, or legs? No   3. Do you have chest pain with activity? No   4. Do you have a history of  heart failure? No   5. Do you currently have a cold, bronchitis or symptoms of other infection? No   6. Do you have a cough, shortness of breath, or wheezing? No   7. Do you or anyone in your family have previous history of blood clots? No   8. Do you or does anyone in your family have a serious bleeding problem such as prolonged bleeding following surgeries or cuts? No   9. Have you ever had problems with anemia or been told to take iron pills? No   10. Have you had any abnormal blood loss such as black, tarry or bloody stools? No   11. Have you ever had a blood transfusion? No   12. Are you willing to have a blood transfusion if it is medically needed before, during, or after your surgery? Yes   13. Have you or any of your relatives ever had problems with anesthesia? No   14. Do you have sleep apnea, excessive snoring or daytime drowsiness? No   15. Do you have any artifical heart valves or other implanted medical devices like a pacemaker, defibrillator, or continuous glucose monitor? No   16. Do you have artificial joints? YES -    17. Are you allergic to latex? No       Health Care Directive:  Patient does not have a Health Care Directive or Living Will: Patient states has Advance Directive and will bring in a copy to clinic.    Preoperative Review of :   reviewed - no record of controlled substances prescribed.          Review of Systems  CONSTITUTIONAL: NEGATIVE for fever, chills, change in weight  INTEGUMENTARY/SKIN: NEGATIVE for worrisome rashes, moles or lesions  EYES: NEGATIVE for vision changes or irritation  ENT/MOUTH: NEGATIVE for ear, mouth and throat problems  RESP: NEGATIVE for significant cough or SOB  CV: NEGATIVE for chest pain,  palpitations or peripheral edema  GI: NEGATIVE for nausea, abdominal pain, heartburn, or change in bowel habits  : NEGATIVE for frequency, dysuria, or hematuria  MUSCULOSKELETAL: NEGATIVE for significant arthralgias or myalgia  NEURO: NEGATIVE for weakness, dizziness or paresthesias  ENDOCRINE: NEGATIVE for temperature intolerance, skin/hair changes  HEME: NEGATIVE for bleeding problems  PSYCHIATRIC: NEGATIVE for changes in mood or affect    Patient Active Problem List    Diagnosis Date Noted     History of colonic polyps 11/14/2022     Priority: Medium     Benign neoplasm of ascending colon 11/14/2022     Priority: Medium     Asthma      Priority: Medium     Created by Conversion         Overweight      Priority: Medium     Created by Conversion         Cervical nerve root compression 04/27/2018     Priority: Medium     Benign neoplasm of cecum 11/02/2017     Priority: Medium     Polyp of colon 10/31/2017     Priority: Medium     Created by Conversion  Replacement Utility updated for latest IMO load         Hemorrhoids 10/31/2017     Priority: Medium     Hyperlipemia      Priority: Medium     Created by Conversion         Enthesopathy Of The Left Knee      Priority: Medium     Created by Conversion  Replacement Utility updated for latest IMO load         Diverticulosis      Priority: Medium     Created by Conversion  Replacement Utility updated for latest IMO load         Diverticula, colon 08/06/2015     Priority: Medium     Diverticulitis with perforation 07/18/2015     Priority: Medium     Replacement Utility updated for latest IMO load         Esophageal reflux      Priority: Medium     Created by Conversion         Benign Essential Hypertension      Priority: Medium     Created by Conversion         Inflammatory arthritis 04/22/2015     Priority: Medium     Arthralgia 04/08/2015     Priority: Medium     Polyarthropathy 04/08/2015     Priority: Medium     Wrist arthritis 04/08/2015     Priority: Medium      Shoulder impingement 04/08/2015     Priority: Medium     Influenza      Priority: Medium     Created by Conversion          Past Medical History:   Diagnosis Date     Acid reflux      Past Surgical History:   Procedure Laterality Date     APPENDECTOMY       BACK SURGERY       CERVICAL LAMINECTOMY N/A 5/22/2018    Procedure: RIGHT CERVICAL 6-7, CERVICAL 7- THORACIC 1, THORACIC 1-2 HEMILAMINOTOMY MEDIAL FACETECTOMY ANF FORAMINOTOMY;  Surgeon: Brittany Victor MD;  Location: [facility];  Service:      CERVICAL SPINE SURGERY  2000     COLON SURGERY  2015    sigmoid resection for diverticulitis     FRACTURE SURGERY       HC REMOVAL PREPATELLA BURSA Left     Description: Excision Of Prepatellar Bursa;  Recorded: 05/12/2014;     JOINT REPLACEMENT Right     right TKA     LUMBAR SPINE SURGERY      X2     OTHER SURGICAL HISTORY      fistula reemoval intestines     OTHER SURGICAL HISTORY      rectal fistula repairmultiple     PICC  8/13/2015          RECTAL PROLAPSE REPAIR N/A 8/6/2015    Procedure: SIGMOID RESECTION;  Surgeon: Juan Ramon Carrion MD;  Location: [facility];  Service:      Current Outpatient Medications   Medication Sig Dispense Refill     albuterol (PROAIR HFA/PROVENTIL HFA/VENTOLIN HFA) 108 (90 Base) MCG/ACT inhaler INHALE 2 PUFFS BY MOUTH EVERY 4 HOURS AS NEEDED FOR WHEEZING. 8.5 g 3     b complex vitamins tablet Take 1 tablet by mouth daily       budesonide-formoterol (SYMBICORT) 80-4.5 MCG/ACT Inhaler        gabapentin (NEURONTIN) 100 MG capsule Take up to 3 capsules daily for neck pain 90 capsule 0     gabapentin (NEURONTIN) 300 MG capsule Take 1 capsule (300 mg) by mouth daily 30 capsule 3     glucosamine-chondroitin 500-400 mg cap [GLUCOSAMINE-CHONDROITIN 500-400 MG CAP] Take 2 capsules by mouth daily.        omeprazole (PRILOSEC) 20 MG capsule Take 20 mg by mouth daily as needed         Allergies   Allergen Reactions     Statins-Hmg-Coa Reductase Inhibitors [Hmg-Coa-R Inhibitors] Muscle Pain  "(Myalgia)     Niacin Rash     Rash all over with high dose of niacin  Rash all over with high dose of niacin          Social History     Tobacco Use     Smoking status: Former     Types: Cigarettes     Quit date: 1994     Years since quittin.2     Smokeless tobacco: Never   Vaping Use     Vaping status: Never Used   Substance Use Topics     Alcohol use: Yes     Comment: Alcoholic Drinks/day: occasionally        History   Drug Use No         Objective     /76   Pulse 76   Temp 98.6  F (37  C)   Resp 18   Ht 1.797 m (5' 10.75\")   Wt 89 kg (196 lb 4.8 oz)   SpO2 96%   BMI 27.57 kg/m      Physical Exam    GENERAL APPEARANCE: healthy, alert and no distress     EYES: EOMI,  PERRL     HENT: ear canals and TM's normal and nose and mouth without ulcers or lesions     NECK: no adenopathy, no asymmetry, masses, or scars and thyroid normal to palpation     RESP: lungs clear to auscultation - no rales, rhonchi or wheezes     CV: regular rates and rhythm, normal S1 S2, no S3 or S4 and no murmur, click or rub     ABDOMEN:  soft, nontender, no HSM or masses and bowel sounds normal     MS: extremities normal- no gross deformities noted, no evidence of inflammation in joints, FROM in all extremities.     SKIN: no suspicious lesions or rashes     NEURO: Normal strength and tone, sensory exam grossly normal, mentation intact and speech normal     PSYCH: mentation appears normal. and affect normal/bright     LYMPHATICS: No cervical adenopathy    Recent Labs   Lab Test 21  1013   HGB 16.0   *      POTASSIUM 4.2   CR 0.93        Diagnostics:  No labs were ordered during this visit.   EKG: appears normal, NSR, normal axis, normal intervals, no acute ST/T changes c/w ischemia, no LVH by voltage criteria, unchanged from previous tracings    Revised Cardiac Risk Index (RCRI):  The patient has the following serious cardiovascular risks for perioperative complications:   - No serious cardiac risks = 0 " points     RCRI Interpretation: 0 points: Class I (very low risk - 0.4% complication rate)           Signed Electronically by: Misael Costa MD  Copy of this evaluation report is provided to requesting physician.

## 2023-04-14 LAB
ANION GAP SERPL CALCULATED.3IONS-SCNC: 22 MMOL/L (ref 7–15)
BUN SERPL-MCNC: 14.4 MG/DL (ref 8–23)
CALCIUM SERPL-MCNC: 9.8 MG/DL (ref 8.8–10.2)
CHLORIDE SERPL-SCNC: 100 MMOL/L (ref 98–107)
CREAT SERPL-MCNC: 1.04 MG/DL (ref 0.67–1.17)
DEPRECATED HCO3 PLAS-SCNC: 19 MMOL/L (ref 22–29)
GFR SERPL CREATININE-BSD FRML MDRD: 76 ML/MIN/1.73M2
GLUCOSE SERPL-MCNC: 108 MG/DL (ref 70–99)
POTASSIUM SERPL-SCNC: 4.3 MMOL/L (ref 3.4–5.3)
SODIUM SERPL-SCNC: 141 MMOL/L (ref 136–145)

## 2023-04-27 ENCOUNTER — TRANSFERRED RECORDS (OUTPATIENT)
Dept: HEALTH INFORMATION MANAGEMENT | Facility: CLINIC | Age: 73
End: 2023-04-27

## 2023-05-17 ENCOUNTER — OFFICE VISIT (OUTPATIENT)
Dept: FAMILY MEDICINE | Facility: CLINIC | Age: 73
End: 2023-05-17
Payer: COMMERCIAL

## 2023-05-17 VITALS
HEART RATE: 92 BPM | RESPIRATION RATE: 18 BRPM | SYSTOLIC BLOOD PRESSURE: 130 MMHG | WEIGHT: 192 LBS | DIASTOLIC BLOOD PRESSURE: 78 MMHG | HEIGHT: 71 IN | TEMPERATURE: 98.6 F | BODY MASS INDEX: 26.88 KG/M2 | OXYGEN SATURATION: 98 %

## 2023-05-17 DIAGNOSIS — I82.621 ACUTE DEEP VEIN THROMBOSIS (DVT) OF RADIAL VEIN OF RIGHT UPPER EXTREMITY (H): Primary | ICD-10-CM

## 2023-05-17 PROCEDURE — 99213 OFFICE O/P EST LOW 20 MIN: CPT | Performed by: FAMILY MEDICINE

## 2023-05-17 RX ORDER — HYDROXYZINE HYDROCHLORIDE 25 MG/1
TABLET, FILM COATED ORAL
COMMUNITY
Start: 2023-04-24 | End: 2023-10-04

## 2023-05-17 RX ORDER — AMOXICILLIN 500 MG/1
500 CAPSULE ORAL 4 TIMES DAILY
Qty: 4 CAPSULE | Refills: 3 | Status: SHIPPED | OUTPATIENT
Start: 2023-05-17 | End: 2023-10-04

## 2023-05-17 RX ORDER — OXYCODONE HYDROCHLORIDE 5 MG/1
TABLET ORAL
COMMUNITY
Start: 2023-04-26 | End: 2023-10-04

## 2023-05-17 RX ORDER — ASPIRIN 81 MG/1
TABLET, CHEWABLE ORAL
COMMUNITY
Start: 2023-04-25 | End: 2023-10-04

## 2023-05-17 ASSESSMENT — PAIN SCALES - GENERAL: PAINLEVEL: NO PAIN (1)

## 2023-05-17 NOTE — PROGRESS NOTES
Hospital Follow-up Visit:    Hospital/Nursing Home/IP Rehab Facility: [facility]  Date of Admission: 5/5/2023  Date of Discharge: Same  Reason(s) for Admission: DVT right radial artery    Was your hospitalization related to COVID-19? No   Problems taking medications regularly:  None  Medication changes since discharge: None  Problems adhering to non-medication therapy:  None    Summary of hospitalization:  [facility] discharge summary reviewed  Diagnostic Tests/Treatments reviewed.  Follow up needed: none  Other Healthcare Providers Involved in Patient s Care:         None  Update since discharge: improved.   Plan of care communicated with patient     History plan and assessment: TM was recovering from hip surgery in a routine manner but noticed painful swelling in his right wrist area which felt like a knot.  Became inflamed red tender patient was taken to the emergency room where patient was seen and evaluated ultrasound of the right upper extremity revealed a DVT in the right distal radial artery; patient was discharged after being started on Eliquis twice daily.  There was a question as to whether the intravenous line which was started in this area prior to surgery was inflamed.  Patient has had no shortness of breath dyspnea chest pain symptomatology related to pulmonary embolism.  After being started on Eliquis and discontinuing his baby aspirin and warm packing the right radial artery swelling and has almost dissipated patient is pain-free.  Today's examination reveals very minimal swelling no redness no tenderness no palpable lump.  Patient may finish his prescribed 21-day course of Eliquis and then go back on 2 baby aspirin.  He was also told not to take any fish oil tablets during therapy which she is cooperated with.  He is accompanied by his wife he is ambulating well he is difficulty getting out of a chair uses a cane for support.  I personally walked him out  to the parking lot and he is really doing very well follow-up routinely 6 to 8 weeks for cholesterol follow-up

## 2023-06-16 DIAGNOSIS — M79.18 MYOFASCIAL PAIN: ICD-10-CM

## 2023-06-16 DIAGNOSIS — M54.2 CERVICAL SPINE PAIN: ICD-10-CM

## 2023-06-16 RX ORDER — GABAPENTIN 300 MG/1
300 CAPSULE ORAL DAILY
Qty: 30 CAPSULE | Refills: 3 | Status: SHIPPED | OUTPATIENT
Start: 2023-06-16 | End: 2023-10-19

## 2023-07-10 ENCOUNTER — OFFICE VISIT (OUTPATIENT)
Dept: PHYSICAL MEDICINE AND REHAB | Facility: CLINIC | Age: 73
End: 2023-07-10
Payer: COMMERCIAL

## 2023-07-10 VITALS
WEIGHT: 193 LBS | HEART RATE: 77 BPM | SYSTOLIC BLOOD PRESSURE: 127 MMHG | BODY MASS INDEX: 27.11 KG/M2 | DIASTOLIC BLOOD PRESSURE: 71 MMHG

## 2023-07-10 DIAGNOSIS — M54.2 CERVICAL SPINE PAIN: ICD-10-CM

## 2023-07-10 DIAGNOSIS — M47.812 ARTHROPATHY OF CERVICAL FACET JOINT: ICD-10-CM

## 2023-07-10 DIAGNOSIS — R20.2 RIGHT HAND PARESTHESIA: ICD-10-CM

## 2023-07-10 DIAGNOSIS — R20.2 BILATERAL LEG PARESTHESIA: Primary | ICD-10-CM

## 2023-07-10 PROCEDURE — 99214 OFFICE O/P EST MOD 30 MIN: CPT | Performed by: PHYSICAL MEDICINE & REHABILITATION

## 2023-07-10 ASSESSMENT — PAIN SCALES - GENERAL: PAINLEVEL: NO PAIN (0)

## 2023-07-10 NOTE — LETTER
7/10/2023         RE: Nando Carreno  [address]  [place] [address]        Dear Colleague,    Thank you for referring your patient, Nando Carreno, to the [facility]. Please see a copy of my visit note below.    Assessment/Plan:      Nando was seen today for neck pain and shoulder pain.    Diagnoses and all orders for this visit:    Bilateral leg paresthesia  -     EMG; Future    Right hand paresthesia    Arthropathy of cervical facet joint    Cervical spine pain         Assessment: Pleasant 73 year old male with a history of reflux with:     1.    New progressive paresthesias bilateral lower extremities in the toes.  This is consistent with a peripheral polyneuropathy.  Has had normal thyroid function B12 and glucose 2 years ago.  Recent BMP showed borderline glucose 108.    2.  Intermittent right hand cramping with improved numbness and tingling digits 4 and 5 which persist.  History of cervical spine pain with right arm pain paresthesias and weakness.  Cervical decompression C8-T1 was done in May 2018.  He is having some right hand cramping at times with fine motor skills likely related to healing from the surgery.   Doing well with gabapentin 300 mg daily.    3. left-sided cervical spine pain with pain along upper trapezius.  Waxes and wanes in intensity.  Has decreased range of motion with rotation to the left.  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  Responded quite well for 1-2 weeks after cervical transforaminal epidural in the past.      Continues to do well with 300 mg of gabapentin daily          Discussion:    1.  We discussed the diagnosis and treatment options.  We discussed the options of electrodiagnostic testing, change in gabapentin dosage.  I do not believe we need new MRI imaging at this time.    2.  EMG bilateral lower extremities to  evaluate.    3.  Continue with B complex and can try turmeric as well.    4.  Continue with gabapentin 300 mg daily for hand cramping.  This is working well.    5.  Follow-up with me after EMG.  Can consider hemoglobin A1c based on EMG results.    It was our pleasure caring for your patient today, if there any questions or concerns please do not hesitate to contact us.      Subjective:   Patient ID: Nando Carreno is a 73 year old male.    History of Present Illness: Patient presents for follow-up evaluation of right upper extremity cramping as well as evaluation of bilateral foot paresthesias.  His right arm cramping in the hand is doing quite well with gabapentin 300 mg daily which he takes and with that his symptoms are tolerable.  He has 2 refills left and is hoping to have ongoing refills of that medication.    He also notes bilateral foot paresthesias right worse than left all the toes of both feet into the metatarsal arch is on the plantar aspects.  No tingling on the dorsal aspects.  This is worse at night with laying in bed and covers touching his feet.  Also describes cramping in the feet.  This is slowly been progressive over the past couple of years.  Has had lumbar spine surgery in the past.  Recently he started to have cramping in his feet.  Takes vitamin B complex which helps his hand arthritis.    Labs: Labs reviewed from 2021 revealing glucose normal 102 August 2021, normal magnesium 2.1, B12 normal 530 TSH normal 191.  Recent BMP April of this year does show borderline glucose 108.      Imaging: MRI cervical spine from 2018 was reviewed from [facility] showing mild disc height loss C3-4 C4-5 marked C5-6 moderate C6-7.  C6-7 mild facet arthropathy with moderate to severe right and severe left foraminal stenosis.  Mild right and moderate left foraminal stenosis C5-6 and moderate to severe left foraminal stenosis C4-5 C3-4.  History of laminoplasty.    Review of Systems: Pertinent positives:  Paresthesias in the feet.  Coordination issues of the hands.  Pertinent negatives: No   weakness.  No bowel or bladder incontinence.  No urinary retention.  No fevers, unintentional weight loss, balance changes, headaches, frequent falling, difficulty swallowing.           Past Medical History:   Diagnosis Date     Acid reflux        The following portions of the patient's history were reviewed and updated as appropriate: allergies, current medications, past family history, past medical history, past social history, past surgical history and problem list.           Objective:   Physical Exam:    /71   Pulse 77   Wt 193 lb (87.5 kg)   BMI 27.11 kg/m    Body mass index is 27.11 kg/m .      General: Alert and oriented with normal affect. Attention, knowledge, memory, and language are intact. No acute distress.     CV: No lower extremity edema.  Skin: No rashes seen.    Gait:  Nonantalgic    Sensation is intact to light touch throughout the upper and lower extremities.  Reflexes are 2+ and symmetric in the biceps triceps and brachioradialis with negative Hoffmans. 2+ patellar and 0 Achilles     Manual muscle testing reveals:  Right /Left out of 5     5/5 elbow flexors  5/5 elbow extensors  5/5 wrist extensors  5/5 interosseus  5/5 finger flexors  5/5 hip flexors  5/5 knee flexors  5/5 knee extensors  5/5 ankle plantar flexors  5/5 ankle dorsiflexors  5/5  [facility]        Again, thank you for allowing me to participate in the care of your patient.        Sincerely,        Luis A Teixeira DO

## 2023-07-10 NOTE — PROGRESS NOTES
Assessment/Plan:      Nando was seen today for neck pain and shoulder pain.    Diagnoses and all orders for this visit:    Bilateral leg paresthesia  -     EMG; Future    Right hand paresthesia    Arthropathy of cervical facet joint    Cervical spine pain         Assessment: Pleasant 73 year old male with a history of reflux with:     1.    New progressive paresthesias bilateral lower extremities in the toes.  This is consistent with a peripheral polyneuropathy.  Has had normal thyroid function B12 and glucose 2 years ago.  Recent BMP showed borderline glucose 108.    2.  Intermittent right hand cramping with improved numbness and tingling digits 4 and 5 which persist.  History of cervical spine pain with right arm pain paresthesias and weakness.  Cervical decompression C8-T1 was done in May 2018.  He is having some right hand cramping at times with fine motor skills likely related to healing from the surgery.   Doing well with gabapentin 300 mg daily.    3. left-sided cervical spine pain with pain along upper trapezius.  Waxes and wanes in intensity.  Has decreased range of motion with rotation to the left.  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  Responded quite well for 1-2 weeks after cervical transforaminal epidural in the past.      Continues to do well with 300 mg of gabapentin daily          Discussion:    1.  We discussed the diagnosis and treatment options.  We discussed the options of electrodiagnostic testing, change in gabapentin dosage.  I do not believe we need new MRI imaging at this time.    2.  EMG bilateral lower extremities to evaluate.    3.  Continue with B complex and can try turmeric as well.    4.  Continue with gabapentin 300 mg daily for hand cramping.  This is working well.    5.  Follow-up with me after EMG.  Can consider hemoglobin A1c based on EMG results.    It was  our pleasure caring for your patient today, if there any questions or concerns please do not hesitate to contact us.      Subjective:   Patient ID: Nando Carerno is a 73 year old male.    History of Present Illness: Patient presents for follow-up evaluation of right upper extremity cramping as well as evaluation of bilateral foot paresthesias.  His right arm cramping in the hand is doing quite well with gabapentin 300 mg daily which he takes and with that his symptoms are tolerable.  He has 2 refills left and is hoping to have ongoing refills of that medication.    He also notes bilateral foot paresthesias right worse than left all the toes of both feet into the metatarsal arch is on the plantar aspects.  No tingling on the dorsal aspects.  This is worse at night with laying in bed and covers touching his feet.  Also describes cramping in the feet.  This is slowly been progressive over the past couple of years.  Has had lumbar spine surgery in the past.  Recently he started to have cramping in his feet.  Takes vitamin B complex which helps his hand arthritis.    Labs: Labs reviewed from 2021 revealing glucose normal 102 August 2021, normal magnesium 2.1, B12 normal 530 TSH normal 191.  Recent BMP April of this year does show borderline glucose 108.      Imaging: MRI cervical spine from 2018 was reviewed from [facility] showing mild disc height loss C3-4 C4-5 marked C5-6 moderate C6-7.  C6-7 mild facet arthropathy with moderate to severe right and severe left foraminal stenosis.  Mild right and moderate left foraminal stenosis C5-6 and moderate to severe left foraminal stenosis C4-5 C3-4.  History of laminoplasty.    Review of Systems: Pertinent positives: Paresthesias in the feet.  Coordination issues of the hands.  Pertinent negatives: No   weakness.  No bowel or bladder incontinence.  No urinary retention.  No fevers, unintentional weight loss, balance changes, headaches, frequent falling, difficulty  swallowing.           Past Medical History:   Diagnosis Date     Acid reflux        The following portions of the patient's history were reviewed and updated as appropriate: allergies, current medications, past family history, past medical history, past social history, past surgical history and problem list.           Objective:   Physical Exam:    /71   Pulse 77   Wt 193 lb (87.5 kg)   BMI 27.11 kg/m    Body mass index is 27.11 kg/m .      General: Alert and oriented with normal affect. Attention, knowledge, memory, and language are intact. No acute distress.     CV: No lower extremity edema.  Skin: No rashes seen.    Gait:  Nonantalgic    Sensation is intact to light touch throughout the upper and lower extremities.  Reflexes are 2+ and symmetric in the biceps triceps and brachioradialis with negative Hoffmans. 2+ patellar and 0 Achilles     Manual muscle testing reveals:  Right /Left out of 5     5/5 elbow flexors  5/5 elbow extensors  5/5 wrist extensors  5/5 interosseus  5/5 finger flexors  5/5 hip flexors  5/5 knee flexors  5/5 knee extensors  5/5 ankle plantar flexors  5/5 ankle dorsiflexors  5/5  EHL

## 2023-07-10 NOTE — PATIENT INSTRUCTIONS
Schedule an EMG (nerve test) with Dr Teixeira.   Continue with gabapentin 300 at bedtime.  You can try turmeric tablet or capsule.

## 2023-07-26 ENCOUNTER — LAB (OUTPATIENT)
Dept: LAB | Facility: CLINIC | Age: 73
End: 2023-07-26
Payer: COMMERCIAL

## 2023-07-26 ENCOUNTER — OFFICE VISIT (OUTPATIENT)
Dept: PHYSICAL MEDICINE AND REHAB | Facility: CLINIC | Age: 73
End: 2023-07-26
Attending: PHYSICAL MEDICINE & REHABILITATION
Payer: COMMERCIAL

## 2023-07-26 ENCOUNTER — HOSPITAL ENCOUNTER (OUTPATIENT)
Dept: GENERAL RADIOLOGY | Facility: HOSPITAL | Age: 73
Discharge: HOME | End: 2023-07-26
Attending: PHYSICAL MEDICINE & REHABILITATION | Admitting: PHYSICAL MEDICINE & REHABILITATION
Payer: COMMERCIAL

## 2023-07-26 VITALS — SYSTOLIC BLOOD PRESSURE: 132 MMHG | DIASTOLIC BLOOD PRESSURE: 75 MMHG | HEART RATE: 81 BPM

## 2023-07-26 DIAGNOSIS — R20.2 BILATERAL LEG PARESTHESIA: ICD-10-CM

## 2023-07-26 DIAGNOSIS — R20.2 BILATERAL LEG PARESTHESIA: Primary | ICD-10-CM

## 2023-07-26 DIAGNOSIS — M25.511 ACUTE PAIN OF RIGHT SHOULDER: ICD-10-CM

## 2023-07-26 DIAGNOSIS — R73.09 ELEVATED GLUCOSE: ICD-10-CM

## 2023-07-26 LAB
FOLATE SERPL-MCNC: 26.1 NG/ML (ref 4.6–34.8)
HBA1C MFR BLD: 6.1 % (ref 0–5.6)
TSH SERPL DL<=0.005 MIU/L-ACNC: 2.34 UIU/ML (ref 0.3–4.2)
VIT B12 SERPL-MCNC: 492 PG/ML (ref 232–1245)

## 2023-07-26 PROCEDURE — 36415 COLL VENOUS BLD VENIPUNCTURE: CPT

## 2023-07-26 PROCEDURE — 73030 X-RAY EXAM OF SHOULDER: CPT | Mod: RT

## 2023-07-26 PROCEDURE — 82746 ASSAY OF FOLIC ACID SERUM: CPT

## 2023-07-26 PROCEDURE — 84443 ASSAY THYROID STIM HORMONE: CPT

## 2023-07-26 PROCEDURE — 82607 VITAMIN B-12: CPT

## 2023-07-26 PROCEDURE — 95886 MUSC TEST DONE W/N TEST COMP: CPT | Performed by: PHYSICAL MEDICINE & REHABILITATION

## 2023-07-26 PROCEDURE — 95911 NRV CNDJ TEST 9-10 STUDIES: CPT | Performed by: PHYSICAL MEDICINE & REHABILITATION

## 2023-07-26 PROCEDURE — 83036 HEMOGLOBIN GLYCOSYLATED A1C: CPT

## 2023-07-26 PROCEDURE — 99214 OFFICE O/P EST MOD 30 MIN: CPT | Mod: 25 | Performed by: PHYSICAL MEDICINE & REHABILITATION

## 2023-07-26 ASSESSMENT — PAIN SCALES - GENERAL: PAINLEVEL: NO PAIN (0)

## 2023-07-26 NOTE — PROGRESS NOTES
[facility]  [address] [place] [address] [address]  [place] [address]  Office: [phone] Fax: [phone]    Electromyography and Nerve Conduction Study Report        Indication: Patient presents at the request of Dr. Luis A Teixeira for bilateral lower extremity EMG.  He has bilateral foot numbness and tingling worsening over time starting in the great toes all digits of both feet to the metatarsal arch right greater than left.  No back pain or leg cramping.  On exam he has normal sensation to light touch of lower extremities with the exception of the toes specially great toes is decreased.  2+ patellar 1+ Achilles reflexes bilaterally.  Normal muscle strength of the major muscle groups of the bilateral lower extremities.  Mild pes planus.      Pt Exam Discussion (Communication Barriers):  Electromyography and nerve conduction testing, including associated discomfort, risks, benefits, and alternatives was discussed with the patient prior to the procedure.  No learning/ communication barriers; patient verbalized understanding of procedure.  Informed consent was obtained.           Pt Assessment:  Testing was successfully completed; patient tolerated testing well.       Blood Thinners: None Skin Temperature: Warmed 29.6                   EMG/NCS  results:     Nerve Conduction Studies  Motor Sites      Segment Distal Latency Neg. Amp CV F-Latency F-Estimate Comment   Site  (ms) mV m/s ms ms    Right Fibular (EDB) Motor   Ankle Ankle-EDB 4.3 3.8       Knee Knee-Ankle 14.2 3.3 41      Left Tibial (AH) Motor   Ankle Ankle-AH 3.5 5.5       Knee Knee-Ankle 13.9 *3.1 43      Right Tibial (AH) Motor   Ankle Ankle-AH 3.6 4.9       Knee Knee-Ankle 14.5 *3.2 41      Right Ulnar (ADM) Motor   Wrist  3.4 10.1       Bel Elbow Bel Elbow-Wrist 8.3 8.2 51      Ab Elbow Ab Elbow-Wrist 10.7 9.6 53        Sensory Sites      Onset Lat Peak Lat Amp CV Comment   Site (ms) (ms)  V m/s    Right Radial Sensory    Forearm-Wrist 2.0 2.5 56 50    Left Sural Sensory   B-Ankle *NR *NR *NR *NR    Right Sural Sensory   B-Ankle *NR *NR *NR *NR      H-Reflex Sites      M-Lat H Lat H Neg Amp   Site (ms) (ms) mV   Left Tibial (Soleus) H-Reflex   Pop Fossa 6.6 39.1 0.50   Right Tibial (Soleus) H-Reflex   Pop Fossa 7.1 38.9 0.36     H-Reflex Sites      Lt. H Lat Rt. H Lat L-R H Lat L-R H Neg Amp   Site (ms) (ms) (ms) Norm mV   Tibial (Soleus) H-Reflex   Pop Fossa 39.1 38.9 0.20  < 3.0 0.14       NCS Waveforms:    Motor                Sensory           H-Reflex           Electromyography     Side Muscle Nerve Root Ins Act Fibs Psw Fasc Recrt Dur Amp Poly Comment   Right AntTibialis Dp Br Fibular L4-5 Nml Nml Nml Nml Nml Nml Nml 0    Right Gastroc Tibial S1-2 Nml Nml Nml Nml Nml Nml Nml 0    Right Fibularis Long Sup Br Fibular L5-S1 Nml Nml Nml Nml Nml Nml Nml 0    Right VastusLat Femoral L2-4 Nml Nml Nml Nml Nml Nml Nml 0    Right AbdHallucis MedPlantar S1-2 *Incr *1+ *1+ Nml Nml Nml Nml 0    Left AntTibialis Dp Br Fibular L4-5 Nml Nml Nml Nml Nml Nml Nml 0    Left Gastroc Tibial S1-2 Nml Nml Nml Nml Nml Nml Nml 0    Left Fibularis Long Sup Br Fibular L5-S1 Nml Nml Nml Nml Nml Nml Nml 0    Left VastusLat Femoral L2-4 Nml Nml Nml Nml Nml Nml Nml 0    Left RectFemoris Femoral L2-4 Nml Nml Nml Nml Nml Nml Nml 0    Left AbdHallucis MedPlantar S1-2 *Incr *1+ *1+ Nml Nml Nml Nml 0          Comment NCS: Abnormal study  1.  Absent bilateral sural SNAPs  2.  Normal bilateral peroneal and tibial CMAP's  3.  Symmetric tibial H reflexes.    Comment EMG: Abnormal study  1.  Mild increased insertional activity bilateral AH muscles with a few positive waves and fibrillation potentials bilaterally.  2.  Remainder of bilateral lower extremity needle EMG normal.    Interpretation: Abnormal study: There is electrodiagnostic evidence of:    1.  Absent bilateral sural SNAPs with mild increased insertional activity and a few positive waves and fibrillation  potentials of the bilateral AH muscles.  These findings can be normal for patient's age or under the correct clinical condition can be observed in a mild symmetric length-dependent sensorimotor peripheral polyneuropathy.     2.  There is no electrodiagnostic evidence of lumbosacral radiculopathy, lumbosacral plexopathy, or focal neuropathy in the bilateral lower extremities.    The testing was completed in its entirety by the physician.      It was our pleasure caring for your patient today, if there any questions or concerns please do not hesitate to contact us.

## 2023-07-26 NOTE — PROGRESS NOTES
Assessment/Plan:      Nando was seen today for emg.    Diagnoses and all orders for this visit:    Bilateral leg paresthesia  -     EMG  -     Vitamin B12; Future  -     Folate; Future  -     TSH with free T4 reflex; Future  -     Hemoglobin A1c; Future  -     MD NCS MOTOR W OR W/O F-WAVE, 9 OR 10  -     MD NEEDLE EMG EA EXTREMTY W/PARASPINAL AREA COMPLETE    Elevated glucose  -     Hemoglobin A1c; Future    Acute pain of right shoulder  -     XR Shoulder Right 2 Views; Future  -     Physical Therapy Referral; Future         Assessment: Pleasant 73 year old male  with a history of reflux, hyperlipidemia and diverticulitis with:     1.    Persistent paresthesias bilateral lower extremities in the toes.  This is consistent with a peripheral polyneuropathy.  Has had normal thyroid function B12 and glucose 2 years ago.  Recent BMP showed borderline glucose 108.    findings consistent with a mild length-dependent sensorimotor polyneuropathy on EMG today.     2.  3-month history of right shoulder pain.  This is consistent with osteoarthritis of the glenohumeral joint versus rotator cuff strain.      3.  Intermittent right hand cramping with improved numbness and tingling digits 4 and 5 which persist.  History of cervical spine pain with right arm pain paresthesias and weakness.  Cervical decompression C8-T1 was done in May 2018.  He is having some right hand cramping at times with fine motor skills likely related to healing from the surgery.   Doing well with gabapentin 300 mg daily.     3. left-sided cervical spine pain with pain along upper trapezius.  Waxes and wanes in intensity.  Has decreased range of motion with rotation to the left.  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  Responded quite well for 1-2 weeks after cervical transforaminal epidural in the past.      Continues to do well with 300 mg  of gabapentin daily          Discussion:    1.  I discussed the diagnosis and treatment options with the patient.  Reviewed his right shoulder pain which is consistent with either glenohumeral joint osteoarthritis versus rotator cuff strain/impingement.  We discussed conservative management and imaging.  We also discussed the peripheral polyneuropathy as well as potential for lab work/further work-up.    2.  Lab work as above to evaluate for any changes in his status such as thyroid dysfunction, diabetes, vitamin B 12 or folate deficiency.    3.  Continue gabapentin for now.  Is doing okay with her and her milligrams daily.    4.  Plain films of the right shoulder to evaluate.    5.  Start physical therapy for right shoulder program.    6.  Follow-up with me in 2 months.    It was our pleasure caring for your patient today, if there any questions or concerns please do not hesitate to contact us.      Subjective:   Patient ID: Nando Carreno is a 73 year old male.    History of Present Illness: Patient presents for follow-up evaluation of bilateral foot paresthesias along with right shoulder pain.  Foot paresthesias have been present for quite some time worsening mostly in the great toes right greater than left foot all digits of both feet and the metatarsal arch.  No pain associated with it or leg cramping.  Previously was having cramping with simvastatin/statin medications.  No low back pain.  Has had EMG done today.  Has had lab work done couple of years ago.    Also has 3-month history of right shoulder pain which is new.  Had left hip surgery and with using his arms to lift himself out of a chair has had right shoulder pain worse with any movement of the right arm pain is deep in the shoulder.  Was seen at orthopedics had steroid injection in subacromial bursa without any benefit per his report.  Has not had therapy or imaging.      EMG: Electrodiagnostic findings are consistent with a mild symmetric  length-dependent sensorimotor polyneuropathy, predominantly axonal.    Labs: Recent glucose elevated 108 on BMP from April 2023.  No other recent labs.  2021 labs: CK   was mildly elevated 195 B12 530, TSH normal 1.91.    Review of Systems: Pertinent positives: Foot paresthesias pertinent negatives: No numbness, tingling or weakness.  No bowel or bladder incontinence.  No urinary retention.  No fevers, unintentional weight loss, balance changes, headaches, frequent falling, difficulty swallowing, or coordination difficulties.  All others reviewed are negative.    Past Medical History:   Diagnosis Date    Acid reflux        The following portions of the patient's history were reviewed and updated as appropriate: allergies, current medications, past family history, past medical history, past social history, past surgical history and problem list.           Objective:   Physical Exam:    /75   Pulse 81   There is no height or weight on file to calculate BMI.      General: Alert and oriented with normal affect. Attention, knowledge, memory, and language are intact. No acute distress.   Eyes: Sclerae are clear.  Respirations: Unlabored. CV: No lower extremity edema.  Skin: No rashes seen.    Gait:  Nonantalgic  Mild decreased range of motion right shoulder in internal rotation with reproduction of symptoms.  Negative arm drop empty can and speeds test.  Negative Germain and Neer's on the right.  Sensation is intact to light touch throughout the upper  extremities.  Decreased light touch great toe bilaterally.  Reflexes are 2+ and symmetric in the biceps triceps and brachioradialis with negative Hoffmans. 2+ patellar and trace Achilles with downgoing toes.    Manual muscle testing reveals:  Right /Left out of 5  5/5 shoulder abductors  5/5 elbow flexors  5/5 elbow extensors  5/5 wrist extensors  5/5 interosseus  5/5 finger flexors  5/5 hip flexors  5/5 knee flexors  5/5 knee extensors  5/5 ankle plantar  flexors  5/5 ankle dorsiflexors  5/5  EHL

## 2023-07-26 NOTE — PATIENT INSTRUCTIONS
Labs ordered  An Xray was ordered for you today.  Please call Radiology at [phone].    A physical therapy order was provided for you today.  You will be contacted by physical therapy.  If nobody contacts you within 3 to 5 days, please contact the clinic at [phone].  It will be very important for you to do your physical therapy exercises on a regular basis to decrease your pain and prevent future pain flares.

## 2023-07-26 NOTE — LETTER
7/26/2023         RE: Nando Carreno  [address]  [place] [address]        Dear Colleague,    Thank you for referring your patient, Nando Carreno, to the [facility]. Please see a copy of my visit note below.    Assessment/Plan:      Nando was seen today for emg.    Diagnoses and all orders for this visit:    Bilateral leg paresthesia  -     EMG  -     Vitamin B12; Future  -     Folate; Future  -     TSH with free T4 reflex; Future  -     Hemoglobin A1c; Future  -     FL NCS MOTOR W OR W/O F-WAVE, 9 OR 10  -     FL NEEDLE EMG EA EXTREMTY W/PARASPINAL AREA COMPLETE    Elevated glucose  -     Hemoglobin A1c; Future    Acute pain of right shoulder  -     XR Shoulder Right 2 Views; Future  -     Physical Therapy Referral; Future         Assessment: Pleasant 73 year old male  with a history of reflux, hyperlipidemia and diverticulitis with:     1.    Persistent paresthesias bilateral lower extremities in the toes.  This is consistent with a peripheral polyneuropathy.  Has had normal thyroid function B12 and glucose 2 years ago.  Recent BMP showed borderline glucose 108.    findings consistent with a mild length-dependent sensorimotor polyneuropathy on EMG today.     2.  3-month history of right shoulder pain.  This is consistent with osteoarthritis of the glenohumeral joint versus rotator cuff strain.      3.  Intermittent right hand cramping with improved numbness and tingling digits 4 and 5 which persist.  History of cervical spine pain with right arm pain paresthesias and weakness.  Cervical decompression C8-T1 was done in May 2018.  He is having some right hand cramping at times with fine motor skills likely related to healing from the surgery.   Doing well with gabapentin 300 mg daily.     3. left-sided cervical spine pain with pain along upper trapezius.  Waxes and wanes in intensity.  Has decreased range of motion with rotation to the left.  He has severe foraminal  stenosis on the left at C4-5 along with significant facet arthropathy as well as severe facet arthropathy at C3-4 on MRI from 2018.  Responded quite well for 1-2 weeks after cervical transforaminal epidural in the past.      Continues to do well with 300 mg of gabapentin daily          Discussion:    1.  I discussed the diagnosis and treatment options with the patient.  Reviewed his right shoulder pain which is consistent with either glenohumeral joint osteoarthritis versus rotator cuff strain/impingement.  We discussed conservative management and imaging.  We also discussed the peripheral polyneuropathy as well as potential for lab work/further work-up.    2.  Lab work as above to evaluate for any changes in his status such as thyroid dysfunction, diabetes, vitamin B 12 or folate deficiency.    3.  Continue gabapentin for now.  Is doing okay with her and her milligrams daily.    4.  Plain films of the right shoulder to evaluate.    5.  Start physical therapy for right shoulder program.    6.  Follow-up with me in 2 months.    It was our pleasure caring for your patient today, if there any questions or concerns please do not hesitate to contact us.      Subjective:   Patient ID: Nando Carreno is a 73 year old male.    History of Present Illness: Patient presents for follow-up evaluation of bilateral foot paresthesias along with right shoulder pain.  Foot paresthesias have been present for quite some time worsening mostly in the great toes right greater than left foot all digits of both feet and the metatarsal arch.  No pain associated with it or leg cramping.  Previously was having cramping with simvastatin/statin medications.  No low back pain.  Has had EMG done today.  Has had lab work done couple of years ago.    Also has 3-month history of right shoulder pain which is new.  Had left hip surgery and with using his arms to lift himself out of a chair has had right shoulder pain worse with any movement of the  right arm pain is deep in the shoulder.  Was seen at orthopedics had steroid injection in subacromial bursa without any benefit per his report.  Has not had therapy or imaging.      EMG: Electrodiagnostic findings are consistent with a mild symmetric length-dependent sensorimotor polyneuropathy, predominantly axonal.    Labs: Recent glucose elevated 108 on BMP from April 2023.  No other recent labs.  2021 labs: CK   was mildly elevated 195 B12 530, TSH normal 1.91.    Review of Systems: Pertinent positives: Foot paresthesias pertinent negatives: No numbness, tingling or weakness.  No bowel or bladder incontinence.  No urinary retention.  No fevers, unintentional weight loss, balance changes, headaches, frequent falling, difficulty swallowing, or coordination difficulties.  All others reviewed are negative.    Past Medical History:   Diagnosis Date     Acid reflux        The following portions of the patient's history were reviewed and updated as appropriate: allergies, current medications, past family history, past medical history, past social history, past surgical history and problem list.           Objective:   Physical Exam:    /75   Pulse 81   There is no height or weight on file to calculate BMI.      General: Alert and oriented with normal affect. Attention, knowledge, memory, and language are intact. No acute distress.   Eyes: Sclerae are clear.  Respirations: Unlabored. CV: No lower extremity edema.  Skin: No rashes seen.    Gait:  Nonantalgic  Mild decreased range of motion right shoulder in internal rotation with reproduction of symptoms.  Negative arm drop empty can and speeds test.  Negative Germain and Neer's on the right.  Sensation is intact to light touch throughout the upper  extremities.  Decreased light touch great toe bilaterally.  Reflexes are 2+ and symmetric in the biceps triceps and brachioradialis with negative Hoffmans. 2+ patellar and trace Achilles with downgoing toes.    Manual  muscle testing reveals:  Right /Left out of 5  5/5 shoulder abductors  5/5 elbow flexors  5/5 elbow extensors  5/5 wrist extensors  5/5 interosseus  5/5 finger flexors  5/5 hip flexors  5/5 knee flexors  5/5 knee extensors  5/5 ankle plantar flexors  5/5 ankle dorsiflexors  5/5  [facility]  [address] [place] [address] [address]  [place] [address]  Office: [phone] Fax: [phone]    Electromyography and Nerve Conduction Study Report        Indication: Patient presents at the request of Dr. Luis A Teixeira for bilateral lower extremity EMG.  He has bilateral foot numbness and tingling worsening over time starting in the great toes all digits of both feet to the metatarsal arch right greater than left.  No back pain or leg cramping.  On exam he has normal sensation to light touch of lower extremities with the exception of the toes specially great toes is decreased.  2+ patellar 1+ Achilles reflexes bilaterally.  Normal muscle strength of the major muscle groups of the bilateral lower extremities.  Mild pes planus.      Pt Exam Discussion (Communication Barriers):  Electromyography and nerve conduction testing, including associated discomfort, risks, benefits, and alternatives was discussed with the patient prior to the procedure.  No learning/ communication barriers; patient verbalized understanding of procedure.  Informed consent was obtained.           Pt Assessment:  Testing was successfully completed; patient tolerated testing well.       Blood Thinners: None Skin Temperature: Warmed 29.6                   EMG/NCS  results:     Nerve Conduction Studies  Motor Sites      Segment Distal Latency Neg. Amp CV F-Latency F-Estimate Comment   Site  (ms) mV m/s ms ms    Right Fibular (EDB) Motor   Ankle Ankle-EDB 4.3 3.8       Knee Knee-Ankle 14.2 3.3 41      Left Tibial (AH) Motor   Ankle Ankle-AH 3.5 5.5       Knee Knee-Ankle 13.9 *3.1 43      Right Tibial (AH) Motor   Ankle Ankle-AH  3.6 4.9       Knee Knee-Ankle 14.5 *3.2 41      Right Ulnar (ADM) Motor   Wrist  3.4 10.1       Bel Elbow Bel Elbow-Wrist 8.3 8.2 51      Ab Elbow Ab Elbow-Wrist 10.7 9.6 53        Sensory Sites      Onset Lat Peak Lat Amp CV Comment   Site (ms) (ms)  V m/s    Right Radial Sensory   Forearm-Wrist 2.0 2.5 56 50    Left Sural Sensory   B-Ankle *NR *NR *NR *NR    Right Sural Sensory   B-Ankle *NR *NR *NR *NR      H-Reflex Sites      M-Lat H Lat H Neg Amp   Site (ms) (ms) mV   Left Tibial (Soleus) H-Reflex   Pop Fossa 6.6 39.1 0.50   Right Tibial (Soleus) H-Reflex   Pop Fossa 7.1 38.9 0.36     H-Reflex Sites      Lt. H Lat Rt. H Lat L-R H Lat L-R H Neg Amp   Site (ms) (ms) (ms) Norm mV   Tibial (Soleus) H-Reflex   Pop Fossa 39.1 38.9 0.20  < 3.0 0.14       NCS Waveforms:    Motor                Sensory           H-Reflex           Electromyography     Side Muscle Nerve Root Ins Act Fibs Psw Fasc Recrt Dur Amp Poly Comment   Right AntTibialis Dp Br Fibular L4-5 Nml Nml Nml Nml Nml Nml Nml 0    Right Gastroc Tibial S1-2 Nml Nml Nml Nml Nml Nml Nml 0    Right Fibularis Long Sup Br Fibular L5-S1 Nml Nml Nml Nml Nml Nml Nml 0    Right VastusLat Femoral L2-4 Nml Nml Nml Nml Nml Nml Nml 0    Right AbdHallucis MedPlantar S1-2 *Incr *1+ *1+ Nml Nml Nml Nml 0    Left AntTibialis Dp Br Fibular L4-5 Nml Nml Nml Nml Nml Nml Nml 0    Left Gastroc Tibial S1-2 Nml Nml Nml Nml Nml Nml Nml 0    Left Fibularis Long Sup Br Fibular L5-S1 Nml Nml Nml Nml Nml Nml Nml 0    Left VastusLat Femoral L2-4 Nml Nml Nml Nml Nml Nml Nml 0    Left RectFemoris Femoral L2-4 Nml Nml Nml Nml Nml Nml Nml 0    Left AbdHallucis MedPlantar S1-2 *Incr *1+ *1+ Nml Nml Nml Nml 0          Comment NCS: Abnormal study  1.  Absent bilateral sural SNAPs  2.  Normal bilateral peroneal and tibial CMAP's  3.  Symmetric tibial H reflexes.    Comment EMG: Abnormal study  1.  Mild increased insertional activity bilateral AH muscles with a few positive waves and fibrillation  potentials bilaterally.  2.  Remainder of bilateral lower extremity needle EMG normal.    Interpretation: Abnormal study: There is electrodiagnostic evidence of:    1.  Absent bilateral sural SNAPs with mild increased insertional activity and a few positive waves and fibrillation potentials of the bilateral AH muscles.  These findings can be normal for patient's age or under the correct clinical condition can be observed in a mild symmetric length-dependent sensorimotor peripheral polyneuropathy.     2.  There is no electrodiagnostic evidence of lumbosacral radiculopathy, lumbosacral plexopathy, or focal neuropathy in the bilateral lower extremities.    The testing was completed in its entirety by the physician.      It was our pleasure caring for your patient today, if there any questions or concerns please do not hesitate to contact us.        Again, thank you for allowing me to participate in the care of your patient.        Sincerely,        Luis A Teixeira, DO

## 2023-07-28 ENCOUNTER — THERAPY VISIT (OUTPATIENT)
Dept: PHYSICAL THERAPY | Facility: REHABILITATION | Age: 73
End: 2023-07-28
Attending: PHYSICAL MEDICINE & REHABILITATION
Payer: COMMERCIAL

## 2023-07-28 DIAGNOSIS — M25.511 ACUTE PAIN OF RIGHT SHOULDER: ICD-10-CM

## 2023-07-28 PROCEDURE — 97162 PT EVAL MOD COMPLEX 30 MIN: CPT | Mod: GP | Performed by: PHYSICAL THERAPIST

## 2023-07-28 PROCEDURE — 97110 THERAPEUTIC EXERCISES: CPT | Mod: GP | Performed by: PHYSICAL THERAPIST

## 2023-07-28 NOTE — PROGRESS NOTES
PHYSICAL THERAPY EVALUATION  Type of Visit: Evaluation    See electronic medical record for Abuse and Falls Screening details.    Subjective       Presenting condition or subjective complaint: Eddie has been having shoulder pain since getting a hip replacement in May when he was using his arm to push himself out of his chair. He got a cortisone injection which helped for a short time but now he is getting more pain again.  Date of onset: 04/26/23    Relevant medical history: Dizziness; Hearing problems; Neck injury   Dates & types of surgery: Knee replacement, hip replacement, neck, back    Prior diagnostic imaging/testing results: X-ray     Prior therapy history for the same diagnosis, illness or injury: No      Prior Level of Function  Transfers: Independent  Ambulation: Independent  ADL: Independent  IADL:     Living Environment  Social support: With a significant other or spouse   Type of home: House; 1 level; Basement   Stairs to enter the home: Yes 12 Is there a railing: Yes   Ramp: No   Stairs inside the home: Yes 1     Help at home: Home management tasks (cooking, cleaning); Home and Yard maintenance tasks  Equipment owned:       Employment: No    Hobbies/Interests: Cars, yard, cooking, travel    Patient goals for therapy: Everything    Pain assessment: Pain present     Objective   SHOULDER EVALUATION  PAIN: Pain Level at Rest: 0/10  Pain Level with Use: 8/10  Pain Location: shoulder  Pain Quality: Aching  Pain Frequency: intermittent  Pain is Exacerbated By: Pushing down, reaching back  Pain is Relieved By: NSAIDs and rest  INTEGUMENTARY (edema, incisions):   POSTURE:   GAIT:   Weightbearing Status:   Assistive Device(s):   Gait Deviations:   BALANCE/PROPRIOCEPTION:   WEIGHTBEARING ALIGNMENT:   ROM:  All equal bilaterally except IR. Right IR: L2, Left IR: T7    STRENGTH:  WFL, pain with discomfort with abduction.  FLEXIBILITY:   SPECIAL TESTS:    Left Right   Impingement     Neer's  Negative     Hawkin's-Jero  Positive   Coracoid Impingement     Internal impingement     Labral     Anterior Slide  Positive   Preston's  Positive   Crank  Negative    Instability     Apprehension (anterior)  Negative    Relocation (anterior)  Negative    Anterior Load & Shift  Positive   Posterior Load & Shift  Negative    Posterior instability (with 90 degrees flex)     Multi-Directional Instability      Sulcus  Positive   Biceps      Biceps loap  Negative   Speed's  Negative    Rotator Cuff Tear     Drop Arm  Negative    Belly Press  Negative    Lift off   Negative    Empty can  Positive     PALPATION:  Pain and taut bands in infraspinatus and subscap.  JOINT MOBILITY:   CERVICAL SCREEN:  Hx of cervical surgeries.  No radicular symptoms present.    Assessment & Plan   CLINICAL IMPRESSIONS  Medical Diagnosis: Acute pain of right shoulder    Treatment Diagnosis: Right shoulder pain.   Impression/Assessment: Patient is a 73 year old male with shoulder pain complaints.  The following significant findings have been identified: Pain, Decreased strength, Impaired posture, and Instability. These impairments interfere with their ability to perform recreational activities and household chores as compared to previous level of function. His signs and symptoms suggest possible injury to his RC and his labrum.    Clinical Decision Making (Complexity):  Clinical Presentation: Evolving/Changing  Clinical Presentation Rationale: based on medical and personal factors listed in PT evaluation  Clinical Decision Making (Complexity): Moderate complexity    PLAN OF CARE  Treatment Interventions:  Modalities: Dry Needling  Interventions: Gait Training, Manual Therapy, Neuromuscular Re-education, Therapeutic Activity, Therapeutic Exercise, Self-Care/Home Management    Long Term Goals     PT Goal 1  Goal Identifier: SPADI  Goal Description: Duane will demonstrate a decrease in pain and increase in function as measured by scoring </= 30 on the  TERRY.  Rationale: to maximize safety and independence with performance of ADLs and functional tasks  Goal Progress: 40  Target Date: 10/26/23  PT Goal 2  Goal Identifier: HEP  Goal Description: Duane will be independent in their HEP in order to facilitate return to prior level of function.  Rationale: to maximize safety and independence with performance of ADLs and functional tasks  Goal Progress: In progress  Target Date: 10/26/23  PT Goal 3  Goal Identifier: Reaching overhead  Goal Description: Duane will demonstrate improvement in his symptoms as demonstrated by being able to reach for objects on a high shelf with </= 4/10 pain.  Rationale: to maximize safety and independence with performance of ADLs and functional tasks  Goal Progress: Up to 8/10  Target Date: 10/26/23      Frequency of Treatment: 1 x/week  Duration of Treatment: 90 days    Education Assessment:        Risks and benefits of evaluation/treatment have been explained.   Patient/Family/caregiver agrees with Plan of Care.     Evaluation Time:     PT Eval, Moderate Complexity Minutes (16037): 24     Signing Clinician: IRAJ SINGLETON [facility]                                                                                   OUTPATIENT PHYSICAL THERAPY      PLAN OF TREATMENT FOR OUTPATIENT REHABILITATION   Patient's Last Name, First Name, Nando Corbett YOB: 1950   Provider's Name   [facility]   Medical Record No.  [id number]     Onset Date: 04/26/23  Start of Care Date: 07/28/23     Medical Diagnosis:  Acute pain of right shoulder      PT Treatment Diagnosis:  Right shoulder pain. Plan of Treatment  Frequency/Duration: 1 x/week/ 90 days    Certification date from 07/28/23 to 10/26/23         See note for plan of treatment details and functional goals     LUDIVINA PUENTES PT                         I CERTIFY THE NEED FOR THESE SERVICES FURNISHED UNDER        THIS  PLAN OF TREATMENT AND WHILE UNDER MY CARE     (Physician attestation of this document indicates review and certification of the therapy plan).                Referring Provider:  Luis A Teixeira, DO      Initial Assessment  See Epic Evaluation- Start of Care Date: 07/28/23

## 2023-08-04 ENCOUNTER — THERAPY VISIT (OUTPATIENT)
Dept: PHYSICAL THERAPY | Facility: REHABILITATION | Age: 73
End: 2023-08-04
Payer: COMMERCIAL

## 2023-08-04 DIAGNOSIS — M62.81 GENERALIZED MUSCLE WEAKNESS: Primary | ICD-10-CM

## 2023-08-04 DIAGNOSIS — M25.511 ACUTE PAIN OF RIGHT SHOULDER: ICD-10-CM

## 2023-08-04 PROCEDURE — 97110 THERAPEUTIC EXERCISES: CPT | Mod: GP | Performed by: PHYSICAL THERAPY ASSISTANT

## 2023-08-05 ENCOUNTER — HEALTH MAINTENANCE LETTER (OUTPATIENT)
Age: 73
End: 2023-08-05

## 2023-08-25 ENCOUNTER — THERAPY VISIT (OUTPATIENT)
Dept: PHYSICAL THERAPY | Facility: REHABILITATION | Age: 73
End: 2023-08-25
Payer: COMMERCIAL

## 2023-08-25 DIAGNOSIS — M25.511 ACUTE PAIN OF RIGHT SHOULDER: Primary | ICD-10-CM

## 2023-08-25 PROCEDURE — 97110 THERAPEUTIC EXERCISES: CPT | Mod: GP | Performed by: PHYSICAL THERAPIST

## 2023-08-25 PROCEDURE — 97530 THERAPEUTIC ACTIVITIES: CPT | Mod: GP | Performed by: PHYSICAL THERAPIST

## 2023-09-19 NOTE — PROGRESS NOTES
DISCHARGE  Reason for Discharge: Patient chooses to discontinue therapy.  Patient has failed to schedule further appointments.    Discharge Plan: Patient to continue home program.    Referring Provider:  Luis A Teixeira DO       08/25/23 0500   Appointment Info   Signing clinician's name / credentials Benny Greenberg, PT, DPT, CMTPT/DN   Visits Used 3   Medical Diagnosis Acute pain of right shoulder   PT Tx Diagnosis Right shoulder pain.   Quick Adds Certification   Progress Note/Certification   Start of Care Date 07/28/23   Onset of illness/injury or Date of Surgery 04/26/23   Therapy Frequency 1 x/week   Predicted Duration 90 days   Certification date from 07/28/23   Certification date to 10/26/23   Progress Note Due Date 10/26/23   Supervision   PT Assistant Visit Number 1   GOALS   PT Goals 2;3   PT Goal 1   Goal Identifier SPADI   Goal Description Duane will demonstrate a decrease in pain and increase in function as measured by scoring </= 30 on the SPADI.   Rationale to maximize safety and independence with performance of ADLs and functional tasks   Goal Progress 40   Target Date 10/26/23   PT Goal 2   Goal Identifier HEP   Goal Description Duane will be independent in their HEP in order to facilitate return to prior level of function.   Rationale to maximize safety and independence with performance of ADLs and functional tasks   Goal Progress Met   Target Date 10/26/23   Date Met 08/25/23   PT Goal 3   Goal Identifier Reaching overhead   Goal Description Duane will demonstrate improvement in his symptoms as demonstrated by being able to reach for objects on a high shelf with </= 4/10 pain.   Rationale to maximize safety and independence with performance of ADLs and functional tasks   Goal Progress Progressing towards   Target Date 10/26/23   Subjective Report   Subjective Report His shoulder has been feeling much better. His shoulder is good during the day. He gets the pain the most in the morning when he reaches  "behind him to pull the covers over himself. He has been doing the exercises twice daily and it feels like the strengthening is helping.   Objective Measure 1   Objective Measure R shoulder ROM   Details flexion: 156 degrees, 156 degrees ERP, ER 80 degrees, IR T12   Treatment Interventions (PT)   Interventions Therapeutic Procedure/Exercise;Therapeutic Activity   Therapeutic Procedure/Exercise   Therapeutic Procedures: strength, endurance, ROM, flexibillity minutes (60788) 28   Therapeutic Procedures Ther Proc 2;Ther Proc 3;Ther Proc 4;Ther Proc 5;Ther Proc 6;Ther Proc 7;Ther Proc 8   Ther Proc 1 UBE   Ther Proc 1 - Details 3' forward and 3' back   Ther Proc 2 B ER   Ther Proc 2 - Details GTB  x15   PTRx Ther Proc 3 Rowing with Shoulders Up and Back   PTRx Ther Proc 4 Shoulder Theraband Low Row/Pulldown   Patient Response/Progress Needed cuing for proper scapular movement. Less pain at end of session.   Ther Proc 3 Scap squeezes   Ther Proc 3 - Details x15   Ther Proc 4 Rows   Ther Proc 4 - Details BTB x15   Ther Proc 5 Pec doorway stretch   Ther Proc 5 - Details 3 x30\"   Ther Proc 6 ER AAROM with dowel in 90 abduction   Ther Proc 6 - Details 8\" x10   Ther Proc 7 IR lift up with dowel   Ther Proc 7 - Details x10   Ther Proc 8 Repeated extension with dowel   Ther Proc 8 - Details x10   Therapeutic Activity   Therapeutic Activities: dynamic activities to improve functional performance minutes (12109) 12   Therapeutic Activities Ther Act 2;Ther Act 3   Ther Act 1 Shoulder flexion with isometric ER   Ther Act 1 - Details GTB x15   Ther Act 2 Cactus pushups   Ther Act 2 - Details x15   Ther Act 3 Body blade   Ther Act 3 - Details Body blade push pull, IR/ER: 2 x45\" Flexion: x10   Plan   Home program PTRx   Plan for next session Progress strengthening.   Comments   Comments Duane is showing significant improvement in his symptoms and his overall shoulder function. He still has some minor pain in his shoulder with certain " activities and at end range horizontal abduction. He had decreased pain with the repeated shoulder extension and with the pec stretch. He tolerated the new strengthening exercises well, reporting no increase in pain. Duane will continue to benefit from PT in order to promote increased shoulder strength and stability.   Total Session Time   Timed Code Treatment Minutes 40   Total Treatment Time (sum of timed and untimed services) 40

## 2023-10-04 ENCOUNTER — OFFICE VISIT (OUTPATIENT)
Dept: FAMILY MEDICINE | Facility: CLINIC | Age: 73
End: 2023-10-04
Payer: COMMERCIAL

## 2023-10-04 VITALS
SYSTOLIC BLOOD PRESSURE: 122 MMHG | BODY MASS INDEX: 28.19 KG/M2 | HEIGHT: 70 IN | HEART RATE: 81 BPM | DIASTOLIC BLOOD PRESSURE: 79 MMHG | RESPIRATION RATE: 10 BRPM | WEIGHT: 196.9 LBS | TEMPERATURE: 98 F | OXYGEN SATURATION: 97 %

## 2023-10-04 DIAGNOSIS — Z23 NEED FOR SHINGLES VACCINE: ICD-10-CM

## 2023-10-04 LAB
BASO+EOS+MONOS # BLD AUTO: NORMAL 10*3/UL
BASO+EOS+MONOS NFR BLD AUTO: NORMAL %
BASOPHILS # BLD AUTO: 0 10E3/UL (ref 0–0.2)
BASOPHILS NFR BLD AUTO: 0 %
EOSINOPHIL # BLD AUTO: 0.2 10E3/UL (ref 0–0.7)
EOSINOPHIL NFR BLD AUTO: 3 %
ERYTHROCYTE [DISTWIDTH] IN BLOOD BY AUTOMATED COUNT: 12.3 % (ref 10–15)
HCT VFR BLD AUTO: 49.4 % (ref 40–53)
HGB BLD-MCNC: 16.7 G/DL (ref 13.3–17.7)
IMM GRANULOCYTES # BLD: 0 10E3/UL
IMM GRANULOCYTES NFR BLD: 0 %
LYMPHOCYTES # BLD AUTO: 1.9 10E3/UL (ref 0.8–5.3)
LYMPHOCYTES NFR BLD AUTO: 26 %
MCH RBC QN AUTO: 29.9 PG (ref 26.5–33)
MCHC RBC AUTO-ENTMCNC: 33.8 G/DL (ref 31.5–36.5)
MCV RBC AUTO: 89 FL (ref 78–100)
MONOCYTES # BLD AUTO: 0.6 10E3/UL (ref 0–1.3)
MONOCYTES NFR BLD AUTO: 9 %
NEUTROPHILS # BLD AUTO: 4.5 10E3/UL (ref 1.6–8.3)
NEUTROPHILS NFR BLD AUTO: 62 %
PLATELET # BLD AUTO: 163 10E3/UL (ref 150–450)
POTASSIUM SERPL-SCNC: 4.8 MMOL/L (ref 3.4–5.3)
RBC # BLD AUTO: 5.58 10E6/UL (ref 4.4–5.9)
WBC # BLD AUTO: 7.2 10E3/UL (ref 4–11)

## 2023-10-04 PROCEDURE — 90662 IIV NO PRSV INCREASED AG IM: CPT | Performed by: FAMILY MEDICINE

## 2023-10-04 PROCEDURE — G0008 ADMIN INFLUENZA VIRUS VAC: HCPCS | Performed by: FAMILY MEDICINE

## 2023-10-04 PROCEDURE — 36415 COLL VENOUS BLD VENIPUNCTURE: CPT | Performed by: FAMILY MEDICINE

## 2023-10-04 PROCEDURE — 99214 OFFICE O/P EST MOD 30 MIN: CPT | Mod: 25 | Performed by: FAMILY MEDICINE

## 2023-10-04 PROCEDURE — 84132 ASSAY OF SERUM POTASSIUM: CPT | Performed by: FAMILY MEDICINE

## 2023-10-04 PROCEDURE — 85025 COMPLETE CBC W/AUTO DIFF WBC: CPT | Performed by: FAMILY MEDICINE

## 2023-10-04 ASSESSMENT — PAIN SCALES - GENERAL: PAINLEVEL: MODERATE PAIN (5)

## 2023-10-04 ASSESSMENT — ASTHMA QUESTIONNAIRES: ACT_TOTALSCORE: 25

## 2023-10-04 NOTE — PROGRESS NOTES
[facility]  [address]  [facility] [address]  Phone: [phone]  Fax: [phone]  Primary Provider: Trinidad Costa  Pre-op Performing Provider: TRINIDAD COSTA      PREOPERATIVE EVALUATION:  Today's date: 10/4/2023    Duane is a 73 year old male who presents for a preoperative evaluation.      Surgical Information:  Surgery/Procedure: Spinal decompression   Surgery Location: [facility] [place]   Surgeon: DR Moreno   Surgery Date: 10/12/2023  Time of Surgery: N/A  Where patient plans to recover: At home with family  Fax number for surgical facility: [phone]    Assessment & Plan     The proposed surgical procedure is considered INTERMEDIATE risk.    Need for shingles vaccine      - INFLUENZA VACCINE 65+ (FLUZONE HD)  - CBC with Platelets & Differential; Future  - Potassium; Future  - CBC with Platelets & Differential  - Potassium            - No identified additional risk factors other than previously addressed        RECOMMENDATION:  APPROVAL GIVEN to proceed with proposed procedure, without further diagnostic evaluation.            Subjective       HPI related to upcoming procedure:Duane has been having trouble with low back discomfort and consulted orthopedic surgery.  Imaging showed a bulge in his lumbar spine area.  He is scheduled for repair of the bulge and microdissection under general anesthesia second week of October.  He has been therapeutically optimized for the anticipated procedure.  Appropriate laboratory will be drawn.  All medical questions asked were answered.  Patient is tolerated general anesthesia and other forms of anesthesia 19 times in the last 20 years without any incident        10/4/2023     8:26 AM   Preop Questions   1. Have you ever had a heart attack or stroke? No   2. Have you ever had surgery on your heart or blood vessels, such as a stent placement, a coronary artery bypass, or surgery on an artery in your head, neck, heart, or legs? No    3. Do you have chest pain with activity? No   4. Do you have a history of  heart failure? No   5. Do you currently have a cold, bronchitis or symptoms of other infection? No   6. Do you have a cough, shortness of breath, or wheezing? No   7. Do you or anyone in your family have previous history of blood clots? No   8. Do you or does anyone in your family have a serious bleeding problem such as prolonged bleeding following surgeries or cuts? No   9. Have you ever had problems with anemia or been told to take iron pills? No   10. Have you had any abnormal blood loss such as black, tarry or bloody stools? No   11. Have you ever had a blood transfusion? No   12. Are you willing to have a blood transfusion if it is medically needed before, during, or after your surgery? Yes   13. Have you or any of your relatives ever had problems with anesthesia? No   14. Do you have sleep apnea, excessive snoring or daytime drowsiness? No   15. Do you have any artifical heart valves or other implanted medical devices like a pacemaker, defibrillator, or continuous glucose monitor? No   16. Do you have artificial joints? YES -    17. Are you allergic to latex? No       Health Care Directive:  Patient does not have a Health Care Directive or Living Will: Discussed advance care planning with patient; however, patient declined at this time.    Preoperative Review of :   reviewed - no record of controlled substances prescribed.          Review of Systems  CONSTITUTIONAL: NEGATIVE for fever, chills, change in weight  INTEGUMENTARY/SKIN: NEGATIVE for worrisome rashes, moles or lesions  EYES: NEGATIVE for vision changes or irritation  ENT/MOUTH: NEGATIVE for ear, mouth and throat problems  RESP: NEGATIVE for significant cough or SOB  CV: NEGATIVE for chest pain, palpitations or peripheral edema  GI: NEGATIVE for nausea, abdominal pain, heartburn, or change in bowel habits  : NEGATIVE for frequency, dysuria, or  hematuria  MUSCULOSKELETAL: NEGATIVE for significant arthralgias or myalgia  NEURO: NEGATIVE for weakness, dizziness or paresthesias  ENDOCRINE: NEGATIVE for temperature intolerance, skin/hair changes  HEME: NEGATIVE for bleeding problems  PSYCHIATRIC: NEGATIVE for changes in mood or affect  Patient Active Problem List    Diagnosis Date Noted    Acute pain of right shoulder 07/28/2023     Priority: Medium    History of colonic polyps 11/14/2022     Priority: Medium    Benign neoplasm of ascending colon 11/14/2022     Priority: Medium    Asthma      Priority: Medium     Created by Conversion        Overweight      Priority: Medium     Created by Conversion        Cervical nerve root compression 04/27/2018     Priority: Medium    Benign neoplasm of cecum 11/02/2017     Priority: Medium    Polyp of colon 10/31/2017     Priority: Medium     Created by Conversion  Replacement Utility updated for latest IMO load        Hemorrhoids 10/31/2017     Priority: Medium    Hyperlipemia      Priority: Medium     Created by Conversion        Enthesopathy Of The Left Knee      Priority: Medium     Created by Conversion  Replacement Utility updated for latest IMO load        Diverticulosis      Priority: Medium     Created by Conversion  Replacement Utility updated for latest IMO load        Diverticula, colon 08/06/2015     Priority: Medium    Diverticulitis with perforation 07/18/2015     Priority: Medium     Replacement Utility updated for latest IMO load        Esophageal reflux      Priority: Medium     Created by Conversion        Benign Essential Hypertension      Priority: Medium     Created by Conversion        Inflammatory arthritis 04/22/2015     Priority: Medium    Arthralgia 04/08/2015     Priority: Medium    Polyarthropathy 04/08/2015     Priority: Medium    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;  Service:     CERVICAL SPINE SURGERY  2000    COLON SURGERY  2015    sigmoid resection for diverticulitis    FRACTURE SURGERY      HC REMOVAL PREPATELLA BURSA Left     Description: Excision Of Prepatellar Bursa;  Recorded: 05/12/2014;    JOINT REPLACEMENT Right     right TKA    LUMBAR SPINE SURGERY      X2    OTHER SURGICAL HISTORY      fistula reemoval intestines    OTHER SURGICAL HISTORY      rectal fistula repairmultiple    PICC  8/13/2015         RECTAL PROLAPSE REPAIR N/A 8/6/2015    Procedure: SIGMOID RESECTION;  Surgeon: Juan Ramon Carrion MD;  Location: [facility];  Service:      Current Outpatient Medications   Medication Sig Dispense Refill    albuterol (PROAIR HFA/PROVENTIL HFA/VENTOLIN HFA) 108 (90 Base) MCG/ACT inhaler INHALE 2 PUFFS BY MOUTH EVERY 4 HOURS AS NEEDED FOR WHEEZING. 8.5 g 3    amoxicillin (AMOXIL) 500 MG capsule Take 1 capsule (500 mg) by mouth 4 times daily Take 4 capsules (2,000 mg) by mouth prior to dental appointment. 4 capsule 3    aspirin (ASA) 81 MG chewable tablet CHEW AND SWALLOW 2 TABLETS BY MOUTH DAILY (Patient not taking: Reported on 5/17/2023)      b complex vitamins tablet Take 1 tablet by mouth daily      budesonide-formoterol (SYMBICORT) 80-4.5 MCG/ACT Inhaler       gabapentin (NEURONTIN) 300 MG capsule Take 1 capsule (300 mg) by mouth daily 30 capsule 3    glucosamine-chondroitin 500-400 mg cap [GLUCOSAMINE-CHONDROITIN 500-400 MG CAP] Take 2 capsules by mouth daily.       hydrOXYzine (ATARAX) 25 MG tablet take 1 tablet by mouth every 4 to 6 hours as needed for pain      omeprazole  (PRILOSEC) 20 MG capsule Take 20 mg by mouth daily as needed      oxyCODONE (ROXICODONE) 5 MG tablet take 1 to 2 tablets by mouth every 4 to 6 hours as needed for pain (Patient not taking: Reported on 2023)      rivaroxaban ANTICOAGULANT (XARELTO) 15 MG TABS tablet Take 15 mg by mouth         Allergies   Allergen Reactions    Statins-Hmg-Coa Reductase Inhibitors [Statins] Muscle Pain (Myalgia)    Niacin Rash     Rash all over with high dose of niacin  Rash all over with high dose of niacin          Social History     Tobacco Use    Smoking status: Former     Types: Cigarettes     Quit date: 1994     Years since quittin.7    Smokeless tobacco: Never   Substance Use Topics    Alcohol use: Yes     Comment: Alcoholic Drinks/day: occasionally        History   Drug Use No         Objective     There were no vitals taken for this visit.    Physical Exam    GENERAL APPEARANCE: healthy, alert and no distress     EYES: EOMI,  PERRL     HENT: ear canals and TM's normal and nose and mouth without ulcers or lesions     NECK: no adenopathy, no asymmetry, masses, or scars and thyroid normal to palpation     RESP: lungs clear to auscultation - no rales, rhonchi or wheezes     CV: regular rates and rhythm, normal S1 S2, no S3 or S4 and no murmur, click or rub     ABDOMEN:  soft, nontender, no HSM or masses and bowel sounds normal     MS: extremities normal- no gross deformities noted, no evidence of inflammation in joints, FROM in all extremities.     SKIN: no suspicious lesions or rashes     NEURO: Normal strength and tone, sensory exam grossly normal, mentation intact and speech normal     PSYCH: mentation appears normal. and affect normal/bright     LYMPHATICS: No cervical adenopathy    Recent Labs   Lab Test 23  1133 23  1256   HGB  --  16.7   PLT  --  169   NA  --  141   POTASSIUM  --  4.3   CR  --  1.04   A1C 6.1*  --         Diagnostics:  Labs pending at this time.  Results will be reviewed when  available.   No EKG required for low risk surgery (cataract, skin procedure, breast biopsy, etc).    Revised Cardiac Risk Index (RCRI):  The patient has the following serious cardiovascular risks for perioperative complications:       RCRI Interpretation: 1 point: Class II (low risk - 0.9% complication rate)         Signed Electronically by: Misael Costa MD  Copy of this evaluation report is provided to requesting physician.

## 2023-10-19 DIAGNOSIS — M79.18 MYOFASCIAL PAIN: ICD-10-CM

## 2023-10-19 DIAGNOSIS — M54.2 CERVICAL SPINE PAIN: ICD-10-CM

## 2023-10-19 RX ORDER — GABAPENTIN 300 MG/1
300 CAPSULE ORAL DAILY
Qty: 30 CAPSULE | Refills: 3 | Status: SHIPPED | OUTPATIENT
Start: 2023-10-19 | End: 2023-12-20

## 2023-10-19 NOTE — TELEPHONE ENCOUNTER
Pharmacy sent refill request for gabapentin   --Med last Rx 6/16/23 #30, 3 refills   --Last OV 7/10/23 with Dr. Teixeira, EMG 7/26/23  --Future appt: none  --Please advise

## 2023-12-20 DIAGNOSIS — M79.18 MYOFASCIAL PAIN: ICD-10-CM

## 2023-12-20 DIAGNOSIS — M54.2 CERVICAL SPINE PAIN: ICD-10-CM

## 2023-12-20 RX ORDER — GABAPENTIN 300 MG/1
300 CAPSULE ORAL DAILY
Qty: 270 CAPSULE | Refills: 1 | Status: SHIPPED | OUTPATIENT
Start: 2023-12-20

## 2023-12-20 NOTE — TELEPHONE ENCOUNTER
Upon chart review 90 day supply sent with 1 refill. Called pt and informed him of this. He stated understanding.

## 2023-12-20 NOTE — TELEPHONE ENCOUNTER
[facility] Call Center    Phone Message    May a detailed message be left on voicemail: yes     Reason for Call: Other: Patient's spouse is calling in to obtain a enough refills for while they are away in [place]. Patient will be gone until April.      Action Taken: Other: [phone]    Travel Screening: Not Applicable

## 2023-12-20 NOTE — TELEPHONE ENCOUNTER
Requesting for 90 day supply    Pharmacy sent refill request for gabapentin 300 cap, take 1 cap daily   --Med last Rx 10/19/23 #30, 3 refills   --Last OV 7/26/23 with Dr. Teixeira   --Future appt: none  --Please advise 90 day supply

## 2024-04-16 ENCOUNTER — OFFICE VISIT (OUTPATIENT)
Dept: FAMILY MEDICINE | Facility: CLINIC | Age: 74
End: 2024-04-16
Payer: COMMERCIAL

## 2024-04-16 VITALS
WEIGHT: 197.1 LBS | HEART RATE: 71 BPM | SYSTOLIC BLOOD PRESSURE: 131 MMHG | BODY MASS INDEX: 28.22 KG/M2 | DIASTOLIC BLOOD PRESSURE: 76 MMHG | HEIGHT: 70 IN | TEMPERATURE: 96.8 F | OXYGEN SATURATION: 97 % | RESPIRATION RATE: 15 BRPM

## 2024-04-16 DIAGNOSIS — Z12.5 ENCOUNTER FOR SCREENING FOR MALIGNANT NEOPLASM OF PROSTATE: ICD-10-CM

## 2024-04-16 DIAGNOSIS — R20.2 BILATERAL LEG PARESTHESIA: ICD-10-CM

## 2024-04-16 DIAGNOSIS — R07.89 ATYPICAL CHEST PAIN: ICD-10-CM

## 2024-04-16 DIAGNOSIS — Z00.00 ENCOUNTER FOR MEDICARE ANNUAL WELLNESS EXAM: Primary | ICD-10-CM

## 2024-04-16 DIAGNOSIS — Z23 ENCOUNTER FOR IMMUNIZATION: ICD-10-CM

## 2024-04-16 LAB
ALBUMIN UR-MCNC: NEGATIVE MG/DL
APPEARANCE UR: CLEAR
BILIRUB UR QL STRIP: NEGATIVE
COLOR UR AUTO: YELLOW
ERYTHROCYTE [DISTWIDTH] IN BLOOD BY AUTOMATED COUNT: 12.3 % (ref 10–15)
GLUCOSE UR STRIP-MCNC: NEGATIVE MG/DL
HCT VFR BLD AUTO: 51.6 % (ref 40–53)
HGB BLD-MCNC: 17.3 G/DL (ref 13.3–17.7)
HGB UR QL STRIP: NEGATIVE
KETONES UR STRIP-MCNC: NEGATIVE MG/DL
LEUKOCYTE ESTERASE UR QL STRIP: NEGATIVE
MCH RBC QN AUTO: 30.6 PG (ref 26.5–33)
MCHC RBC AUTO-ENTMCNC: 33.5 G/DL (ref 31.5–36.5)
MCV RBC AUTO: 91 FL (ref 78–100)
NITRATE UR QL: NEGATIVE
PH UR STRIP: 5.5 [PH] (ref 5–8)
PLATELET # BLD AUTO: 171 10E3/UL (ref 150–450)
RBC # BLD AUTO: 5.66 10E6/UL (ref 4.4–5.9)
SP GR UR STRIP: >=1.03 (ref 1–1.03)
UROBILINOGEN UR STRIP-ACNC: 0.2 E.U./DL
WBC # BLD AUTO: 9.6 10E3/UL (ref 4–11)

## 2024-04-16 PROCEDURE — 36415 COLL VENOUS BLD VENIPUNCTURE: CPT | Performed by: FAMILY MEDICINE

## 2024-04-16 PROCEDURE — 85027 COMPLETE CBC AUTOMATED: CPT | Performed by: FAMILY MEDICINE

## 2024-04-16 PROCEDURE — G0103 PSA SCREENING: HCPCS | Performed by: FAMILY MEDICINE

## 2024-04-16 PROCEDURE — G0439 PPPS, SUBSEQ VISIT: HCPCS | Performed by: FAMILY MEDICINE

## 2024-04-16 PROCEDURE — 90480 ADMN SARSCOV2 VAC 1/ONLY CMP: CPT | Performed by: FAMILY MEDICINE

## 2024-04-16 PROCEDURE — 84165 PROTEIN E-PHORESIS SERUM: CPT | Performed by: PATHOLOGY

## 2024-04-16 PROCEDURE — 81003 URINALYSIS AUTO W/O SCOPE: CPT | Performed by: FAMILY MEDICINE

## 2024-04-16 PROCEDURE — 91320 SARSCV2 VAC 30MCG TRS-SUC IM: CPT | Performed by: FAMILY MEDICINE

## 2024-04-16 PROCEDURE — 80061 LIPID PANEL: CPT | Performed by: FAMILY MEDICINE

## 2024-04-16 PROCEDURE — 84155 ASSAY OF PROTEIN SERUM: CPT | Performed by: FAMILY MEDICINE

## 2024-04-16 PROCEDURE — 80053 COMPREHEN METABOLIC PANEL: CPT | Performed by: FAMILY MEDICINE

## 2024-04-16 PROCEDURE — 83921 ORGANIC ACID SINGLE QUANT: CPT | Performed by: FAMILY MEDICINE

## 2024-04-16 RX ORDER — RESPIRATORY SYNCYTIAL VIRUS VACCINE 120MCG/0.5
0.5 KIT INTRAMUSCULAR ONCE
Qty: 1 EACH | Refills: 0 | Status: CANCELLED | OUTPATIENT
Start: 2024-04-16 | End: 2024-04-16

## 2024-04-16 RX ORDER — OMEGA-3/DHA/EPA/FISH OIL 60 MG-90MG
CAPSULE ORAL
COMMUNITY

## 2024-04-16 SDOH — HEALTH STABILITY: PHYSICAL HEALTH: ON AVERAGE, HOW MANY DAYS PER WEEK DO YOU ENGAGE IN MODERATE TO STRENUOUS EXERCISE (LIKE A BRISK WALK)?: 4 DAYS

## 2024-04-16 ASSESSMENT — ASTHMA QUESTIONNAIRES
QUESTION_3 LAST FOUR WEEKS HOW OFTEN DID YOUR ASTHMA SYMPTOMS (WHEEZING, COUGHING, SHORTNESS OF BREATH, CHEST TIGHTNESS OR PAIN) WAKE YOU UP AT NIGHT OR EARLIER THAN USUAL IN THE MORNING: ONCE OR TWICE
QUESTION_1 LAST FOUR WEEKS HOW MUCH OF THE TIME DID YOUR ASTHMA KEEP YOU FROM GETTING AS MUCH DONE AT WORK, SCHOOL OR AT HOME: NONE OF THE TIME
QUESTION_5 LAST FOUR WEEKS HOW WOULD YOU RATE YOUR ASTHMA CONTROL: NOT CONTROLLED AT ALL
ACT_TOTALSCORE: 19
ACT_TOTALSCORE: 19
QUESTION_4 LAST FOUR WEEKS HOW OFTEN HAVE YOU USED YOUR RESCUE INHALER OR NEBULIZER MEDICATION (SUCH AS ALBUTEROL): NOT AT ALL
QUESTION_2 LAST FOUR WEEKS HOW OFTEN HAVE YOU HAD SHORTNESS OF BREATH: ONCE OR TWICE A WEEK

## 2024-04-16 ASSESSMENT — PAIN SCALES - GENERAL: PAINLEVEL: NO PAIN (0)

## 2024-04-16 ASSESSMENT — SOCIAL DETERMINANTS OF HEALTH (SDOH): HOW OFTEN DO YOU GET TOGETHER WITH FRIENDS OR RELATIVES?: TWICE A WEEK

## 2024-04-16 NOTE — PROGRESS NOTES
"Preventive Care Visit  [facility]  Misael Costa MD, Family Medicine  Apr 16, 2024      Assessment & Plan     Encounter for Medicare annual wellness exam  Workup to include  - Lipid panel reflex to direct LDL Non-fasting; Future  - CBC with platelets; Future  - Comprehensive metabolic panel; Future  - Prostate Specific Antigen Screen; Future  - UA Macroscopic with reflex to Microscopic and Culture; Future  - Lipid panel reflex to direct LDL Non-fasting  - CBC with platelets  - Comprehensive metabolic panel  - Prostate Specific Antigen Screen  - UA Macroscopic with reflex to Microscopic and Culture    Encounter for immunization  Updated as follows  - COVID-19 12+ (2023-24) (PFIZER)    Encounter for screening for malignant neoplasm of prostate  Repeat PSA  - Prostate Specific Antigen Screen; Future  - Prostate Specific Antigen Screen    Atypical chest pain  Reschedule new study  - NM Lexiscan stress test; Future    Bilateral leg paresthesia  Follow along with orthopedic surgery  - Methylmalonic Acid  - Protein electrophoresis    Patient has been advised of split billing requirements and indicates understanding: Yes          BMI  Estimated body mass index is 28.38 kg/m  as calculated from the following:    Height as of this encounter: 1.775 m (5' 9.88\").    Weight as of this encounter: 89.4 kg (197 lb 1.6 oz).       Counseling  Appropriate preventive services were discussed with this patient, including applicable screening as appropriate for fall prevention, nutrition, physical activity, Tobacco-use cessation, weight loss and cognition.  Checklist reviewing preventive services available has been given to the patient.  Reviewed patient's diet, addressing concerns and/or questions.   Discussed possible causes of fatigue. Patient reported safety concerns were addressed today.The patient was provided with written information regarding signs of hearing loss.           Subjective   Duane is a 73 year " old, presenting for the following:  Physical and Chest Pain (Occasional burning feeling)        4/16/2024    10:25 AM   Additional Questions   Roomed by Suzi         Health Care Directive  Patient does not have a Health Care Directive or Living Will: Patient states has Advance Directive and will bring in a copy to clinic.    HPI  History of present illness and assessment and plan: Freddie presents for his annual wellness exam however when he was vacationing in [place] he is experiencing some intermittent chest pain when he walks an hour sometimes winds up to hold his on his home in [place].  He is also experiencing some shortness of breath and some burning sensation in his chest doing some spring cleaning up around his house.  He has had a stress echo and nuclear study 3 years ago which was benign I think we need to repeat that I will place that order.  He is also having some mild bilateral back pain which is being handled by [facility] at present.  He needs immunizations for his COVID status.  Also prostate check.  All medical questions asked were answered I have personally reviewed family social history surgeries allergies she has problems list.  Fall risk is minimal.  PHQ-9 RUPALI reviewed today.          4/16/2024   General Health   How would you rate your overall physical health? Good   Feel stress (tense, anxious, or unable to sleep) Not at all         4/16/2024   Nutrition   Diet: Regular (no restrictions)         4/16/2024   Exercise   Days per week of moderate/strenous exercise 4 days         4/16/2024   Social Factors   Frequency of gathering with friends or relatives Twice a week   Worry food won't last until get money to buy more No   Food not last or not have enough money for food? No   Do you have housing?  Yes   Are you worried about losing your housing? No   Lack of transportation? No   Unable to get utilities (heat,electricity)? No         4/16/2024   Fall Risk   Fallen 2 or more times in the  past year? No   Trouble with walking or balance? Yes   Gait Speed Test (Document in seconds) 4   Gait Speed Test Interpretation Less than or equal to 5.00 seconds - PASS          2024   Activities of Daily Living- Home Safety   Needs help with the following daily activites None of the above   Safety concerns in the home No grab bars in the bathroom         2024   Dental   Dentist two times every year? Yes         2024   Hearing Screening   Hearing concerns? (!) TROUBLE UNDERSTANDING SPEECH ON THE TELEPHONE         2024   Driving Risk Screening   Patient/family members have concerns about driving No         2024   General Alertness/Fatigue Screening   Have you been more tired than usual lately? (!) YES         2024   Urinary Incontinence Screening   Bothered by leaking urine in past 6 months No         2024   TB Screening   Were you born outside of the [place]? No         Today's PHQ-2 Score:       2024    10:06 AM   PHQ-2 (  [employer])   Q1: Little interest or pleasure in doing things 0   Q2: Feeling down, depressed or hopeless 0   PHQ-2 Score 0   Q1: Little interest or pleasure in doing things Not at all   Q2: Feeling down, depressed or hopeless Not at all   PHQ-2 Score 0           2024   Substance Use   Alcohol more than 3/day or more than 7/wk No   Do you have a current opioid prescription? No   How severe/bad is pain from 1 to 10? 2/10   Do you use any other substances recreationally? (!) ALCOHOL    (!) PRESCRIPTION DRUGS     Social History     Tobacco Use    Smoking status: Former     Current packs/day: 0.00     Types: Cigarettes     Quit date: 1994     Years since quittin.3    Smokeless tobacco: Never   Vaping Use    Vaping status: Never Used   Substance Use Topics    Alcohol use: Yes     Comment: Alcoholic Drinks/day: occasionally     Drug use: No       ASCVD Risk   The 10-year ASCVD risk score (Gabo DODGE, et al., 2019) is: 28%    Values used to  calculate the score:      Age: 73 years      Sex: Male      Is Non- : No      Diabetic: No      Tobacco smoker: No      Systolic Blood Pressure: 131 mmHg      Is BP treated: No      HDL Cholesterol: 33 mg/dL      Total Cholesterol: 209 mg/dL            Reviewed and updated as needed this visit by Provider                    Lab work is in process  Current providers sharing in care for this patient include:  Patient Care Team:  Misael Costa MD as PCP - General (Family Practice)  Ingrid Kruger PharmD as Pharmacist (Pharmacist)  Luis A Teixeira DO as Assigned Neuroscience Provider  Misael Costa MD as Assigned PCP    The following health maintenance items are reviewed in Epic and correct as of today:  Health Maintenance   Topic Date Due    ANNUAL REVIEW OF  ORDERS  Never done    ASTHMA ACTION PLAN  Never done    RSV VACCINE (Pregnancy & 60+) (1 - 1-dose 60+ series) Never done    MEDICARE ANNUAL WELLNESS VISIT  Never done    ZOSTER IMMUNIZATION (2 of 2) 02/04/2015    LIPID  08/03/2023    COVID-19 Vaccine (5 - 2023-24 season) 09/01/2023    ASTHMA CONTROL TEST  10/16/2024    FALL RISK ASSESSMENT  04/16/2025    ADVANCE CARE PLANNING  11/12/2025    GLUCOSE  04/13/2026    DTAP/TDAP/TD IMMUNIZATION (3 - Td or Tdap) 05/11/2028    COLORECTAL CANCER SCREENING  11/10/2032    HEPATITIS C SCREENING  Completed    PHQ-2 (once per calendar year)  Completed    INFLUENZA VACCINE  Completed    Pneumococcal Vaccine: 65+ Years  Completed    AORTIC ANEURYSM SCREENING (SYSTEM ASSIGNED)  Completed    IPV IMMUNIZATION  Aged Out    HPV IMMUNIZATION  Aged Out    MENINGITIS IMMUNIZATION  Aged Out    RSV MONOCLONAL ANTIBODY  Aged Out         Review of Systems  CONSTITUTIONAL: NEGATIVE for fever, chills, change in weight  INTEGUMENTARY/SKIN: NEGATIVE for worrisome rashes, moles or lesions  EYES: NEGATIVE for vision changes or irritation  ENT/MOUTH: NEGATIVE for ear, mouth and throat problems  RESP: NEGATIVE for  "significant cough or SOB  BREAST: NEGATIVE for masses, tenderness or discharge  CV: NEGATIVE for chest pain, palpitations or peripheral edema  GI: NEGATIVE for nausea, abdominal pain, heartburn, or change in bowel habits  : NEGATIVE for frequency, dysuria, or hematuria  MUSCULOSKELETAL: NEGATIVE for significant arthralgias or myalgia  NEURO: NEGATIVE for weakness, dizziness or paresthesias  ENDOCRINE: NEGATIVE for temperature intolerance, skin/hair changes  HEME: NEGATIVE for bleeding problems  PSYCHIATRIC: NEGATIVE for changes in mood or affect     Objective    Exam  /76 (BP Location: Right arm, Patient Position: Sitting, Cuff Size: Adult Regular)   Pulse 71   Temp 96.8  F (36  C) (Temporal)   Resp 15   Ht 1.775 m (5' 9.88\")   Wt 89.4 kg (197 lb 1.6 oz)   SpO2 97%   BMI 28.38 kg/m     Estimated body mass index is 28.38 kg/m  as calculated from the following:    Height as of this encounter: 1.775 m (5' 9.88\").    Weight as of this encounter: 89.4 kg (197 lb 1.6 oz).    Physical Exam  GENERAL: alert and no distress  EYES: Eyes grossly normal to inspection, PERRL and conjunctivae and sclerae normal  HENT: ear canals and TM's normal, nose and mouth without ulcers or lesions  NECK: no adenopathy, no asymmetry, masses, or scars  RESP: lungs clear to auscultation - no rales, rhonchi or wheezes  CV: regular rate and rhythm, normal S1 S2, no S3 or S4, no murmur, click or rub, no peripheral edema  ABDOMEN: soft, nontender, no hepatosplenomegaly, no masses and bowel sounds normal  MS: no gross musculoskeletal defects noted, no edema  SKIN: no suspicious lesions or rashes  NEURO: Normal strength and tone, mentation intact and speech normal  PSYCH: mentation appears normal, affect normal/bright         No data to display                       Signed Electronically by: Misael Costa MD    "

## 2024-04-17 ENCOUNTER — PATIENT OUTREACH (OUTPATIENT)
Dept: GASTROENTEROLOGY | Facility: CLINIC | Age: 74
End: 2024-04-17
Payer: COMMERCIAL

## 2024-04-17 DIAGNOSIS — Z12.11 SPECIAL SCREENING FOR MALIGNANT NEOPLASMS, COLON: Primary | ICD-10-CM

## 2024-04-17 LAB
ALBUMIN SERPL BCG-MCNC: 4.8 G/DL (ref 3.5–5.2)
ALBUMIN SERPL ELPH-MCNC: 4.7 G/DL (ref 3.7–5.1)
ALP SERPL-CCNC: 62 U/L (ref 40–150)
ALPHA1 GLOB SERPL ELPH-MCNC: 0.2 G/DL (ref 0.2–0.4)
ALPHA2 GLOB SERPL ELPH-MCNC: 0.7 G/DL (ref 0.5–0.9)
ALT SERPL W P-5'-P-CCNC: 44 U/L (ref 0–70)
ANION GAP SERPL CALCULATED.3IONS-SCNC: 11 MMOL/L (ref 7–15)
AST SERPL W P-5'-P-CCNC: 34 U/L (ref 0–45)
B-GLOBULIN SERPL ELPH-MCNC: 0.8 G/DL (ref 0.6–1)
BILIRUB SERPL-MCNC: 0.5 MG/DL
BUN SERPL-MCNC: 15.2 MG/DL (ref 8–23)
CALCIUM SERPL-MCNC: 9.6 MG/DL (ref 8.8–10.2)
CHLORIDE SERPL-SCNC: 102 MMOL/L (ref 98–107)
CHOLEST SERPL-MCNC: 243 MG/DL
CREAT SERPL-MCNC: 0.92 MG/DL (ref 0.67–1.17)
DEPRECATED HCO3 PLAS-SCNC: 28 MMOL/L (ref 22–29)
EGFRCR SERPLBLD CKD-EPI 2021: 87 ML/MIN/1.73M2
FASTING STATUS PATIENT QL REPORTED: YES
GAMMA GLOB SERPL ELPH-MCNC: 0.8 G/DL (ref 0.7–1.6)
GLUCOSE SERPL-MCNC: 94 MG/DL (ref 70–99)
HDLC SERPL-MCNC: 46 MG/DL
LDLC SERPL CALC-MCNC: 146 MG/DL
M PROTEIN SERPL ELPH-MCNC: 0 G/DL
NONHDLC SERPL-MCNC: 197 MG/DL
PATH REPORT.COMMENTS IMP SPEC: NORMAL
POTASSIUM SERPL-SCNC: 4.3 MMOL/L (ref 3.4–5.3)
PROT PATTERN SERPL ELPH-IMP: NORMAL
PROT SERPL-MCNC: 7.6 G/DL (ref 6.4–8.3)
PSA SERPL DL<=0.01 NG/ML-MCNC: 1.43 NG/ML (ref 0–6.5)
SODIUM SERPL-SCNC: 141 MMOL/L (ref 135–145)
TOTAL PROTEIN SERUM FOR ELP: 7.2 G/DL (ref 6.4–8.3)
TRIGL SERPL-MCNC: 255 MG/DL

## 2024-04-17 NOTE — PROGRESS NOTES
"[facility] Screening Colonoscopy Referral Review    Patient meets the inclusion criteria for screening colonoscopy standing order.    Ordering/Referring Provider:  Misael Costa      BMI: Estimated body mass index is 28.38 kg/m  as calculated from the following:    Height as of 4/16/24: 1.775 m (5' 9.88\").    Weight as of 4/16/24: 89.4 kg (197 lb 1.6 oz).     Sedation:  Does patient have any of the following conditions affecting sedation?  No medical conditions affecting sedation.    Previous Scopes:  Any previous recommendations or follow up needs based on previous scope?  na / No recommendations.    Medical Concerns to Postpone Order:  Does patient have any of the following medical concerns that should postpone/delay colonoscopy referral?  No medical conditions affecting colonoscopy referral.    Final Referral Details:  Based on patient's medical history patient is appropriate for referral order with moderate sedation. If patient's BMI > 50 do not schedule in [facility].  "

## 2024-04-23 LAB — METHYLMALONATE SERPL-SCNC: 0.23 UMOL/L (ref 0–0.4)

## 2024-05-07 ENCOUNTER — HOSPITAL ENCOUNTER (OUTPATIENT)
Dept: NUCLEAR MEDICINE | Facility: CLINIC | Age: 74
Discharge: HOME | End: 2024-05-07
Attending: FAMILY MEDICINE
Payer: COMMERCIAL

## 2024-05-07 ENCOUNTER — HOSPITAL ENCOUNTER (OUTPATIENT)
Dept: CARDIOLOGY | Facility: CLINIC | Age: 74
Discharge: HOME | End: 2024-05-07
Attending: FAMILY MEDICINE
Payer: COMMERCIAL

## 2024-05-07 DIAGNOSIS — R07.89 ATYPICAL CHEST PAIN: ICD-10-CM

## 2024-05-07 LAB
CV BLOOD PRESSURE: 70 MMHG
CV STRESS CURRENT BP HE: NORMAL
CV STRESS CURRENT HR HE: 101
CV STRESS CURRENT HR HE: 101
CV STRESS CURRENT HR HE: 102
CV STRESS CURRENT HR HE: 102
CV STRESS CURRENT HR HE: 103
CV STRESS CURRENT HR HE: 104
CV STRESS CURRENT HR HE: 105
CV STRESS CURRENT HR HE: 106
CV STRESS CURRENT HR HE: 81
CV STRESS CURRENT HR HE: 81
CV STRESS CURRENT HR HE: 92
CV STRESS CURRENT HR HE: 95
CV STRESS CURRENT HR HE: 98
CV STRESS CURRENT HR HE: 99
CV STRESS DEVIATION TIME HE: NORMAL
CV STRESS ECHO PERCENT HR HE: NORMAL
CV STRESS EXERCISE STAGE HE: NORMAL
CV STRESS FINAL RESTING BP HE: NORMAL
CV STRESS FINAL RESTING HR HE: 99
CV STRESS MAX HR HE: 106
CV STRESS MAX TREADMILL GRADE HE: 0
CV STRESS MAX TREADMILL SPEED HE: 0
CV STRESS PEAK DIA BP HE: NORMAL
CV STRESS PEAK SYS BP HE: NORMAL
CV STRESS PHASE HE: NORMAL
CV STRESS PROTOCOL HE: NORMAL
CV STRESS RESTING PT POSITION HE: NORMAL
CV STRESS ST DEVIATION AMOUNT HE: NORMAL
CV STRESS ST DEVIATION ELEVATION HE: NORMAL
CV STRESS ST EVELATION AMOUNT HE: NORMAL
CV STRESS TEST TYPE HE: NORMAL
CV STRESS TOTAL STAGE TIME MIN 1 HE: NORMAL
RATE PRESSURE PRODUCT: NORMAL
STRESS ECHO BASELINE DIASTOLIC HE: 71
STRESS ECHO BASELINE HR: 79
STRESS ECHO BASELINE SYSTOLIC BP: 137
STRESS ECHO CALCULATED PERCENT HR: 73 %
STRESS ECHO LAST STRESS DIASTOLIC BP: 77
STRESS ECHO LAST STRESS HR: 102
STRESS ECHO LAST STRESS SYSTOLIC BP: 122
STRESS ECHO TARGET HR: 145

## 2024-05-07 PROCEDURE — A9500 TC99M SESTAMIBI: HCPCS | Performed by: FAMILY MEDICINE

## 2024-05-07 PROCEDURE — 93018 CV STRESS TEST I&R ONLY: CPT | Performed by: INTERNAL MEDICINE

## 2024-05-07 PROCEDURE — 343N000001 HC RX 343: Performed by: FAMILY MEDICINE

## 2024-05-07 PROCEDURE — 93016 CV STRESS TEST SUPVJ ONLY: CPT | Performed by: INTERNAL MEDICINE

## 2024-05-07 PROCEDURE — 250N000011 HC RX IP 250 OP 636: Mod: JZ | Performed by: FAMILY MEDICINE

## 2024-05-07 PROCEDURE — 78452 HT MUSCLE IMAGE SPECT MULT: CPT

## 2024-05-07 PROCEDURE — 93017 CV STRESS TEST TRACING ONLY: CPT

## 2024-05-07 PROCEDURE — 78452 HT MUSCLE IMAGE SPECT MULT: CPT | Mod: 26 | Performed by: INTERNAL MEDICINE

## 2024-05-07 RX ORDER — AMINOPHYLLINE 25 MG/ML
50 INJECTION, SOLUTION INTRAVENOUS
Status: DISCONTINUED | OUTPATIENT
Start: 2024-05-07 | End: 2024-05-07 | Stop reason: HOSPADM

## 2024-05-07 RX ORDER — REGADENOSON 0.08 MG/ML
0.4 INJECTION, SOLUTION INTRAVENOUS ONCE
Status: COMPLETED | OUTPATIENT
Start: 2024-05-07 | End: 2024-05-07

## 2024-05-07 RX ADMIN — REGADENOSON 0.4 MG: 0.08 INJECTION, SOLUTION INTRAVENOUS at 08:15

## 2024-05-07 RX ADMIN — Medication 8.2 MILLICURIE: at 07:24

## 2024-05-07 RX ADMIN — Medication 31.8 MILLICURIE: at 08:20

## 2024-05-07 NOTE — PROGRESS NOTES
Pt has hx of chest pain while in [place] states was sharp.  Pt denies burning chest pain.  Pt states with shortness of breath with sharp chest pain.  Denies any sx since the end of March.  Glenys Leyva RN

## 2024-05-14 ENCOUNTER — TELEPHONE (OUTPATIENT)
Dept: FAMILY MEDICINE | Facility: CLINIC | Age: 74
End: 2024-05-14
Payer: COMMERCIAL

## 2024-05-14 NOTE — TELEPHONE ENCOUNTER
Test Results    Contacts         Type Contact Phone/Fax    05/14/2024 12:39 PM CDT Phone (Incoming) Duane Carreno (Self) [phone] (M)            Who ordered the test:  Dr. Ramos     Type of test: Lab and Nuclear stress test     Date of test:  5/7/24    Where was the test performed:  [facility]     What are your questions/concerns?:  Calling to get test results     Could we send this information to you in [employer] [place] or would you prefer to receive a phone call?:   Patient would prefer a phone call   Okay to leave a detailed message?: Yes at Cell number on file:    Telephone Information:   Mobile [phone]

## 2024-06-17 ENCOUNTER — TRANSFERRED RECORDS (OUTPATIENT)
Dept: HEALTH INFORMATION MANAGEMENT | Facility: CLINIC | Age: 74
End: 2024-06-17
Payer: COMMERCIAL

## 2024-07-25 ENCOUNTER — OFFICE VISIT (OUTPATIENT)
Dept: FAMILY MEDICINE | Facility: CLINIC | Age: 74
End: 2024-07-25
Payer: COMMERCIAL

## 2024-07-25 ENCOUNTER — TELEPHONE (OUTPATIENT)
Dept: FAMILY MEDICINE | Facility: CLINIC | Age: 74
End: 2024-07-25

## 2024-07-25 VITALS
SYSTOLIC BLOOD PRESSURE: 128 MMHG | HEART RATE: 82 BPM | RESPIRATION RATE: 18 BRPM | OXYGEN SATURATION: 98 % | HEIGHT: 70 IN | WEIGHT: 195 LBS | DIASTOLIC BLOOD PRESSURE: 74 MMHG | BODY MASS INDEX: 27.92 KG/M2 | TEMPERATURE: 98.6 F

## 2024-07-25 DIAGNOSIS — H60.502 ACUTE NONINFECTIVE OTITIS EXTERNA OF LEFT EAR, UNSPECIFIED TYPE: Primary | ICD-10-CM

## 2024-07-25 DIAGNOSIS — H65.92 LEFT SEROUS OTITIS MEDIA, UNSPECIFIED CHRONICITY: ICD-10-CM

## 2024-07-25 PROCEDURE — 99213 OFFICE O/P EST LOW 20 MIN: CPT | Performed by: FAMILY MEDICINE

## 2024-07-25 RX ORDER — MOMETASONE FUROATE MONOHYDRATE 50 UG/1
2 SPRAY, METERED NASAL DAILY
Qty: 17 G | Refills: 3 | Status: SHIPPED | OUTPATIENT
Start: 2024-07-25

## 2024-07-25 ASSESSMENT — PAIN SCALES - GENERAL: PAINLEVEL: NO PAIN (0)

## 2024-07-25 NOTE — PROGRESS NOTES
"  Assessment & Plan     Acute noninfective otitis externa of left ear, unspecified type  Patient was instructed to use this medication for least 3 weeks  - mometasone (NASONEX) 50 MCG/ACT nasal spray; Spray 2 sprays into both nostrils daily    Left serous otitis media, unspecified chronicity  See above note                Subjective   Duane is a 74 year old, presenting for the following health issues:  Ear Problem    HPI Duane noticed a pounding in his left ear he does not have any increased hearing loss in that ear.  We examined him and he does have some fluid behind the left eardrum.  Our plan here is to decongest that fluid with Nasonex.  He incidentally is experiencing a little congestion over the last 2 to 3 weeks but without any fever.  I did not appreciate any cerumen in his ear exam today              Review of Systems  Constitutional, HEENT, cardiovascular, pulmonary, GI, , musculoskeletal, neuro, skin, endocrine and psych systems are negative, except as otherwise noted.      Objective    /74   Pulse 82   Temp 98.6  F (37  C)   Resp 18   Ht 1.778 m (5' 10\")   Wt 88.5 kg (195 lb)   SpO2 98%   BMI 27.98 kg/m    Body mass index is 27.98 kg/m .  Physical Exam   HEENT examination-patient has some fluid behind his left ear the drum itself looks normal there is no injection external canal appears mildly irritated treatment will be corticosteroid nasal inhalations            Signed Electronically by: Misael Costa MD    "

## 2024-07-25 NOTE — TELEPHONE ENCOUNTER
PA Initiation    Medication:     mometasone (NASONEX) 50 MCG/ACT nasal spray     Insurance Company:  [employer]   Pharmacy Filling the Rx:  [facility]  Filling Pharmacy Phone:  [phone]  Filling Pharmacy Fax:  [phone]  Start Date:   07/25/2024

## 2024-07-29 NOTE — TELEPHONE ENCOUNTER
Central Prior Authorization Team   Phone: [phone]    PA Initiation    Medication: mometasone (NASONEX) 50 MCG/ACT nasal spray  Insurance Company: [employer] - Phone [phone] Fax [phone]  Pharmacy Filling the Rx: [id number] [place] [address] AT [place] [address]  Filling Pharmacy Phone: [phone]  Filling Pharmacy Fax:    Start Date: 7/29/2024

## 2024-07-30 NOTE — TELEPHONE ENCOUNTER
Prior Authorization Approval    Authorization Effective Date: 6/29/2024  Authorization Expiration Date: 7/29/2025  Medication: mometasone (NASONEX) 50 MCG/ACT nasal spray  Reference #:     Insurance Company: [employer] - Phone [phone] Fax [phone]  Which Pharmacy is filling the prescription (Not needed for infusion/clinic administered): [id number] [place] [address] AT [place] [address]  Pharmacy Notified: Yes  Patient Notified: Instructed pharmacy to notify patient when script is ready to /ship.

## 2024-09-07 ASSESSMENT — ASTHMA QUESTIONNAIRES
ACT_TOTALSCORE: 25
QUESTION_3 LAST FOUR WEEKS HOW OFTEN DID YOUR ASTHMA SYMPTOMS (WHEEZING, COUGHING, SHORTNESS OF BREATH, CHEST TIGHTNESS OR PAIN) WAKE YOU UP AT NIGHT OR EARLIER THAN USUAL IN THE MORNING: NOT AT ALL
ACT_TOTALSCORE: 25
QUESTION_5 LAST FOUR WEEKS HOW WOULD YOU RATE YOUR ASTHMA CONTROL: COMPLETELY CONTROLLED
QUESTION_4 LAST FOUR WEEKS HOW OFTEN HAVE YOU USED YOUR RESCUE INHALER OR NEBULIZER MEDICATION (SUCH AS ALBUTEROL): NOT AT ALL
QUESTION_2 LAST FOUR WEEKS HOW OFTEN HAVE YOU HAD SHORTNESS OF BREATH: NOT AT ALL
QUESTION_1 LAST FOUR WEEKS HOW MUCH OF THE TIME DID YOUR ASTHMA KEEP YOU FROM GETTING AS MUCH DONE AT WORK, SCHOOL OR AT HOME: NONE OF THE TIME

## 2024-09-10 ENCOUNTER — OFFICE VISIT (OUTPATIENT)
Dept: INTERNAL MEDICINE | Facility: CLINIC | Age: 74
End: 2024-09-10
Payer: COMMERCIAL

## 2024-09-10 VITALS
BODY MASS INDEX: 27.63 KG/M2 | SYSTOLIC BLOOD PRESSURE: 138 MMHG | OXYGEN SATURATION: 97 % | RESPIRATION RATE: 16 BRPM | TEMPERATURE: 97.8 F | WEIGHT: 193 LBS | HEIGHT: 70 IN | DIASTOLIC BLOOD PRESSURE: 84 MMHG | HEART RATE: 82 BPM

## 2024-09-10 DIAGNOSIS — Z01.818 PREOP GENERAL PHYSICAL EXAM: Primary | ICD-10-CM

## 2024-09-10 DIAGNOSIS — I10 ESSENTIAL HYPERTENSION, BENIGN: ICD-10-CM

## 2024-09-10 DIAGNOSIS — M54.50 CHRONIC LEFT-SIDED LOW BACK PAIN WITHOUT SCIATICA: ICD-10-CM

## 2024-09-10 DIAGNOSIS — G89.29 CHRONIC LEFT-SIDED LOW BACK PAIN WITHOUT SCIATICA: ICD-10-CM

## 2024-09-10 LAB
ANION GAP SERPL CALCULATED.3IONS-SCNC: 10 MMOL/L (ref 7–15)
ATRIAL RATE - MUSE: 78 BPM
BUN SERPL-MCNC: 12.6 MG/DL (ref 8–23)
CALCIUM SERPL-MCNC: 9.4 MG/DL (ref 8.8–10.4)
CHLORIDE SERPL-SCNC: 106 MMOL/L (ref 98–107)
CREAT SERPL-MCNC: 1.02 MG/DL (ref 0.67–1.17)
DIASTOLIC BLOOD PRESSURE - MUSE: NORMAL MMHG
EGFRCR SERPLBLD CKD-EPI 2021: 77 ML/MIN/1.73M2
GLUCOSE SERPL-MCNC: 111 MG/DL (ref 70–99)
HCO3 SERPL-SCNC: 25 MMOL/L (ref 22–29)
HGB BLD-MCNC: 17 G/DL (ref 13.3–17.7)
INTERPRETATION ECG - MUSE: NORMAL
P AXIS - MUSE: -7 DEGREES
POTASSIUM SERPL-SCNC: 4.5 MMOL/L (ref 3.4–5.3)
PR INTERVAL - MUSE: 174 MS
QRS DURATION - MUSE: 80 MS
QT - MUSE: 404 MS
QTC - MUSE: 460 MS
R AXIS - MUSE: 11 DEGREES
SODIUM SERPL-SCNC: 141 MMOL/L (ref 135–145)
SYSTOLIC BLOOD PRESSURE - MUSE: NORMAL MMHG
T AXIS - MUSE: 20 DEGREES
VENTRICULAR RATE- MUSE: 78 BPM

## 2024-09-10 PROCEDURE — 93005 ELECTROCARDIOGRAM TRACING: CPT | Performed by: NURSE PRACTITIONER

## 2024-09-10 PROCEDURE — 85018 HEMOGLOBIN: CPT | Performed by: NURSE PRACTITIONER

## 2024-09-10 PROCEDURE — 36415 COLL VENOUS BLD VENIPUNCTURE: CPT | Performed by: NURSE PRACTITIONER

## 2024-09-10 PROCEDURE — 80048 BASIC METABOLIC PNL TOTAL CA: CPT | Performed by: NURSE PRACTITIONER

## 2024-09-10 PROCEDURE — 99214 OFFICE O/P EST MOD 30 MIN: CPT | Performed by: NURSE PRACTITIONER

## 2024-09-10 PROCEDURE — 93010 ELECTROCARDIOGRAM REPORT: CPT | Performed by: INTERNAL MEDICINE

## 2024-09-10 NOTE — PROGRESS NOTES
Preoperative Evaluation  [facility]  [address] [address]  Phone: [phone]  Fax: [phone]  Primary Provider: Misael Costa MD  Pre-op Performing Provider: Zeinab Souza NP  Sep 10, 2024             9/7/2024   Surgical Information   What procedure is being done? Lower back surgery   Facility or Hospital where procedure/surgery will be performed: [facility]   Who is doing the procedure / surgery? Dr. Deven Moreno   Date of surgery / procedure: Sept 12th 2024   Time of surgery / procedure: Not sure yet   Where do you plan to recover after surgery? at home with family        Fax number for surgical facility: [facility]    Assessment & Plan     The proposed surgical procedure is considered INTERMEDIATE risk.    Preop general physical exam  No contraindications for planned procedure of microdiscectomy of the lumbar spine.  Preoperative guidelines were reviewed.  - Basic metabolic panel; Future  - Hemoglobin; Future  - EKG 12-lead, tracing only  - Basic metabolic panel  - Hemoglobin    Chronic left-sided low back pain without sciatica  Currently managed by [facility] orthopedics.  patient reports that he has history of left-sided low back pain.  States that they are going to remove part of the disc in his low back due to chronic pain.  States has done physical therapy for this without any significant improvement.  Has a history of having a microdiscectomy of the same area in the past.  Currently uses gabapentin as needed for pain and I recommend he hold this medication the morning of surgery.    Benign Essential Hypertension  Managed with lifestyle modifications at this time.  Blood pressure controlled at 138/84 today.  - Basic metabolic panel; Future  - Basic metabolic panel       - No identified additional risk factors other than previously addressed    Preoperative Medication Instructions  Antiplatelet or Anticoagulation Medication  Instructions   - Patient is on no antiplatelet or anticoagulation medications.    Additional Medication Instructions  Take all scheduled medications on the day of surgery EXCEPT for modifications listed below:   - Herbal medications and vitamins: DO NOT TAKE 14 days prior to surgery.    Recommendation  Approval given to proceed with proposed procedure, without further diagnostic evaluation.    Subjective   Duane is a 74 year old, presenting for the following:  Pre-Op Exam (Back surgery on 9/12 at [facility] by Dr Nicholson)          9/10/2024    10:42 AM   Additional Questions   Roomed by Velma ESCOBAR related to upcoming procedure: Duane is a very pleasant 74-year-old male here for preoperative examination prior to having a microdiscectomy of his lumbar spine.  States he has a history of low back pain.  Had a microdiscectomy of his lumbar spine in the past.  About 1 year ago it started hurting again and he has been seeing [facility] orthopedics spine specialist for this.  States that the plan is now to repeat a microdiscectomy on his lumbar spine.  Unsure specifically which areas the microdiscectomy will be occurring up.  Unable to view specialist records.        9/7/2024   Pre-Op Questionnaire   Have you ever had a heart attack or stroke? No   Have you ever had surgery on your heart or blood vessels, such as a stent placement, a coronary artery bypass, or surgery on an artery in your head, neck, heart, or legs? No   Do you have chest pain with activity? No   Do you have a history of heart failure? No   Do you currently have a cold, bronchitis or symptoms of other infection? No   Do you have a cough, shortness of breath, or wheezing? No   Do you or anyone in your family have previous history of blood clots? No   Do you or does anyone in your family have a serious bleeding problem such as prolonged bleeding following surgeries or cuts? No   Have you ever had problems with anemia or been told to take iron pills?  No   Have you had any abnormal blood loss such as black, tarry or bloody stools? No   Have you ever had a blood transfusion? No   Are you willing to have a blood transfusion if it is medically needed before, during, or after your surgery? Yes   Have you or any of your relatives ever had problems with anesthesia? No   Do you have sleep apnea, excessive snoring or daytime drowsiness? No   Do you have any artifical heart valves or other implanted medical devices like a pacemaker, defibrillator, or continuous glucose monitor? No   Do you have artificial joints? (!) YES, right knee and left hip.    Are you allergic to latex? No        Health Care Directive  Patient does not have a Health Care Directive or Living Will: Patient states has Advance Directive and will bring in a copy to clinic.    Preoperative Review of    reviewed - controlled substances reflected in medication list.          Patient Active Problem List    Diagnosis Date Noted    Acute pain of right shoulder 07/28/2023     Priority: Medium    History of colonic polyps 11/14/2022     Priority: Medium    Benign neoplasm of ascending colon 11/14/2022     Priority: Medium    Asthma      Priority: Medium     Created by Conversion        Overweight      Priority: Medium     Created by Conversion        Cervical nerve root compression 04/27/2018     Priority: Medium    Benign neoplasm of cecum 11/02/2017     Priority: Medium    Polyp of colon 10/31/2017     Priority: Medium     Created by Conversion  Replacement Utility updated for latest IMO load        Hemorrhoids 10/31/2017     Priority: Medium    Hyperlipemia      Priority: Medium     Created by Conversion        Enthesopathy Of The Left Knee      Priority: Medium     Created by Conversion  Replacement Utility updated for latest IMO load        Diverticulosis      Priority: Medium     Created by Conversion  Replacement Utility updated for latest IMO load        Diverticula, colon 08/06/2015     Priority:  Medium    Esophageal reflux      Priority: Medium     Created by Conversion        Benign Essential Hypertension      Priority: Medium     Created by Conversion        Inflammatory arthritis 04/22/2015     Priority: Medium    Arthralgia 04/08/2015     Priority: Medium    Polyarthropathy 04/08/2015     Priority: Medium    Wrist arthritis 04/08/2015     Priority: Medium    Shoulder impingement 04/08/2015     Priority: Medium    Influenza      Priority: Medium     Created by Conversion          Past Medical History:   Diagnosis Date    Acid reflux      Past Surgical History:   Procedure Laterality Date    APPENDECTOMY      BACK SURGERY      CERVICAL LAMINECTOMY N/A 5/22/2018    Procedure: RIGHT CERVICAL 6-7, CERVICAL 7- THORACIC 1, THORACIC 1-2 HEMILAMINOTOMY MEDIAL FACETECTOMY ANF FORAMINOTOMY;  Surgeon: Brittany Victor MD;  Location: [facility] OR;  Service:     CERVICAL SPINE SURGERY  2000    COLON SURGERY  2015    sigmoid resection for diverticulitis    FRACTURE SURGERY      HC REMOVAL PREPATELLA BURSA Left     Description: Excision Of Prepatellar Bursa;  Recorded: 05/12/2014;    JOINT REPLACEMENT Right     right TKA    LUMBAR SPINE SURGERY      X2    OTHER SURGICAL HISTORY      fistula reemoval intestines    OTHER SURGICAL HISTORY      rectal fistula repairmultiple    PICC  8/13/2015         RECTAL PROLAPSE REPAIR N/A 8/6/2015    Procedure: SIGMOID RESECTION;  Surgeon: Juan Ramon Carrion MD;  Location: [facility];  Service:      Current Outpatient Medications   Medication Sig Dispense Refill    albuterol (PROAIR HFA/PROVENTIL HFA/VENTOLIN HFA) 108 (90 Base) MCG/ACT inhaler INHALE 2 PUFFS BY MOUTH EVERY 4 HOURS AS NEEDED FOR WHEEZING. 8.5 g 3    b complex vitamins tablet Take 1 tablet by mouth daily      budesonide-formoterol (SYMBICORT) 80-4.5 MCG/ACT Inhaler       fish oil-omega-3 fatty acids 500 MG capsule       gabapentin (NEURONTIN) 300 MG capsule Take 1 capsule (300 mg) by mouth daily 270 capsule 1     glucosamine-chondroitin 500-400 mg cap [GLUCOSAMINE-CHONDROITIN 500-400 MG CAP] Take 2 capsules by mouth daily.       mometasone (NASONEX) 50 MCG/ACT nasal spray Spray 2 sprays into both nostrils daily 17 g 3    omeprazole (PRILOSEC) 20 MG capsule Take 20 mg by mouth daily as needed      rivaroxaban ANTICOAGULANT (XARELTO) 15 MG TABS tablet Take 15 mg by mouth         Allergies   Allergen Reactions    Statins-Hmg-Coa Reductase Inhibitors [Statins] Muscle Pain (Myalgia)    Niacin Rash     Rash all over with high dose of niacin  Rash all over with high dose of niacin          Social History     Tobacco Use    Smoking status: Former     Current packs/day: 0.00     Types: Cigarettes     Quit date: 1994     Years since quittin.7     Passive exposure: Past    Smokeless tobacco: Never   Substance Use Topics    Alcohol use: Yes     Comment: Alcoholic Drinks/day: occasionally        History   Drug Use No         Constitutional: No fever or unexplained weight loss.  No severe fatigue.    HEENT: Negative for ear pain, changes in hearing, frequent nosebleeds, nasal congestion, runny nose, sore throat, loose teeth, dentures or partials.    Eyes: Denies any changes in vision or eye pain. Wears glasses.     Respiratory: Occasional cough for years. Negative for wheezing or shortness of breath.    Cardiovascular: No palpitations, tachycardia, chest pain or lower extremity edema.    Gastrointestinal: Negative for nausea, vomiting, abdominal pain, uncontrolled heartburn or changes in bowel movements.    Genitourinary: Negative for any urinary symptoms or changes in urinary habits.    Integumentary: Negative for any rash or suspicious lesions.    Musculoskeletal: Negative except as noted in HPI.     Neurological: Negative for severe dizziness, headaches or numbness.    Psychiatric: No severe anxiety.  No issues with sleep or significant depression.  Denies recreational drug use or regular use of  "alcohol.    Allergic/immunologic: Negative for any history of complications with anesthesia.  No history of seasonal allergies.  No known exposure to HIV or hepatitis C.      Objective    /84 (BP Location: Right arm, Patient Position: Sitting)   Pulse 82   Temp 97.8  F (36.6  C)   Resp 16   Ht 1.778 m (5' 10\")   Wt 87.5 kg (193 lb)   SpO2 97%   BMI 27.69 kg/m     Estimated body mass index is 27.69 kg/m  as calculated from the following:    Height as of this encounter: 1.778 m (5' 10\").    Weight as of this encounter: 87.5 kg (193 lb).  Physical Exam  Constitutional: In no acute distress.  Clean appearance.  Eyes: PERRLA.  EOMI.  No conjunctival redness.  Ears: Bilateral TMs are intact without any erythema or effusion.  Grossly normal hearing.  Nose: Nares patent bilaterally.  Normal mucosa.  Oropharynx: Normal mucosa.  Dentition and gingiva is appropriate.  Posterior oropharynx without any abnormalities.  Neck: Supple.  Trachea is midline.  No thyromegaly.  Neck is without tenderness or masses.  Cardiovascular: Regular rate and rhythm.  Normal peripheral perfusion.  No edema.  No varicosities.  Respiratory: Lungs are clear bilaterally.  Normal respiratory effort.  Skin: Skin is without significant rashes or lesions.  No suspicious moles.  Gastrointestinal: Soft and flat.  Normal bowel sounds.  Nontender throughout upon palpation.  No organomegaly or masses.  Negative for CVA tenderness.  Musculoskeletal: Extremities without any cyanosis or clubbing.  No joint swelling or deformities.  Normal range of motion of extremities.  Gait normal.  Able to mount exam table without difficulties.  Psychiatric: Appropriate affect and demeanor.  Memory intact.  Good insight and judgment.  Neurologic: Sensation and temperature of extremities appropriate.  No tremor or involuntary movement noted.      Recent Labs   Lab Test 04/16/24  1109 10/04/23  0945   HGB 17.3 16.7    163     --    POTASSIUM 4.3 4.8 "   CR 0.92  --         Diagnostics  Labs pending at this time.  Results will be reviewed when available.   EKG: appears normal, NSR, normal axis, normal intervals, no acute ST/T changes c/w ischemia, no LVH by voltage criteria, unchanged from previous tracings    Revised Cardiac Risk Index (RCRI)  The patient has the following serious cardiovascular risks for perioperative complications:   - No serious cardiac risks = 0 points     RCRI Interpretation: 0 points: Class I (very low risk - 0.4% complication rate)         Signed Electronically by: Zeinab Souza NP  A copy of this evaluation report is provided to the requesting physician.

## 2024-09-10 NOTE — PATIENT INSTRUCTIONS

## 2024-09-25 ENCOUNTER — TRANSFERRED RECORDS (OUTPATIENT)
Dept: HEALTH INFORMATION MANAGEMENT | Facility: CLINIC | Age: 74
End: 2024-09-25
Payer: COMMERCIAL

## 2024-11-01 ENCOUNTER — TRANSFERRED RECORDS (OUTPATIENT)
Dept: HEALTH INFORMATION MANAGEMENT | Facility: CLINIC | Age: 74
End: 2024-11-01
Payer: COMMERCIAL

## 2024-11-14 ENCOUNTER — OFFICE VISIT (OUTPATIENT)
Dept: PHYSICAL MEDICINE AND REHAB | Facility: CLINIC | Age: 74
End: 2024-11-14
Payer: COMMERCIAL

## 2024-11-14 VITALS
DIASTOLIC BLOOD PRESSURE: 72 MMHG | BODY MASS INDEX: 27.92 KG/M2 | HEIGHT: 70 IN | HEART RATE: 83 BPM | WEIGHT: 195 LBS | SYSTOLIC BLOOD PRESSURE: 128 MMHG

## 2024-11-14 DIAGNOSIS — M54.2 CERVICAL SPINE PAIN: Primary | ICD-10-CM

## 2024-11-14 DIAGNOSIS — R20.2 RIGHT HAND PARESTHESIA: ICD-10-CM

## 2024-11-14 DIAGNOSIS — R20.2 BILATERAL LEG PARESTHESIA: ICD-10-CM

## 2024-11-14 DIAGNOSIS — M79.18 MYOFASCIAL PAIN: ICD-10-CM

## 2024-11-14 DIAGNOSIS — M54.50 LUMBAR SPINE PAIN: ICD-10-CM

## 2024-11-14 RX ORDER — GABAPENTIN 100 MG/1
300 CAPSULE ORAL DAILY
Qty: 90 CAPSULE | Refills: 1 | Status: SHIPPED | OUTPATIENT
Start: 2024-11-14

## 2024-11-14 ASSESSMENT — PAIN SCALES - GENERAL: PAINLEVEL_OUTOF10: NO PAIN (1)

## 2024-11-14 NOTE — PATIENT INSTRUCTIONS
Gabapentin 300mg at bedtime daily.  Reduce slowly as tolerated to 100mg at bedtime.  Ice your lumbar spine as needed  Sign a release to get MRI images of lumbar spine from [facility].

## 2024-11-14 NOTE — PROGRESS NOTES
Assessment/Plan:      Duane was seen today for neck pain.    Diagnoses and all orders for this visit:    Cervical spine pain    Myofascial pain  -     gabapentin (NEURONTIN) 100 MG capsule; Take 3 capsules (300 mg) by mouth daily.    Right hand paresthesia  -     gabapentin (NEURONTIN) 100 MG capsule; Take 3 capsules (300 mg) by mouth daily.    Lumbar spine pain    Bilateral leg paresthesia         Assessment: Pleasant 74 year old male with a history of reflux, hyperlipidemia and diverticulitis with:     1.  Chronic left-sided cervical spine pain with pain along upper trapezius.  Waxes and wanes in intensity.  Has decreased range of motion with rotation to the left.  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  Responded quite well for 1-2 weeks after cervical transforaminal epidural in the past.    Currently doing well with gabapentin 300 mg daily at bedtime.    2.  Improved right hand cramping with improved numbness and tingling digits 4 and 5 which persist.  History of cervical spine pain with right arm pain paresthesias and weakness.  Cervical decompression C8-T1 was done in May 2018.  He is having some right hand cramping at times with fine motor skills likely related to healing from the surgery.  Continues feeling  well with gabapentin 300 mg daily.    3.  Left lumbar spine pain with prior left lumbar radicular pain status post microdiscectomy by Dr. Nicholson at [facility] 2 months ago.  Increased pain over the past couple of weeks after catching his wife will try to help her out of the bathtub.  Pain is consistent with lumbar strain no radicular pain at this time.      4.  Persistent paresthesias bilateral lower extremities in the toes.  This is consistent with a peripheral polyneuropathy.  Has had normal thyroid function B12 and glucose 2 years ago.  Recent BMP showed borderline glucose 108.    findings  consistent with a mild length-dependent sensorimotor polyneuropathy on EMG.        5.  L2-3 L3-4 central disc bulge with osteophyte at least mild central stenosis on CT abdomen and pelvis from 2015 question persistent.  Worsening stenosis which could contribute to foot paresthesias.      Discussion:    1.  We discussed the diagnosis and treatment options.  At this time we discussed the left lumbar spine pain he is 2 months out of lumbar discectomy and only having back pain resolves with cold/ice for a short time.  Reassurance provided as he is having no radicular pain but will monitor symptoms and should see his surgeon for follow-up.    I also discussed cervical spine and right arm paresthesias and foot paresthesias.  At this point symptoms are controlled with gabapentin.  With essentially negative neurologic exam recommend no further workup he agrees.    2.  He would like to try to decrease gabapentin.  Currently taking 300 mg capsules 1 daily.  Will provide 100 mg capsules he should take 300 mg daily slowly decrease as tolerated to 100 mg daily.    3.  Will have him sign a release to obtain MRI images of the lumbar spine which were recently done for my review to evaluate the L1-2 and L2-3 region.    4.  Follow-up with me as needed will reach out over [employer] once I reviewed the images.        It was our pleasure caring for your patient today, if there any questions or concerns please do not hesitate to contact us.      Subjective:   Patient ID: Nando Carreno is a 74 year old male.    History of Present Illness: Patient presents for follow-up of cervical spine pain with right arm pain paresthesias left sided cervical spine pain.  Overall his neck pain is doing quite well.  Only minimal symptoms in the neck taking gabapentin 300 mg daily which has been quite good for him no further neck pain.  Does feel some weakness through the arms in general and numbness and tingling right digit 4 and 5 which has not  significantly changed but no pain with the gabapentin 300 mg daily and no side effects.  Taking this at bedtime.    He also has low back pain left-sided low back.  Previous having left lower extremity radicular pain after pulling a sink off the wall while in [place] 2 months ago had microdiscectomy by Dr. Nicholson.  I do not have those notes.  Did well until 2 weeks ago.  His wife fell and broke her arm.  She was taking a bath and he was helping her get up when she slipped and caught her.  Left-sided back pain around the PSIS no radiation down the legs paresthesias or weakness.  Better with ice will resolve for several hours with icing his pain flare.    Continues to have numbness and tingling in both feet      Imaging: Plain films of the right shoulder personally reviewed from July 2023 showing mild glenohumeral joint osteoarthritis and moderate AC joint osteoarthritis.    CT abdomen and pelvis from 2015 to evaluate the lumbar spine worse reviewed.  This reveals mild osteoarthritis of the hips and coronal imaging.  Slight right scoliotic curve with right greater than left disc height loss L5-S1 with degenerative disc disease.  Degenerative disc height loss at L2-3.  Mild foraminal stenosis on the left severe right L5-S1 central disc osteophyte L1-2 resulting in appears to be at least moderate central stenosis as well as moderate central stenosis L2-3.    Cervical MRI from 2018 personally reviewed this reveals severe disc height loss C5-6 broad-based disc bulge C4-5 decompression C5-6.  No high-grade spinal cord compression.  There is varying amounts of foraminal stenosis most severe on the left at C4-5 which is severe, severe right C6-7 moderate left C5-6 severe left C6-7.    Review of Systems: Denies bowel or bladder incontinence headaches, falls, swallowing issues fevers or unintentional weight loss.         Current Outpatient Medications   Medication Sig Dispense Refill    albuterol (PROAIR HFA/PROVENTIL  "HFA/VENTOLIN HFA) 108 (90 Base) MCG/ACT inhaler INHALE 2 PUFFS BY MOUTH EVERY 4 HOURS AS NEEDED FOR WHEEZING. 8.5 g 3    b complex vitamins tablet Take 1 tablet by mouth daily      budesonide-formoterol (SYMBICORT) 80-4.5 MCG/ACT Inhaler       fish oil-omega-3 fatty acids 500 MG capsule       gabapentin (NEURONTIN) 300 MG capsule Take 1 capsule (300 mg) by mouth daily 270 capsule 1    glucosamine-chondroitin 500-400 mg cap [GLUCOSAMINE-CHONDROITIN 500-400 MG CAP] Take 2 capsules by mouth daily.       mometasone (NASONEX) 50 MCG/ACT nasal spray Spray 2 sprays into both nostrils daily 17 g 3     No current facility-administered medications for this visit.       Past Medical History:   Diagnosis Date    Acid reflux        The following portions of the patient's history were reviewed and updated as appropriate: allergies, current medications, past family history, past medical history, past social history, past surgical history and problem list.           Objective:   Physical Exam:    /72   Pulse 83   Ht 5' 10\" (1.778 m)   Wt 195 lb (88.5 kg)   BMI 27.98 kg/m    There is no height or weight on file to calculate BMI.      General: Alert and oriented with normal affect. Attention, knowledge, memory, and language are intact. No acute distress.   Eyes: Sclerae are clear.  Respirations: Unlabored. CV: No lower extremity edema.  Skin: No rashes seen.  Lumbar postsurgical scar clean dry intact.  Gait:  Nonantalgic  Mildly reduced range of motion cervical spine rotation bilaterally.  Sensation is intact to light touch throughout the upper and lower extremities.  Reflexes are 2+ and symmetric in the biceps triceps and brachioradialis with negative Hoffmans. 2+ patellar and 0 Achilles      Manual muscle testing reveals:  Right /Left out of 5     5/5 elbow flexors  5/5 elbow extensors  5/5 wrist extensors  5/5 interosseus  5/5 finger flexors     5/5 knee flexors  5/5 knee extensors  5/5 ankle plantar flexors  5/5 ankle " dorsiflexors  5/5  EHL

## 2024-11-14 NOTE — LETTER
11/14/2024      Nando Carreno  [address] [place] [id number]      Dear Colleague,    Thank you for referring your patient, Nando Carreno, to the [facility]. Please see a copy of my visit note below.    Assessment/Plan:      Duane was seen today for neck pain.    Diagnoses and all orders for this visit:    Cervical spine pain    Myofascial pain  -     gabapentin (NEURONTIN) 100 MG capsule; Take 3 capsules (300 mg) by mouth daily.    Right hand paresthesia  -     gabapentin (NEURONTIN) 100 MG capsule; Take 3 capsules (300 mg) by mouth daily.    Lumbar spine pain    Bilateral leg paresthesia         Assessment: Pleasant 74 year old male with a history of reflux, hyperlipidemia and diverticulitis with:     1.  Chronic left-sided cervical spine pain with pain along upper trapezius.  Waxes and wanes in intensity.  Has decreased range of motion with rotation to the left.  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  Responded quite well for 1-2 weeks after cervical transforaminal epidural in the past.    Currently doing well with gabapentin 300 mg daily at bedtime.    2.  Improved right hand cramping with improved numbness and tingling digits 4 and 5 which persist.  History of cervical spine pain with right arm pain paresthesias and weakness.  Cervical decompression C8-T1 was done in May 2018.  He is having some right hand cramping at times with fine motor skills likely related to healing from the surgery.  Continues feeling  well with gabapentin 300 mg daily.    3.  Left lumbar spine pain with prior left lumbar radicular pain status post microdiscectomy by Dr. Nicholson at [facility] 2 months ago.  Increased pain over the past couple of weeks after catching his wife will try to help her out of the bathtub.  Pain is consistent with lumbar strain no radicular pain at this  time.      4.  Persistent paresthesias bilateral lower extremities in the toes.  This is consistent with a peripheral polyneuropathy.  Has had normal thyroid function B12 and glucose 2 years ago.  Recent BMP showed borderline glucose 108.    findings consistent with a mild length-dependent sensorimotor polyneuropathy on EMG.        5.  L2-3 L3-4 central disc bulge with osteophyte at least mild central stenosis on CT abdomen and pelvis from 2015 question persistent.  Worsening stenosis which could contribute to foot paresthesias.      Discussion:    1.  We discussed the diagnosis and treatment options.  At this time we discussed the left lumbar spine pain he is 2 months out of lumbar discectomy and only having back pain resolves with cold/ice for a short time.  Reassurance provided as he is having no radicular pain but will monitor symptoms and should see his surgeon for follow-up.    I also discussed cervical spine and right arm paresthesias and foot paresthesias.  At this point symptoms are controlled with gabapentin.  With essentially negative neurologic exam recommend no further workup he agrees.    2.  He would like to try to decrease gabapentin.  Currently taking 300 mg capsules 1 daily.  Will provide 100 mg capsules he should take 300 mg daily slowly decrease as tolerated to 100 mg daily.    3.  Will have him sign a release to obtain MRI images of the lumbar spine which were recently done for my review to evaluate the L1-2 and L2-3 region.    4.  Follow-up with me as needed will reach out over [employer] once I reviewed the images.        It was our pleasure caring for your patient today, if there any questions or concerns please do not hesitate to contact us.      Subjective:   Patient ID: Nando Carreno is a 74 year old male.    History of Present Illness: Patient presents for follow-up of cervical spine pain with right arm pain paresthesias left sided cervical spine pain.  Overall his neck pain is doing  quite well.  Only minimal symptoms in the neck taking gabapentin 300 mg daily which has been quite good for him no further neck pain.  Does feel some weakness through the arms in general and numbness and tingling right digit 4 and 5 which has not significantly changed but no pain with the gabapentin 300 mg daily and no side effects.  Taking this at bedtime.    He also has low back pain left-sided low back.  Previous having left lower extremity radicular pain after pulling a sink off the wall while in [place] 2 months ago had microdiscectomy by Dr. Nicholson.  I do not have those notes.  Did well until 2 weeks ago.  His wife fell and broke her arm.  She was taking a bath and he was helping her get up when she slipped and caught her.  Left-sided back pain around the PSIS no radiation down the legs paresthesias or weakness.  Better with ice will resolve for several hours with icing his pain flare.    Continues to have numbness and tingling in both feet      Imaging: Plain films of the right shoulder personally reviewed from July 2023 showing mild glenohumeral joint osteoarthritis and moderate AC joint osteoarthritis.    CT abdomen and pelvis from 2015 to evaluate the lumbar spine worse reviewed.  This reveals mild osteoarthritis of the hips and coronal imaging.  Slight right scoliotic curve with right greater than left disc height loss L5-S1 with degenerative disc disease.  Degenerative disc height loss at L2-3.  Mild foraminal stenosis on the left severe right L5-S1 central disc osteophyte L1-2 resulting in appears to be at least moderate central stenosis as well as moderate central stenosis L2-3.    Cervical MRI from 2018 personally reviewed this reveals severe disc height loss C5-6 broad-based disc bulge C4-5 decompression C5-6.  No high-grade spinal cord compression.  There is varying amounts of foraminal stenosis most severe on the left at C4-5 which is severe, severe right C6-7 moderate left C5-6 severe left  "C6-7.    Review of Systems: Denies bowel or bladder incontinence headaches, falls, swallowing issues fevers or unintentional weight loss.         Current Outpatient Medications   Medication Sig Dispense Refill     albuterol (PROAIR HFA/PROVENTIL HFA/VENTOLIN HFA) 108 (90 Base) MCG/ACT inhaler INHALE 2 PUFFS BY MOUTH EVERY 4 HOURS AS NEEDED FOR WHEEZING. 8.5 g 3     b complex vitamins tablet Take 1 tablet by mouth daily       budesonide-formoterol (SYMBICORT) 80-4.5 MCG/ACT Inhaler        fish oil-omega-3 fatty acids 500 MG capsule        gabapentin (NEURONTIN) 300 MG capsule Take 1 capsule (300 mg) by mouth daily 270 capsule 1     glucosamine-chondroitin 500-400 mg cap [GLUCOSAMINE-CHONDROITIN 500-400 MG CAP] Take 2 capsules by mouth daily.        mometasone (NASONEX) 50 MCG/ACT nasal spray Spray 2 sprays into both nostrils daily 17 g 3     No current facility-administered medications for this visit.       Past Medical History:   Diagnosis Date     Acid reflux        The following portions of the patient's history were reviewed and updated as appropriate: allergies, current medications, past family history, past medical history, past social history, past surgical history and problem list.           Objective:   Physical Exam:    /72   Pulse 83   Ht 5' 10\" (1.778 m)   Wt 195 lb (88.5 kg)   BMI 27.98 kg/m    There is no height or weight on file to calculate BMI.      General: Alert and oriented with normal affect. Attention, knowledge, memory, and language are intact. No acute distress.   Eyes: Sclerae are clear.  Respirations: Unlabored. CV: No lower extremity edema.  Skin: No rashes seen.  Lumbar postsurgical scar clean dry intact.  Gait:  Nonantalgic  Mildly reduced range of motion cervical spine rotation bilaterally.  Sensation is intact to light touch throughout the upper and lower extremities.  Reflexes are 2+ and symmetric in the biceps triceps and brachioradialis with negative Hoffmans. 2+ patellar " and 0 Achilles      Manual muscle testing reveals:  Right /Left out of 5     5/5 elbow flexors  5/5 elbow extensors  5/5 wrist extensors  5/5 interosseus  5/5 finger flexors     5/5 knee flexors  5/5 knee extensors  5/5 ankle plantar flexors  5/5 ankle dorsiflexors  5/5  EHL        Again, thank you for allowing me to participate in the care of your patient.        Sincerely,        Luis A Teixeira, DO

## 2024-12-17 ENCOUNTER — TRANSFERRED RECORDS (OUTPATIENT)
Dept: HEALTH INFORMATION MANAGEMENT | Facility: CLINIC | Age: 74
End: 2024-12-17
Payer: COMMERCIAL

## 2025-01-27 DIAGNOSIS — M54.2 CERVICAL SPINE PAIN: ICD-10-CM

## 2025-01-27 DIAGNOSIS — M79.18 MYOFASCIAL PAIN: ICD-10-CM

## 2025-01-28 RX ORDER — GABAPENTIN 300 MG/1
300 CAPSULE ORAL DAILY
Qty: 270 CAPSULE | Refills: 1 | Status: SHIPPED | OUTPATIENT
Start: 2025-01-28

## 2025-03-17 ENCOUNTER — PATIENT OUTREACH (OUTPATIENT)
Dept: CARE COORDINATION | Facility: CLINIC | Age: 75
End: 2025-03-17
Payer: COMMERCIAL

## 2025-03-31 ENCOUNTER — PATIENT OUTREACH (OUTPATIENT)
Dept: CARE COORDINATION | Facility: CLINIC | Age: 75
End: 2025-03-31
Payer: COMMERCIAL

## 2025-04-17 ENCOUNTER — PATIENT OUTREACH (OUTPATIENT)
Dept: GASTROENTEROLOGY | Facility: CLINIC | Age: 75
End: 2025-04-17
Payer: COMMERCIAL

## 2025-05-17 ENCOUNTER — HEALTH MAINTENANCE LETTER (OUTPATIENT)
Age: 75
End: 2025-05-17

## 2025-06-26 ENCOUNTER — TRANSFERRED RECORDS (OUTPATIENT)
Dept: HEALTH INFORMATION MANAGEMENT | Facility: CLINIC | Age: 75
End: 2025-06-26
Payer: COMMERCIAL